# Patient Record
Sex: FEMALE | Race: WHITE | NOT HISPANIC OR LATINO | Employment: UNEMPLOYED | URBAN - METROPOLITAN AREA
[De-identification: names, ages, dates, MRNs, and addresses within clinical notes are randomized per-mention and may not be internally consistent; named-entity substitution may affect disease eponyms.]

---

## 2017-05-18 ENCOUNTER — TELEPHONE (OUTPATIENT)
Dept: SURGERY | Facility: CLINIC | Age: 52
End: 2017-05-18

## 2017-05-18 NOTE — TELEPHONE ENCOUNTER
Patient states that she has a mole on her back that she would like to be removed.  It is itchy and bothersome.  appointment booked in [place] with Dr Villagomez on Monday, June 12 at 145 PM

## 2017-05-18 NOTE — TELEPHONE ENCOUNTER
----- Message from Nancy Brown sent at 5/18/2017  2:07 PM CDT -----  Patient states that she need to ask you a question before scheduling a new patient appointment.  Call [phone].

## 2017-06-28 ENCOUNTER — HOSPITAL ENCOUNTER (OUTPATIENT)
Dept: RADIOLOGY | Facility: HOSPITAL | Age: 52
Discharge: HOME | End: 2017-06-28
Attending: INTERNAL MEDICINE
Payer: OTHER GOVERNMENT

## 2017-06-28 ENCOUNTER — INITIAL CONSULT (OUTPATIENT)
Dept: RHEUMATOLOGY | Facility: CLINIC | Age: 52
End: 2017-06-28
Payer: OTHER GOVERNMENT

## 2017-06-28 VITALS
BODY MASS INDEX: 25.73 KG/M2 | WEIGHT: 169.75 LBS | SYSTOLIC BLOOD PRESSURE: 124 MMHG | HEIGHT: 68 IN | HEART RATE: 60 BPM | DIASTOLIC BLOOD PRESSURE: 80 MMHG

## 2017-06-28 DIAGNOSIS — M25.50 POLYARTHRALGIA: ICD-10-CM

## 2017-06-28 DIAGNOSIS — M19.041 PRIMARY OSTEOARTHRITIS OF BOTH HANDS: ICD-10-CM

## 2017-06-28 DIAGNOSIS — M79.7 FIBROMYALGIA: ICD-10-CM

## 2017-06-28 DIAGNOSIS — M19.042 PRIMARY OSTEOARTHRITIS OF BOTH HANDS: ICD-10-CM

## 2017-06-28 DIAGNOSIS — M25.50 POLYARTHRALGIA: Primary | ICD-10-CM

## 2017-06-28 DIAGNOSIS — R53.83 MALAISE AND FATIGUE: ICD-10-CM

## 2017-06-28 DIAGNOSIS — R53.81 MALAISE AND FATIGUE: ICD-10-CM

## 2017-06-28 PROCEDURE — 73130 X-RAY EXAM OF HAND: CPT | Mod: 50,TC,PO

## 2017-06-28 PROCEDURE — 99213 OFFICE O/P EST LOW 20 MIN: CPT | Mod: PBBFAC,25,PO | Performed by: INTERNAL MEDICINE

## 2017-06-28 PROCEDURE — 73130 X-RAY EXAM OF HAND: CPT | Mod: 26,50,, | Performed by: RADIOLOGY

## 2017-06-28 PROCEDURE — 99205 OFFICE O/P NEW HI 60 MIN: CPT | Mod: S$PBB,,, | Performed by: INTERNAL MEDICINE

## 2017-06-28 PROCEDURE — 99999 PR PBB SHADOW E&M-EST. PATIENT-LVL III: CPT | Mod: PBBFAC,,, | Performed by: INTERNAL MEDICINE

## 2017-06-28 RX ORDER — GABAPENTIN 300 MG/1
300 CAPSULE ORAL NIGHTLY
Qty: 30 CAPSULE | Refills: 3 | Status: SHIPPED | OUTPATIENT
Start: 2017-06-28 | End: 2019-05-21

## 2017-06-28 RX ORDER — NAPROXEN SODIUM 220 MG
220 TABLET ORAL
COMMUNITY

## 2017-06-28 ASSESSMENT — ROUTINE ASSESSMENT OF PATIENT INDEX DATA (RAPID3)
PATIENT GLOBAL ASSESSMENT SCORE: 4
PAIN SCORE: 7.5
PSYCHOLOGICAL DISTRESS SCORE: 1.1
MDHAQ FUNCTION SCORE: 1
TOTAL RAPID3 SCORE: 4.94

## 2017-06-28 NOTE — PROGRESS NOTES
Subjective:          Chief Complaint: Sonido Loera is a 51 y.o. female who had concerns including Disease Management.    HPI:    Patient is a 52 y/o female with aches and pains for approximately 3 years in joints and long bones. INtermittent. 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 Present for 22 years. Did have cholecystectomy years ago but did not resolve the issue. Twice/year she has severe pain.   Exacerbated with loss of sleep over a period of time.     REVIEW OF SYSTEMS:    Review of Systems   Constitutional: Positive for malaise/fatigue. Negative for fever and weight loss.   HENT: Negative for sore throat.    Eyes: Negative for double vision, photophobia and redness.   Respiratory: Negative for cough, shortness of breath and wheezing.    Cardiovascular: Negative for chest pain, palpitations and orthopnea.   Gastrointestinal: Negative for abdominal pain, constipation and diarrhea.   Genitourinary: Negative for dysuria, hematuria and urgency.   Musculoskeletal: Positive for joint pain and myalgias. Negative for back pain.   Skin: Negative for rash.   Neurological: Negative for dizziness, tingling, focal weakness and headaches.   Endo/Heme/Allergies: Does not bruise/bleed easily.   Psychiatric/Behavioral: Negative for depression, hallucinations and suicidal ideas. The patient has insomnia.                Objective:            Past Medical History:   Diagnosis Date    Anemia     Cancer     Encounter for blood transfusion     Joint pain      Family History   Problem Relation Age of Onset    Rhinitis Daughter     Urticaria Neg Hx     Immunodeficiency Neg Hx     Eczema Neg Hx     Asthma Neg Hx     Angioedema Neg Hx      Social History   Substance Use Topics    Smoking status: Former Smoker     Quit date: 1/16/1983    Smokeless tobacco: Never Used    Alcohol use Not on file         Current Outpatient Prescriptions on File Prior to Visit   Medication  Sig Dispense Refill    ascorbic acid (VITAMIN C) 500 MG tablet Take 500 mg by mouth once daily.      cetirizine (ZYRTEC) 10 MG tablet Take 10 mg by mouth once daily.      loratadine (CLARITIN) 10 mg tablet Take 10 mg by mouth once daily.      multivitamin (ONE DAILY MULTIVITAMIN) per tablet Take 1 tablet by mouth once daily.      [DISCONTINUED] hydrocodone-acetaminophen 5-325mg (NORCO) 5-325 mg per tablet   0     No current facility-administered medications on file prior to visit.        Vitals:    06/28/17 0859   BP: 124/80   Pulse: 60       Physical Exam:    Physical Exam   Constitutional: She is oriented to person, place, and time. She appears well-developed and well-nourished.   HENT:   Head: Normocephalic and atraumatic.   Mouth/Throat: Oropharynx is clear and moist.   Eyes: EOM are normal. Pupils are equal, round, and reactive to light.   Neck: Normal range of motion.   Cardiovascular: Normal rate, regular rhythm and normal heart sounds.    Pulmonary/Chest: Effort normal and breath sounds normal.   Musculoskeletal:        Right shoulder: She exhibits normal range of motion, no tenderness and no swelling.        Left shoulder: She exhibits normal range of motion, no tenderness and no swelling.        Right elbow: She exhibits normal range of motion and no swelling. No tenderness found.        Left elbow: She exhibits normal range of motion and no swelling. No tenderness found.        Right wrist: She exhibits normal range of motion, no tenderness and no swelling.        Left wrist: She exhibits normal range of motion, no tenderness and no swelling.        Right knee: She exhibits normal range of motion and no swelling. No tenderness found.        Left knee: She exhibits normal range of motion and no swelling. No tenderness found.        Right hand: She exhibits normal range of motion, no tenderness and no swelling.        Left hand: She exhibits normal range of motion, no tenderness and no swelling.         Right foot: There is normal range of motion, no tenderness and no swelling.        Left foot: There is normal range of motion, no tenderness and no swelling.   18 tender points examined in nine pairs: Posterior occiput, bilateral trapezius, bilateral supraspinatus, bilateral gluteal muscles, bilateral low cervical neck, bilateral second rib, bilateral lateral epicondyles, bilateral greater trochanters, bilateral medial knees. 6 Of 18 points examined were positive for tenderness.     Neurological: She is alert and oriented to person, place, and time.   Skin: Skin is warm and dry.   Psychiatric: She has a normal mood and affect. Her behavior is normal.             Assessment:       Encounter Diagnoses   Name Primary?    Polyarthralgia Yes    Fibromyalgia     Malaise and fatigue     Primary osteoarthritis of both hands           Plan:        Polyarthralgia  -     Sedimentation rate, manual; Future; Expected date: 06/28/2017  -     C-reactive protein; Standing; Expected date: 06/28/2017  -     SULEIMAN; Future; Expected date: 06/28/2017  -     Rheumatoid factor; Future; Expected date: 06/28/2017  -     TSH; Future; Expected date: 06/28/2017  -     T4, FREE; Future; Expected date: 06/28/2017  -     X-Ray Hand 3 View Bilateral; Future; Expected date: 06/28/2017    Fibromyalgia    Malaise and fatigue  -     Sedimentation rate, manual; Future; Expected date: 06/28/2017  -     C-reactive protein; Standing; Expected date: 06/28/2017  -     SULEIMAN; Future; Expected date: 06/28/2017  -     Rheumatoid factor; Future; Expected date: 06/28/2017  -     TSH; Future; Expected date: 06/28/2017  -     T4, FREE; Future; Expected date: 06/28/2017    Primary osteoarthritis of both hands    Other orders  -     gabapentin (NEURONTIN) 300 MG capsule; Take 1 capsule (300 mg total) by mouth every evening.  Dispense: 30 capsule; Refill: 3      Very pleasant 50 y/o female with myalgia and polyarthralgia triggered with sleep deprivation. Does not  have the typic tender points of FMS but highly suspect myofascial pain   -r/o thyroid   -Trial with gabapentin 300mg QHS    OA hands-NSAIDs PRN>   Spent time discussing FMS with patient and multidisciplinary approach to therapy with pharmacologic, psychologic, lifestyle modification and in particular low impact short interval exercise such as walking, latonia chi, yoga and swimming. Discussed the importance of sleep. Discussed exercise modalities.     Return in about 3 months (around 9/28/2017).        60min consultation with greater than 50% spent in counseling, chart review and coordination of care. All questions answered.  Thank you for allowing me to participate in the care of this very pleasant patient.

## 2017-06-29 ENCOUNTER — TELEPHONE (OUTPATIENT)
Dept: RHEUMATOLOGY | Facility: CLINIC | Age: 52
End: 2017-06-29

## 2017-06-29 NOTE — TELEPHONE ENCOUNTER
----- Message from Herrera Perez sent at 6/29/2017  2:42 PM CDT -----  Contact: Sonido  Patient states her Rx gabapentin (NEURONTIN) 300 MG capsule 30 capsule was sent to her old pharmacy and her insurance does not cover.     [facility] on [address], [place]   call her on [phone] (home)   Thanks!

## 2017-07-03 ENCOUNTER — TELEPHONE (OUTPATIENT)
Dept: RHEUMATOLOGY | Facility: CLINIC | Age: 52
End: 2017-07-03

## 2017-07-03 NOTE — TELEPHONE ENCOUNTER
Returned patient's call. Patient states that she sees her thyroid labs are within normal range but notices that its on the lower side. Patient wants to ask Dr. Kuamr is she can prescribe medication to increase thyroid levels. Informed patient Dr. Kumar will prob need to refer to endocrinology. Patient wants advisement from Dr. Kumar.

## 2017-08-28 ENCOUNTER — OFFICE VISIT (OUTPATIENT)
Dept: URGENT CARE | Facility: CLINIC | Age: 52
End: 2017-08-28
Payer: OTHER GOVERNMENT

## 2017-08-28 VITALS
SYSTOLIC BLOOD PRESSURE: 129 MMHG | HEART RATE: 89 BPM | DIASTOLIC BLOOD PRESSURE: 76 MMHG | RESPIRATION RATE: 16 BRPM | TEMPERATURE: 100 F | OXYGEN SATURATION: 96 %

## 2017-08-28 DIAGNOSIS — J18.9 PNEUMONIA DUE TO INFECTIOUS ORGANISM, UNSPECIFIED LATERALITY, UNSPECIFIED PART OF LUNG: ICD-10-CM

## 2017-08-28 DIAGNOSIS — R05.9 COUGH: Primary | ICD-10-CM

## 2017-08-28 PROCEDURE — 96372 THER/PROPH/DIAG INJ SC/IM: CPT | Mod: S$GLB,,, | Performed by: EMERGENCY MEDICINE

## 2017-08-28 PROCEDURE — 3008F BODY MASS INDEX DOCD: CPT | Mod: S$GLB,,, | Performed by: EMERGENCY MEDICINE

## 2017-08-28 PROCEDURE — 99203 OFFICE O/P NEW LOW 30 MIN: CPT | Mod: 25,S$GLB,, | Performed by: EMERGENCY MEDICINE

## 2017-08-28 RX ORDER — CEFTRIAXONE 250 MG/1
1000 INJECTION, POWDER, FOR SOLUTION INTRAMUSCULAR; INTRAVENOUS
Status: COMPLETED | OUTPATIENT
Start: 2017-08-28 | End: 2017-08-28

## 2017-08-28 RX ORDER — DOXYCYCLINE 100 MG/1
100 CAPSULE ORAL 2 TIMES DAILY
Qty: 14 CAPSULE | Refills: 0 | Status: SHIPPED | OUTPATIENT
Start: 2017-08-28 | End: 2017-09-04

## 2017-08-28 RX ORDER — BENZONATATE 200 MG/1
200 CAPSULE ORAL 3 TIMES DAILY PRN
Qty: 21 CAPSULE | Refills: 0 | Status: SHIPPED | OUTPATIENT
Start: 2017-08-28 | End: 2017-09-04

## 2017-08-28 RX ADMIN — CEFTRIAXONE 1000 MG: 250 INJECTION, POWDER, FOR SOLUTION INTRAMUSCULAR; INTRAVENOUS at 01:08

## 2017-08-28 NOTE — PROGRESS NOTES
Subjective:       Patient ID: Sonido Loera is a 51 y.o. female.    Vitals:  oral temperature is 100.2 °F (37.9 °C). Her blood pressure is 129/76 and her pulse is 89. Her respiration is 16 and oxygen saturation is 96%.     Chief Complaint: Cough (PT C/O PRODICTIVE COUGH, CHEST CONGESTION, HEADACHES, FATIGUE, FEVER AT HOME 101.9, TAKING IBUPROFEN WITH MILD RELIEF)    Normal body temp 97 by her hx.      Cough   This is a new problem. The current episode started 1 to 4 weeks ago. The problem has been unchanged. The problem occurs constantly. The cough is productive of sputum. Associated symptoms include chills, a fever and headaches. Pertinent negatives include no chest pain, ear pain, eye redness, myalgias, sore throat, shortness of breath or wheezing. Treatments tried: IBUPROFEN. The treatment provided mild relief.     Review of Systems   Constitution: Positive for chills, fever and malaise/fatigue.   HENT: Positive for congestion and headaches. Negative for ear pain, hoarse voice and sore throat.    Eyes: Negative for discharge and redness.   Cardiovascular: Negative for chest pain, dyspnea on exertion and leg swelling.   Respiratory: Positive for cough and sputum production. Negative for shortness of breath and wheezing.    Musculoskeletal: Negative for myalgias.   Gastrointestinal: Negative for abdominal pain and nausea.       Objective:      Physical Exam   Constitutional: She is oriented to person, place, and time. She appears well-developed and well-nourished. She is cooperative.  Non-toxic appearance. She does not appear ill. No distress.   HENT:   Head: Normocephalic and atraumatic.   Right Ear: Hearing, tympanic membrane, external ear and ear canal normal.   Left Ear: Hearing, tympanic membrane, external ear and ear canal normal.   Nose: Nose normal. No mucosal edema, rhinorrhea or nasal deformity. No epistaxis. Right sinus exhibits no maxillary sinus tenderness and no frontal sinus tenderness. Left sinus  exhibits no maxillary sinus tenderness and no frontal sinus tenderness.   Mouth/Throat: Uvula is midline, oropharynx is clear and moist and mucous membranes are normal. No trismus in the jaw. Normal dentition. No uvula swelling. No posterior oropharyngeal erythema.   Eyes: Conjunctivae and lids are normal. No scleral icterus.   Sclera clear bilat   Neck: Trachea normal, full passive range of motion without pain and phonation normal. Neck supple.   Cardiovascular: Regular rhythm, normal heart sounds, intact distal pulses and normal pulses.  Tachycardia present.    Pulmonary/Chest: Effort normal. No respiratory distress. She has rales in the right lower field.   Fine rales RLL   Abdominal: Normal appearance. She exhibits no distension. There is no tenderness.   Musculoskeletal: Normal range of motion. She exhibits no edema or deformity.   Neurological: She is alert and oriented to person, place, and time. She exhibits normal muscle tone. Coordination normal.   Skin: Skin is warm, dry and intact. She is not diaphoretic. No pallor.   Psychiatric: She has a normal mood and affect. Her speech is normal and behavior is normal. Judgment and thought content normal. Cognition and memory are normal.   Nursing note and vitals reviewed.      Assessment:       1. Cough    2. Pneumonia due to infectious organism, unspecified laterality, unspecified part of lung        Plan:         Cough  -     X-Ray Chest PA And Lateral; Future; Expected date: 08/28/2017    Pneumonia due to infectious organism, unspecified laterality, unspecified part of lung  -     doxycycline (VIBRAMYCIN) 100 MG Cap; Take 1 capsule (100 mg total) by mouth 2 (two) times daily.  Dispense: 14 capsule; Refill: 0  -     benzonatate (TESSALON) 200 MG capsule; Take 1 capsule (200 mg total) by mouth 3 (three) times daily as needed for Cough.  Dispense: 21 capsule; Refill: 0    Other orders  -     cefTRIAXone injection 1,000 mg; Inject 1,000 mg into the muscle one  time.

## 2017-08-31 ENCOUNTER — TELEPHONE (OUTPATIENT)
Dept: URGENT CARE | Facility: CLINIC | Age: 52
End: 2017-08-31

## 2018-11-28 ENCOUNTER — OFFICE VISIT (OUTPATIENT)
Dept: RHEUMATOLOGY | Facility: CLINIC | Age: 53
End: 2018-11-28
Payer: OTHER GOVERNMENT

## 2018-11-28 VITALS
BODY MASS INDEX: 25.76 KG/M2 | WEIGHT: 170 LBS | HEART RATE: 68 BPM | SYSTOLIC BLOOD PRESSURE: 132 MMHG | HEIGHT: 68 IN | DIASTOLIC BLOOD PRESSURE: 78 MMHG

## 2018-11-28 DIAGNOSIS — M79.7 FIBROMYALGIA: Primary | ICD-10-CM

## 2018-11-28 PROCEDURE — 99214 OFFICE O/P EST MOD 30 MIN: CPT | Mod: S$GLB,,, | Performed by: INTERNAL MEDICINE

## 2018-11-28 PROCEDURE — 99999 PR PBB SHADOW E&M-EST. PATIENT-LVL III: CPT | Mod: PBBFAC,,, | Performed by: INTERNAL MEDICINE

## 2018-11-28 NOTE — PROGRESS NOTES
Subjective:          Chief Complaint: Sonido Loera is a 53 y.o. female who had concerns including Polyarthralgia (annual).    HPI:    Patient is a 52 y/o female with aches and pains for approximately 3 years in joints and long bones. INtermittent. 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 Present for 22 years. Did have cholecystectomy years ago but did not resolve the issue. Twice/year she has severe pain.   Exacerbated with loss of sleep over a period of time.      We trialed gabapentin low dose, but she developed dizziness/vertigo. She has hx of vertigo type migraine, with sleep deprivation and gabapentin seemed to trigger this.   Right 2nd DIP with swelling and pain.     REVIEW OF SYSTEMS:    Review of Systems   Constitutional: Positive for malaise/fatigue. Negative for fever and weight loss.   HENT: Negative for sore throat.    Eyes: Negative for double vision, photophobia and redness.   Respiratory: Negative for cough, shortness of breath and wheezing.    Cardiovascular: Negative for chest pain, palpitations and orthopnea.   Gastrointestinal: Negative for abdominal pain, constipation and diarrhea.   Genitourinary: Negative for dysuria, hematuria and urgency.   Musculoskeletal: Positive for joint pain and myalgias. Negative for back pain.   Skin: Negative for rash.   Neurological: Negative for dizziness, tingling, focal weakness and headaches.   Endo/Heme/Allergies: Does not bruise/bleed easily.   Psychiatric/Behavioral: Negative for depression, hallucinations and suicidal ideas. The patient has insomnia.                Objective:            Past Medical History:   Diagnosis Date    Anemia     Breast cancer 2009    left lumpectomy    Cancer     Encounter for blood transfusion     Joint pain      Family History   Problem Relation Age of Onset    Rhinitis Daughter     Breast cancer Mother 82    Breast cancer Maternal Aunt 78        mother's twin     Urticaria Neg Hx     Immunodeficiency Neg Hx     Eczema Neg Hx     Asthma Neg Hx     Angioedema Neg Hx      Social History     Tobacco Use    Smoking status: Former Smoker     Last attempt to quit: 1983     Years since quittin.8    Smokeless tobacco: Never Used   Substance Use Topics    Alcohol use: Not on file    Drug use: Not on file         Current Outpatient Medications on File Prior to Visit   Medication Sig Dispense Refill    ascorbic acid (VITAMIN C) 500 MG tablet Take 500 mg by mouth once daily.      cetirizine (ZYRTEC) 10 MG tablet Take 10 mg by mouth once daily.      loratadine (CLARITIN) 10 mg tablet Take 10 mg by mouth once daily.      multivitamin (ONE DAILY MULTIVITAMIN) per tablet Take 1 tablet by mouth once daily.      gabapentin (NEURONTIN) 300 MG capsule Take 1 capsule (300 mg total) by mouth every evening. 30 capsule 3    naproxen sodium (ANAPROX) 220 MG tablet Take 220 mg by mouth every 12 (twelve) hours.       No current facility-administered medications on file prior to visit.        Vitals:    18 1051   BP: 132/78   Pulse: 68       Physical Exam:    Physical Exam   Constitutional: She is oriented to person, place, and time. She appears well-developed and well-nourished.   HENT:   Head: Normocephalic and atraumatic.   Mouth/Throat: Oropharynx is clear and moist.   Eyes: EOM are normal. Pupils are equal, round, and reactive to light.   Neck: Normal range of motion.   Cardiovascular: Normal rate, regular rhythm and normal heart sounds.   Pulmonary/Chest: Effort normal and breath sounds normal.   Musculoskeletal:        Right shoulder: She exhibits normal range of motion, no tenderness and no swelling.        Left shoulder: She exhibits normal range of motion, no tenderness and no swelling.        Right elbow: She exhibits normal range of motion and no swelling. No tenderness found.        Left elbow: She exhibits normal range of motion and no swelling. No tenderness  found.        Right wrist: She exhibits normal range of motion, no tenderness and no swelling.        Left wrist: She exhibits normal range of motion, no tenderness and no swelling.        Right knee: She exhibits normal range of motion and no swelling. No tenderness found.        Left knee: She exhibits normal range of motion and no swelling. No tenderness found.        Right hand: She exhibits normal range of motion, no tenderness and no swelling.        Left hand: She exhibits normal range of motion, no tenderness and no swelling.        Right foot: There is normal range of motion, no tenderness and no swelling.        Left foot: There is normal range of motion, no tenderness and no swelling.   18 tender points examined in nine pairs: Posterior occiput, bilateral trapezius, bilateral supraspinatus, bilateral gluteal muscles, bilateral low cervical neck, bilateral second rib, bilateral lateral epicondyles, bilateral greater trochanters, bilateral medial knees. 6 Of 18 points examined were positive for tenderness.     Neurological: She is alert and oriented to person, place, and time.   Skin: Skin is warm and dry.   Psychiatric: She has a normal mood and affect. Her behavior is normal.             Assessment:       No diagnosis found.       Plan:        There are no diagnoses linked to this encounter.  Very pleasant 52 y/o female with myalgia and polyarthralgia triggered with sleep deprivation. Does not have the typic tender points of FMS but highly suspect myofascial pain       OA hands-NSAIDs PRN> reviewed natural progression of OA hands and changes of the DIP joints.    Spent time discussing FMS with patient and multidisciplinary approach to therapy with pharmacologic, psychologic, lifestyle modification and in particular low impact short interval exercise such as walking, latonia chi, yoga and swimming. Discussed the importance of sleep. Discussed exercise modalities.    -trial of low dose naltrexone 4.5mg daily  compounded.    She had benefit with MSK complaints on Topiramate but noted significant rapid weight gain     No Follow-up on file.        30min consultation with greater than 50% spent in counseling, chart review and coordination of care. All questions answered.  Thank you for allowing me to participate in the care of this very pleasant patient.

## 2019-04-18 LAB
HUMAN PAPILLOMAVIRUS (HPV): NORMAL
PAP RECOMMENDATION EXT: NORMAL
PAP SMEAR: NORMAL

## 2019-05-16 ENCOUNTER — TELEPHONE (OUTPATIENT)
Dept: RHEUMATOLOGY | Facility: CLINIC | Age: 54
End: 2019-05-16

## 2019-05-16 NOTE — TELEPHONE ENCOUNTER
----- Message from Brandie Sethi sent at 5/16/2019  2:16 PM CDT -----  Type: Needs Medical Advice    Who Called:  self  Symptoms (please be specific):  Pain in stomach / comes and goes  How long has patient had these symptoms:  Afraid it could be a liver   Pharmacy name and phone #:     Best Call Back Number: [phone]  Additional Information: asking for advice

## 2019-05-21 ENCOUNTER — OFFICE VISIT (OUTPATIENT)
Dept: RHEUMATOLOGY | Facility: CLINIC | Age: 54
End: 2019-05-21
Payer: OTHER GOVERNMENT

## 2019-05-21 VITALS
HEIGHT: 68 IN | SYSTOLIC BLOOD PRESSURE: 139 MMHG | WEIGHT: 171.06 LBS | BODY MASS INDEX: 25.92 KG/M2 | DIASTOLIC BLOOD PRESSURE: 84 MMHG | HEART RATE: 68 BPM

## 2019-05-21 DIAGNOSIS — M15.9 PRIMARY OSTEOARTHRITIS INVOLVING MULTIPLE JOINTS: Primary | ICD-10-CM

## 2019-05-21 DIAGNOSIS — M79.7 FIBROMYALGIA: ICD-10-CM

## 2019-05-21 PROCEDURE — 99214 OFFICE O/P EST MOD 30 MIN: CPT | Mod: S$PBB,,, | Performed by: INTERNAL MEDICINE

## 2019-05-21 PROCEDURE — 99999 PR PBB SHADOW E&M-EST. PATIENT-LVL III: CPT | Mod: PBBFAC,,, | Performed by: INTERNAL MEDICINE

## 2019-05-21 PROCEDURE — 99213 OFFICE O/P EST LOW 20 MIN: CPT | Mod: PBBFAC,PO | Performed by: INTERNAL MEDICINE

## 2019-05-21 PROCEDURE — 99999 PR PBB SHADOW E&M-EST. PATIENT-LVL III: ICD-10-PCS | Mod: PBBFAC,,, | Performed by: INTERNAL MEDICINE

## 2019-05-21 PROCEDURE — 99214 PR OFFICE/OUTPT VISIT, EST, LEVL IV, 30-39 MIN: ICD-10-PCS | Mod: S$PBB,,, | Performed by: INTERNAL MEDICINE

## 2019-05-21 NOTE — PROGRESS NOTES
Subjective:          Chief Complaint: Sonido Loera is a 53 y.o. female who had concerns including Fibromyalgia (6 months).    HPI:    Patient is a 52 y/o female with aches and pains for approximately 3 years in joints and long bones. INtermittent. 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 Present for 22 years. Did have cholecystectomy years ago but did not resolve the issue. Twice/year she has severe pain.   Exacerbated with loss of sleep over a period of time.      We trialed gabapentin low dose, but she developed dizziness/vertigo. She has hx of vertigo type migraine, with sleep deprivation and gabapentin seemed to trigger this.   Right 2nd DIP with swelling and pain.     REVIEW OF SYSTEMS:    Review of Systems   Constitutional: Positive for malaise/fatigue. Negative for fever and weight loss.   HENT: Negative for sore throat.    Eyes: Negative for double vision, photophobia and redness.   Respiratory: Negative for cough, shortness of breath and wheezing.    Cardiovascular: Negative for chest pain, palpitations and orthopnea.   Gastrointestinal: Negative for abdominal pain, constipation and diarrhea.   Genitourinary: Negative for dysuria, hematuria and urgency.   Musculoskeletal: Positive for joint pain and myalgias. Negative for back pain.   Skin: Negative for rash.   Neurological: Negative for dizziness, tingling, focal weakness and headaches.   Endo/Heme/Allergies: Does not bruise/bleed easily.   Psychiatric/Behavioral: Negative for depression, hallucinations and suicidal ideas. The patient has insomnia.                Objective:            Past Medical History:   Diagnosis Date    Anemia     Breast cancer 2009    left lumpectomy    Cancer     Encounter for blood transfusion     Joint pain      Family History   Problem Relation Age of Onset    Rhinitis Daughter     Breast cancer Mother 82    Breast cancer Maternal Aunt 78        mother's twin     Urticaria Neg Hx     Immunodeficiency Neg Hx     Eczema Neg Hx     Asthma Neg Hx     Angioedema Neg Hx      Social History     Tobacco Use    Smoking status: Former Smoker     Last attempt to quit: 1983     Years since quittin.3    Smokeless tobacco: Never Used   Substance Use Topics    Alcohol use: Not on file    Drug use: Not on file         Current Outpatient Medications on File Prior to Visit   Medication Sig Dispense Refill    ascorbic acid (VITAMIN C) 500 MG tablet Take 500 mg by mouth once daily.      cetirizine (ZYRTEC) 10 MG tablet Take 10 mg by mouth once daily.      loratadine (CLARITIN) 10 mg tablet Take 10 mg by mouth once daily.      multivitamin (ONE DAILY MULTIVITAMIN) per tablet Take 1 tablet by mouth once daily.      naltrexone HCl (NALTREXONE ORAL) Take by mouth. Compound      naproxen sodium (ANAPROX) 220 MG tablet Take 220 mg by mouth every 12 (twelve) hours.      loratadine (CLARITIN ORAL) Claritin   OTC      [DISCONTINUED] gabapentin (NEURONTIN) 300 MG capsule Take 1 capsule (300 mg total) by mouth every evening. 30 capsule 3     No current facility-administered medications on file prior to visit.        Vitals:    19 0953   BP: 139/84   Pulse: 68       Physical Exam:    Physical Exam   Constitutional: She is oriented to person, place, and time. She appears well-developed and well-nourished.   HENT:   Head: Normocephalic and atraumatic.   Mouth/Throat: Oropharynx is clear and moist.   Eyes: Pupils are equal, round, and reactive to light. EOM are normal.   Neck: Normal range of motion.   Cardiovascular: Normal rate, regular rhythm and normal heart sounds.   Pulmonary/Chest: Effort normal and breath sounds normal.   Musculoskeletal:        Right shoulder: She exhibits normal range of motion, no tenderness and no swelling.        Left shoulder: She exhibits normal range of motion, no tenderness and no swelling.        Right elbow: She exhibits normal range of  motion and no swelling. No tenderness found.        Left elbow: She exhibits normal range of motion and no swelling. No tenderness found.        Right wrist: She exhibits normal range of motion, no tenderness and no swelling.        Left wrist: She exhibits normal range of motion, no tenderness and no swelling.        Right knee: She exhibits normal range of motion and no swelling. No tenderness found.        Left knee: She exhibits normal range of motion and no swelling. No tenderness found.        Right hand: She exhibits normal range of motion, no tenderness and no swelling.        Left hand: She exhibits normal range of motion, no tenderness and no swelling.        Right foot: There is normal range of motion, no tenderness and no swelling.        Left foot: There is normal range of motion, no tenderness and no swelling.   18 tender points examined in nine pairs: Posterior occiput, bilateral trapezius, bilateral supraspinatus, bilateral gluteal muscles, bilateral low cervical neck, bilateral second rib, bilateral lateral epicondyles, bilateral greater trochanters, bilateral medial knees. 6 Of 18 points examined were positive for tenderness.     Neurological: She is alert and oriented to person, place, and time.   Skin: Skin is warm and dry.   Psychiatric: She has a normal mood and affect. Her behavior is normal.             Assessment:       Encounter Diagnoses   Name Primary?    Primary osteoarthritis involving multiple joints Yes    Fibromyalgia           Plan:        Primary osteoarthritis involving multiple joints    Fibromyalgia    Other orders  -     naltrexone capsule; Take 1 capsule (4.5 mg total) by mouth every evening.  Dispense: 30 capsule; Refill: 6      Very pleasant 54 y/o female with myalgia and polyarthralgia triggered with sleep deprivation. Does not have the typic tender points of FMS but highly suspect myofascial pain       OA hands-NSAIDs PRN> reviewed natural progression of OA hands and  changes of the DIP joints.    Spent time discussing FMS with patient and multidisciplinary approach to therapy with pharmacologic, psychologic, lifestyle modification and in particular low impact short interval exercise such as walking, lat[place] chi, yoga and swimming. Discussed the importance of sleep. Discussed exercise modalities.    -trial of low dose naltrexone 4.5mg daily compounded.    She had benefit with MSK complaints on Topiramate but noted significant rapid weight gain     No follow-ups on file.        30min consultation with greater than 50% spent in counseling, chart review and coordination of care. All questions answered.  Thank you for allowing me to participate in the care of this very pleasant patient.

## 2019-05-29 ENCOUNTER — LAB VISIT (OUTPATIENT)
Dept: LAB | Facility: HOSPITAL | Age: 54
End: 2019-05-29
Attending: NURSE PRACTITIONER
Payer: OTHER GOVERNMENT

## 2019-05-29 ENCOUNTER — OFFICE VISIT (OUTPATIENT)
Dept: GASTROENTEROLOGY | Facility: CLINIC | Age: 54
End: 2019-05-29
Payer: OTHER GOVERNMENT

## 2019-05-29 VITALS
DIASTOLIC BLOOD PRESSURE: 83 MMHG | HEART RATE: 82 BPM | RESPIRATION RATE: 18 BRPM | WEIGHT: 171.75 LBS | HEIGHT: 68 IN | BODY MASS INDEX: 26.03 KG/M2 | SYSTOLIC BLOOD PRESSURE: 127 MMHG

## 2019-05-29 DIAGNOSIS — R10.11 RIGHT UPPER QUADRANT ABDOMINAL PAIN: Primary | ICD-10-CM

## 2019-05-29 DIAGNOSIS — Z83.79 FAMILY HISTORY OF LIVER DISEASE: ICD-10-CM

## 2019-05-29 DIAGNOSIS — Z20.5 EXPOSURE TO HEPATITIS C: ICD-10-CM

## 2019-05-29 DIAGNOSIS — R10.11 RIGHT UPPER QUADRANT ABDOMINAL PAIN: ICD-10-CM

## 2019-05-29 DIAGNOSIS — Z90.49 S/P CHOLECYSTECTOMY: ICD-10-CM

## 2019-05-29 LAB
ALBUMIN SERPL BCP-MCNC: 3.9 G/DL (ref 3.5–5.2)
ALP SERPL-CCNC: 70 U/L (ref 55–135)
ALT SERPL W/O P-5'-P-CCNC: 15 U/L (ref 10–44)
AST SERPL-CCNC: 17 U/L (ref 10–40)
BILIRUB DIRECT SERPL-MCNC: 0.2 MG/DL (ref 0.1–0.3)
BILIRUB SERPL-MCNC: 0.3 MG/DL (ref 0.1–1)
ERYTHROCYTE [DISTWIDTH] IN BLOOD BY AUTOMATED COUNT: 13.6 % (ref 11.5–14.5)
HCT VFR BLD AUTO: 40.2 % (ref 37–48.5)
HGB BLD-MCNC: 12.9 G/DL (ref 12–16)
LIPASE SERPL-CCNC: 56 U/L (ref 4–60)
MCH RBC QN AUTO: 28.7 PG (ref 27–31)
MCHC RBC AUTO-ENTMCNC: 32.1 G/DL (ref 32–36)
MCV RBC AUTO: 90 FL (ref 82–98)
PLATELET # BLD AUTO: 278 K/UL (ref 150–350)
PMV BLD AUTO: 10.5 FL (ref 9.2–12.9)
PROT SERPL-MCNC: 7.5 G/DL (ref 6–8.4)
RBC # BLD AUTO: 4.49 M/UL (ref 4–5.4)
WBC # BLD AUTO: 5.64 K/UL (ref 3.9–12.7)

## 2019-05-29 PROCEDURE — 99214 OFFICE O/P EST MOD 30 MIN: CPT | Mod: PBBFAC,PO | Performed by: NURSE PRACTITIONER

## 2019-05-29 PROCEDURE — 99999 PR PBB SHADOW E&M-EST. PATIENT-LVL IV: ICD-10-PCS | Mod: PBBFAC,,, | Performed by: NURSE PRACTITIONER

## 2019-05-29 PROCEDURE — 85027 COMPLETE CBC AUTOMATED: CPT

## 2019-05-29 PROCEDURE — 80076 HEPATIC FUNCTION PANEL: CPT

## 2019-05-29 PROCEDURE — 99999 PR PBB SHADOW E&M-EST. PATIENT-LVL IV: CPT | Mod: PBBFAC,,, | Performed by: NURSE PRACTITIONER

## 2019-05-29 PROCEDURE — 80074 ACUTE HEPATITIS PANEL: CPT

## 2019-05-29 PROCEDURE — 36415 COLL VENOUS BLD VENIPUNCTURE: CPT | Mod: PO

## 2019-05-29 PROCEDURE — 83690 ASSAY OF LIPASE: CPT

## 2019-05-29 PROCEDURE — 99203 OFFICE O/P NEW LOW 30 MIN: CPT | Mod: S$PBB,,, | Performed by: NURSE PRACTITIONER

## 2019-05-29 PROCEDURE — 99203 PR OFFICE/OUTPT VISIT, NEW, LEVL III, 30-44 MIN: ICD-10-PCS | Mod: S$PBB,,, | Performed by: NURSE PRACTITIONER

## 2019-05-29 NOTE — PATIENT INSTRUCTIONS
Abdominal Pain    Abdominal pain is pain in the stomach or belly area. Everyone has this pain from time to time. In many cases it goes away on its own. But abdominal pain can sometimes be due to a serious problem, such as appendicitis. So its important to know when to seek help.  Causes of abdominal pain  There are many possible causes of abdominal pain. Common causes in adults include:  · Constipation, diarrhea, or gas  · Stomach acid flowing back up into the esophagus (acid reflux or heartburn)  · Severe acid reflux, called GERD (gastroesophageal reflux disease)  · A sore in the lining of the stomach or small intestine (peptic ulcer)  · Inflammation of the gallbladder, liver, or pancreas  · Gallstones or kidney stones  · Appendicitis   · Intestinal blockage   · An internal organ pushing through a muscle or other tissue (hernia)  · Urinary tract infections  · In women, menstrual cramps, fibroids, or endometriosis  · Inflammation or infection of the intestines  Diagnosing the cause of abdominal pain  Your healthcare provider will do a physical exam help find the cause of your pain. If needed, tests will be ordered. Belly pain has many possible causes. So it can be hard to find the reason for your pain. Giving details about your pain can help. Tell your provider where and when you feel the pain, and what makes it better or worse. Also let your provider know if you have other symptoms such as:  · Fever  · Tiredness  · Upset stomach (nausea)  · Vomiting  · Changes in bathroom habits  Treating abdominal pain  Some causes of pain need emergency medical treatment right away. These include appendicitis or a bowel blockage. Other problems can be treated with rest, fluids, or medicines. Your healthcare provider can give you specific instructions for treatment or self-care based on what is causing your pain.  If you have vomiting or diarrhea, sip water or other clear fluids. When you are ready to eat solid foods again,  start with small amounts of easy-to-digest, low-fat foods. These include apple sauce, toast, or crackers.   When to seek medical care  Call 911 or go to the hospital right away if you:  · Cant pass stool and are vomiting  · Are vomiting blood or have bloody diarrhea or black, tarry diarrhea  · Have chest, neck, or shoulder pain  · Feel like you might pass out  · Have pain in your shoulder blades with nausea  · Have sudden, severe belly pain  · Have new, severe pain unlike any you have felt before  · Have a belly that is rigid, hard, and tender to touch  Call your healthcare provider if you have:  · Pain for more than 5 days  · Bloating for more than 2 days  · Diarrhea for more than 5 days  · A fever of 100.4°F (38.0°C) or higher, or as directed by your provider  · Pain that gets worse  · Weight loss for no reason  · Continued lack of appetite  · Blood in your stool  How to prevent abdominal pain  Here are some tips to help prevent abdominal pain:  · Eat smaller amounts of food at one time.  · Avoid greasy, fried, or other high-fat foods.  · Avoid foods that give you gas.  · Exercise regularly.  · Drink plenty of fluids.  To help prevent GERD symptoms:  · Quit smoking.  · Reduce alcohol and certain foods that increase stomach acid.  · Avoid aspirin and over-the-counter pain and fever medicines (NSAIDS or nonsteroidal anti-inflammatory drugs), if possible  · Lose extra weight.  · Finish eating at least 2 hours before you go to bed or lie down.  · Raise the head of your bed.  Date Last Reviewed: 7/1/2016  © 1961-9369 [employer]. [address] [place] [address], [place] [address]. All rights reserved. This information is not intended as a substitute for professional medical care. Always follow your healthcare professional's instructions.
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
## 2019-05-30 ENCOUNTER — HOSPITAL ENCOUNTER (OUTPATIENT)
Dept: RADIOLOGY | Facility: HOSPITAL | Age: 54
Discharge: HOME | End: 2019-05-30
Attending: NURSE PRACTITIONER
Payer: OTHER GOVERNMENT

## 2019-05-30 DIAGNOSIS — R10.11 RIGHT UPPER QUADRANT ABDOMINAL PAIN: ICD-10-CM

## 2019-05-30 LAB
HAV IGM SERPL QL IA: NEGATIVE
HBV CORE IGM SERPL QL IA: NEGATIVE
HBV SURFACE AG SERPL QL IA: NEGATIVE
HCV AB SERPL QL IA: NEGATIVE

## 2019-05-30 PROCEDURE — 76700 US EXAM ABDOM COMPLETE: CPT | Mod: TC,PO

## 2019-05-30 PROCEDURE — 76700 US EXAM ABDOM COMPLETE: CPT | Mod: 26,,, | Performed by: RADIOLOGY

## 2019-05-30 PROCEDURE — 76700 US ABDOMEN COMPLETE: ICD-10-PCS | Mod: 26,,, | Performed by: RADIOLOGY

## 2019-05-31 ENCOUNTER — TELEPHONE (OUTPATIENT)
Dept: GASTROENTEROLOGY | Facility: CLINIC | Age: 54
End: 2019-05-31

## 2019-05-31 ENCOUNTER — PATIENT MESSAGE (OUTPATIENT)
Dept: GASTROENTEROLOGY | Facility: CLINIC | Age: 54
End: 2019-05-31

## 2019-05-31 DIAGNOSIS — Z20.5 EXPOSURE TO HEPATITIS C: ICD-10-CM

## 2019-05-31 DIAGNOSIS — Z83.79 FAMILY HISTORY OF LIVER DISEASE: ICD-10-CM

## 2019-05-31 DIAGNOSIS — K76.89 LIVER CYST: ICD-10-CM

## 2019-05-31 DIAGNOSIS — R10.11 RIGHT UPPER QUADRANT ABDOMINAL PAIN: ICD-10-CM

## 2019-05-31 DIAGNOSIS — R10.84 GENERALIZED ABDOMINAL PAIN: Primary | ICD-10-CM

## 2019-05-31 NOTE — TELEPHONE ENCOUNTER
"Please call to inform & review the results with the patient- radiology report of the abdominal ultrasound showed "20 mm right hepatic lobe cyst.  No acute intra-abdominal process appreciated sonographically." Cyst are typically benign and can be monitored by PCP.  Blood work was normal: normal liver function levels, negative hepatitis panel, normal pancreatic enzyme, & blood counts.    Continue with previous recommendations. If no improvement in symptoms or symptoms worsen, call/follow-up at clinic or go to ER.  Please release results to patient's mychart once you have discussed results and recommendations with patient.  Thanks,        "

## 2019-06-03 ENCOUNTER — DOCUMENTATION ONLY (OUTPATIENT)
Dept: TRANSPLANT | Facility: CLINIC | Age: 54
End: 2019-06-03

## 2019-06-03 NOTE — LETTER
Elsa 3, 2019    Sonido Loera  [address] [address]      Dear Sonido Loera:    Your doctor has referred you to the [facility]. We are sending this letter to remind you to make an appointment with us to complete the referral process.     Please call us at [phone] to schedule an appointment. We look forward to seeing you soon.     If you received a call and have been scheduled, please disregard this letter.       Sincerely,        [facility]   [address] [place] [id number]  [phone]

## 2019-06-03 NOTE — NURSING
Pt records reviewed.   Pt will be referred to Hepatology.  Right upper quadrant abdominal pain  Family history of liver disease  Liver cyst  Initial referral received  from the [facility].   Referring Provider/diagnosis  AFTAB Metzger      Referral letter sent to patient.

## 2019-06-04 ENCOUNTER — TELEPHONE (OUTPATIENT)
Dept: HEPATOLOGY | Facility: CLINIC | Age: 54
End: 2019-06-04

## 2019-06-04 NOTE — TELEPHONE ENCOUNTER
----- Message from Adriana Perales MA sent at 6/3/2019  4:41 PM CDT -----  Contact: pt      ----- Message -----  From: Luis Langston  Sent: 6/3/2019   3:42 PM  To: [facility] Staff    Type:  Sooner Apoointment Request    Caller is requesting a sooner appointment.  Caller declined first available appointment listed below.  Caller will not accept being placed on the waitlist and is requesting a message be sent to doctor.    Name of Caller:  pt  When is the first available appointment?  8.19.19  Symptoms:  Liver cyst  Best Call Back Number:  [phone]  Additional Information:  Pt is willing to be seen at any location

## 2019-06-06 ENCOUNTER — TELEPHONE (OUTPATIENT)
Dept: GASTROENTEROLOGY | Facility: CLINIC | Age: 54
End: 2019-06-06

## 2019-06-06 NOTE — TELEPHONE ENCOUNTER
Called pt to reschedule appt scheduled on 6/28. Left message for pt to contact clinic.   
ambulatory

## 2019-06-07 ENCOUNTER — LAB VISIT (OUTPATIENT)
Dept: LAB | Facility: HOSPITAL | Age: 54
End: 2019-06-07
Attending: FAMILY MEDICINE
Payer: OTHER GOVERNMENT

## 2019-06-07 DIAGNOSIS — R10.84 GENERALIZED ABDOMINAL PAIN: ICD-10-CM

## 2019-06-07 PROCEDURE — 87209 SMEAR COMPLEX STAIN: CPT

## 2019-06-10 LAB — O+P STL TRI STN: NORMAL

## 2019-06-11 ENCOUNTER — TELEPHONE (OUTPATIENT)
Dept: GASTROENTEROLOGY | Facility: CLINIC | Age: 54
End: 2019-06-11

## 2019-06-11 NOTE — TELEPHONE ENCOUNTER
----- Message from AFTAB Metzger sent at 6/11/2019 11:16 AM CDT -----  Please call to inform & review the results with the patient- Lab results are normal: negative for parasites or ova.  Continue with previous recommendations.  Thanks,  Teressa UGALDE-C

## 2019-06-20 NOTE — TELEPHONE ENCOUNTER
Spoke with pt and informed of results and recommendations per Hanna Subramanian NP. Pt verbalized understanding.

## 2019-07-01 ENCOUNTER — OFFICE VISIT (OUTPATIENT)
Dept: GASTROENTEROLOGY | Facility: CLINIC | Age: 54
End: 2019-07-01
Payer: OTHER GOVERNMENT

## 2019-07-01 VITALS — WEIGHT: 171.31 LBS | HEIGHT: 68 IN | BODY MASS INDEX: 25.96 KG/M2 | RESPIRATION RATE: 18 BRPM

## 2019-07-01 DIAGNOSIS — M54.9 MID BACK PAIN ON RIGHT SIDE: ICD-10-CM

## 2019-07-01 DIAGNOSIS — Z83.79 FAMILY HISTORY OF LIVER DISEASE: ICD-10-CM

## 2019-07-01 DIAGNOSIS — K76.89 LIVER CYST: ICD-10-CM

## 2019-07-01 DIAGNOSIS — Z90.49 S/P CHOLECYSTECTOMY: ICD-10-CM

## 2019-07-01 DIAGNOSIS — R10.11 RIGHT UPPER QUADRANT ABDOMINAL PAIN: Primary | ICD-10-CM

## 2019-07-01 PROCEDURE — 99214 OFFICE O/P EST MOD 30 MIN: CPT | Mod: PBBFAC,PO | Performed by: NURSE PRACTITIONER

## 2019-07-01 PROCEDURE — 99214 OFFICE O/P EST MOD 30 MIN: CPT | Mod: S$PBB,,, | Performed by: NURSE PRACTITIONER

## 2019-07-01 PROCEDURE — 99999 PR PBB SHADOW E&M-EST. PATIENT-LVL IV: CPT | Mod: PBBFAC,,, | Performed by: NURSE PRACTITIONER

## 2019-07-01 PROCEDURE — 99999 PR PBB SHADOW E&M-EST. PATIENT-LVL IV: ICD-10-PCS | Mod: PBBFAC,,, | Performed by: NURSE PRACTITIONER

## 2019-07-01 PROCEDURE — 99214 PR OFFICE/OUTPT VISIT, EST, LEVL IV, 30-39 MIN: ICD-10-PCS | Mod: S$PBB,,, | Performed by: NURSE PRACTITIONER

## 2019-07-01 NOTE — PATIENT INSTRUCTIONS
Abdominal Pain    Abdominal pain is pain in the stomach or belly area. Everyone has this pain from time to time. In many cases it goes away on its own. But abdominal pain can sometimes be due to a serious problem, such as appendicitis. So its important to know when to seek help.  Causes of abdominal pain  There are many possible causes of abdominal pain. Common causes in adults include:  · Constipation, diarrhea, or gas  · Stomach acid flowing back up into the esophagus (acid reflux or heartburn)  · Severe acid reflux, called GERD (gastroesophageal reflux disease)  · A sore in the lining of the stomach or small intestine (peptic ulcer)  · Inflammation of the gallbladder, liver, or pancreas  · Gallstones or kidney stones  · Appendicitis   · Intestinal blockage   · An internal organ pushing through a muscle or other tissue (hernia)  · Urinary tract infections  · In women, menstrual cramps, fibroids, or endometriosis  · Inflammation or infection of the intestines  Diagnosing the cause of abdominal pain  Your healthcare provider will do a physical exam help find the cause of your pain. If needed, tests will be ordered. Belly pain has many possible causes. So it can be hard to find the reason for your pain. Giving details about your pain can help. Tell your provider where and when you feel the pain, and what makes it better or worse. Also let your provider know if you have other symptoms such as:  · Fever  · Tiredness  · Upset stomach (nausea)  · Vomiting  · Changes in bathroom habits  Treating abdominal pain  Some causes of pain need emergency medical treatment right away. These include appendicitis or a bowel blockage. Other problems can be treated with rest, fluids, or medicines. Your healthcare provider can give you specific instructions for treatment or self-care based on what is causing your pain.  If you have vomiting or diarrhea, sip water or other clear fluids. When you are ready to eat solid foods again,  start with small amounts of easy-to-digest, low-fat foods. These include apple sauce, toast, or crackers.   When to seek medical care  Call 911 or go to the hospital right away if you:  · Cant pass stool and are vomiting  · Are vomiting blood or have bloody diarrhea or black, tarry diarrhea  · Have chest, neck, or shoulder pain  · Feel like you might pass out  · Have pain in your shoulder blades with nausea  · Have sudden, severe belly pain  · Have new, severe pain unlike any you have felt before  · Have a belly that is rigid, hard, and tender to touch  Call your healthcare provider if you have:  · Pain for more than 5 days  · Bloating for more than 2 days  · Diarrhea for more than 5 days  · A fever of 100.4°F (38.0°C) or higher, or as directed by your provider  · Pain that gets worse  · Weight loss for no reason  · Continued lack of appetite  · Blood in your stool  How to prevent abdominal pain  Here are some tips to help prevent abdominal pain:  · Eat smaller amounts of food at one time.  · Avoid greasy, fried, or other high-fat foods.  · Avoid foods that give you gas.  · Exercise regularly.  · Drink plenty of fluids.  To help prevent GERD symptoms:  · Quit smoking.  · Reduce alcohol and certain foods that increase stomach acid.  · Avoid aspirin and over-the-counter pain and fever medicines (NSAIDS or nonsteroidal anti-inflammatory drugs), if possible  · Lose extra weight.  · Finish eating at least 2 hours before you go to bed or lie down.  · Raise the head of your bed.  Date Last Reviewed: 7/1/2016  © 7244-9849 [employer]. [address] [place] [address], [place] [address]. All rights reserved. This information is not intended as a substitute for professional medical care. Always follow your healthcare professional's instructions.
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
## 2019-07-11 ENCOUNTER — TELEPHONE (OUTPATIENT)
Dept: HEPATOLOGY | Facility: CLINIC | Age: 54
End: 2019-07-11

## 2019-07-11 ENCOUNTER — HOSPITAL ENCOUNTER (OUTPATIENT)
Dept: RADIOLOGY | Facility: HOSPITAL | Age: 54
Discharge: HOME | End: 2019-07-11
Attending: NURSE PRACTITIONER
Payer: OTHER GOVERNMENT

## 2019-07-11 DIAGNOSIS — M54.9 MID BACK PAIN ON RIGHT SIDE: ICD-10-CM

## 2019-07-11 DIAGNOSIS — Z90.49 S/P CHOLECYSTECTOMY: ICD-10-CM

## 2019-07-11 DIAGNOSIS — Z83.79 FAMILY HISTORY OF LIVER DISEASE: ICD-10-CM

## 2019-07-11 DIAGNOSIS — K76.89 LIVER CYST: ICD-10-CM

## 2019-07-11 DIAGNOSIS — R10.11 RIGHT UPPER QUADRANT ABDOMINAL PAIN: ICD-10-CM

## 2019-07-11 PROCEDURE — 74177 CT ABD & PELVIS W/CONTRAST: CPT | Mod: TC,PO

## 2019-07-11 PROCEDURE — 74177 CT ABD & PELVIS W/CONTRAST: CPT | Mod: 26,,, | Performed by: RADIOLOGY

## 2019-07-11 PROCEDURE — 74177 CT ABDOMEN PELVIS WITH CONTRAST: ICD-10-PCS | Mod: 26,,, | Performed by: RADIOLOGY

## 2019-07-11 PROCEDURE — 25500020 PHARM REV CODE 255: Mod: PO | Performed by: NURSE PRACTITIONER

## 2019-07-11 RX ADMIN — IOHEXOL 75 ML: 350 INJECTION, SOLUTION INTRAVENOUS at 09:07

## 2019-07-11 RX ADMIN — IOHEXOL 30 ML: 350 INJECTION, SOLUTION INTRAVENOUS at 09:07

## 2019-07-12 ENCOUNTER — TELEPHONE (OUTPATIENT)
Dept: GASTROENTEROLOGY | Facility: CLINIC | Age: 54
End: 2019-07-12

## 2019-07-12 NOTE — TELEPHONE ENCOUNTER
"Please call to inform & review the results with the patient- radiology report of the ct abdomen pelvis showed "No acute intra-abdominal process" overall.  Other findings showed "19 mm right hepatic lobe cyst and 2 smaller hypodensities within the left and right hepatic lobe, most likely cysts but too small to characterize." The cyst was seen on prior ultrasound and patient was reffered to see hepatology (continue with this recommendation).    Two splenic lesions seen which may be cyst (cyst are typically benign & some degenerative changes of the bones: Recommend follow-up with Primary Care Provider for continued evaluation and management of these findings.    Moderate amount of stool in colon: Recommend high fiber diet (20-30 grams of fiber daily)/OTC fiber supplements daily as directed.    "Fat containing midline ventral abdominal wall hernia": if it becomes painful or if it does not reduce patient needs to see a general surgeon or go to the ER, otherwise, it can be monitored    Continue with previous recommendations. If no improvement in symptoms or symptoms worsen, call/follow-up at clinic or go to ER.  Please release results to patient's mychart once you have discussed results and recommendations with patient.  Thanks,        "

## 2019-07-15 ENCOUNTER — TELEPHONE (OUTPATIENT)
Dept: HEPATOLOGY | Facility: CLINIC | Age: 54
End: 2019-07-15

## 2019-07-15 NOTE — TELEPHONE ENCOUNTER
MA called patient reschedule her [place] appt on 9/19 she accepted the appt. Mailed appt reminder to patient.

## 2019-07-18 ENCOUNTER — OFFICE VISIT (OUTPATIENT)
Dept: FAMILY MEDICINE | Facility: CLINIC | Age: 54
End: 2019-07-18
Payer: OTHER GOVERNMENT

## 2019-07-18 VITALS
HEIGHT: 68 IN | SYSTOLIC BLOOD PRESSURE: 138 MMHG | TEMPERATURE: 98 F | HEART RATE: 76 BPM | DIASTOLIC BLOOD PRESSURE: 96 MMHG | BODY MASS INDEX: 26.6 KG/M2 | WEIGHT: 175.5 LBS | OXYGEN SATURATION: 99 %

## 2019-07-18 DIAGNOSIS — M79.7 FIBROMYALGIA: ICD-10-CM

## 2019-07-18 DIAGNOSIS — K76.89 LIVER CYST: ICD-10-CM

## 2019-07-18 DIAGNOSIS — R59.1 LYMPHADENOPATHY: ICD-10-CM

## 2019-07-18 DIAGNOSIS — R22.1 NECK MASS: ICD-10-CM

## 2019-07-18 DIAGNOSIS — J30.1 SEASONAL ALLERGIC RHINITIS DUE TO POLLEN: ICD-10-CM

## 2019-07-18 DIAGNOSIS — K43.9 HERNIA OF ABDOMINAL WALL: ICD-10-CM

## 2019-07-18 PROCEDURE — 99204 OFFICE O/P NEW MOD 45 MIN: CPT | Mod: S$PBB,,, | Performed by: INTERNAL MEDICINE

## 2019-07-18 PROCEDURE — 99213 OFFICE O/P EST LOW 20 MIN: CPT | Mod: PBBFAC,PO | Performed by: INTERNAL MEDICINE

## 2019-07-18 PROCEDURE — 99999 PR PBB SHADOW E&M-EST. PATIENT-LVL III: CPT | Mod: PBBFAC,,, | Performed by: INTERNAL MEDICINE

## 2019-07-18 PROCEDURE — 99999 PR PBB SHADOW E&M-EST. PATIENT-LVL III: ICD-10-PCS | Mod: PBBFAC,,, | Performed by: INTERNAL MEDICINE

## 2019-07-18 PROCEDURE — 99204 PR OFFICE/OUTPT VISIT, NEW, LEVL IV, 45-59 MIN: ICD-10-PCS | Mod: S$PBB,,, | Performed by: INTERNAL MEDICINE

## 2019-07-18 NOTE — PROGRESS NOTES
"Patient ID: Sonido Loera     Chief Complaint:   Chief Complaint   Patient presents with    Adenopathy        HPI: New Patient to me, but known to [facility].   Complains of LAD in neck x few years that she thinks are getting larger and more numerous. Recent CT of abdomen and pelvis for unspecified abdominal pain showed a 1 cm Right inguinal LAD, 2 cm Right hepatic lobe cyst, Right sided stool and midline ventral fat-containing hernia. She does have pain there- will refer to Surgery and get CT of neck. No "bilateral" symptoms of malignancy.      Review of Systems   Constitutional: Negative.    HENT: Positive for sinus pressure and sneezing.    Eyes: Negative.    Respiratory: Negative.    Cardiovascular: Negative.    Gastrointestinal: Positive for abdominal pain.   Endocrine: Negative.    Genitourinary: Negative.    Musculoskeletal: Negative.    Skin: Negative.    Allergic/Immunologic: Negative.    Neurological: Negative.    Hematological: Negative.    Psychiatric/Behavioral: Negative.           Objective:      Physical Exam   Physical Exam   Constitutional: She is oriented to person, place, and time. She appears well-developed and well-nourished.   HENT:   Head: Normocephalic and atraumatic.   Nose: Nose normal.   Mouth/Throat: Oropharynx is clear and moist.   Eyes: Pupils are equal, round, and reactive to light. Conjunctivae and EOM are normal.   Neck: Trachea normal and normal range of motion. Neck supple.       Pea sized, non tender, moveable mass just midline to carotid bulb    Cardiovascular: Normal rate, regular rhythm, normal heart sounds and intact distal pulses.   Pulmonary/Chest: Effort normal and breath sounds normal.   Abdominal: Soft. Bowel sounds are normal.   Musculoskeletal: Normal range of motion.   Neurological: She is alert and oriented to person, place, and time.   Skin: Skin is warm and dry. Capillary refill takes less than 2 seconds.   Psychiatric: She has a normal mood and affect. Her behavior " "is normal. Judgment and thought content normal.   Nursing note and vitals reviewed.         Vitals:   Vitals:    07/18/19 0943   BP: (!) 138/96   BP Location: Right arm   Patient Position: Sitting   BP Method: Small (Manual)   Pulse: 76   Temp: 98.2 °F (36.8 °C)   TempSrc: Oral   SpO2: 99%   Weight: 79.6 kg (175 lb 7.8 oz)   Height: 5' 8" (1.727 m)      Assessment:       Patient Active Problem List    Diagnosis Date Noted    Allergic rhinitis due to pollen     Fibromyalgia     Lymphadenopathy     Hernia of abdominal wall     Liver cyst           Plan:       Sonido was seen today for adenopathy.    Diagnoses and all orders for this visit:    Seasonal allergic rhinitis due to pollen  Monitor     Fibromyalgia  Controlled     Lymphadenopathy  -     Lactate dehydrogenase; Future  -     CBC auto differential; Future  Neck CT     Hernia of abdominal wall  Refer to Dr. Lopez    Liver cyst  -     Comprehensive metabolic panel; Future  Repeat Ultrasound in 6-12 months    Neck mass  -     CT Soft Tissue Neck With Contrast; Future              "

## 2019-07-19 ENCOUNTER — TELEPHONE (OUTPATIENT)
Dept: FAMILY MEDICINE | Facility: CLINIC | Age: 54
End: 2019-07-19

## 2019-07-19 ENCOUNTER — HOSPITAL ENCOUNTER (OUTPATIENT)
Dept: RADIOLOGY | Facility: HOSPITAL | Age: 54
Discharge: HOME | End: 2019-07-19
Attending: INTERNAL MEDICINE
Payer: OTHER GOVERNMENT

## 2019-07-19 DIAGNOSIS — R91.1 LUNG NODULE: ICD-10-CM

## 2019-07-19 DIAGNOSIS — R22.1 NECK MASS: ICD-10-CM

## 2019-07-19 PROCEDURE — 70491 CT SOFT TISSUE NECK WITH CONTRAST: ICD-10-PCS | Mod: 26,,, | Performed by: RADIOLOGY

## 2019-07-19 PROCEDURE — 70491 CT SOFT TISSUE NECK W/DYE: CPT | Mod: 26,,, | Performed by: RADIOLOGY

## 2019-07-19 PROCEDURE — 70491 CT SOFT TISSUE NECK W/DYE: CPT | Mod: TC,PO

## 2019-07-19 PROCEDURE — 25500020 PHARM REV CODE 255: Mod: PO | Performed by: INTERNAL MEDICINE

## 2019-07-19 RX ADMIN — IOHEXOL 75 ML: 350 INJECTION, SOLUTION INTRAVENOUS at 09:07

## 2019-07-22 ENCOUNTER — TELEPHONE (OUTPATIENT)
Dept: FAMILY MEDICINE | Facility: CLINIC | Age: 54
End: 2019-07-22

## 2019-07-22 ENCOUNTER — HOSPITAL ENCOUNTER (OUTPATIENT)
Dept: RADIOLOGY | Facility: HOSPITAL | Age: 54
Discharge: HOME | End: 2019-07-22
Attending: INTERNAL MEDICINE
Payer: OTHER GOVERNMENT

## 2019-07-22 DIAGNOSIS — R91.1 LUNG NODULE: ICD-10-CM

## 2019-07-22 PROCEDURE — 71260 CT THORAX DX C+: CPT | Mod: 26,,, | Performed by: RADIOLOGY

## 2019-07-22 PROCEDURE — 71260 CT CHEST WITH CONTRAST: ICD-10-PCS | Mod: 26,,, | Performed by: RADIOLOGY

## 2019-07-22 PROCEDURE — 25500020 PHARM REV CODE 255: Mod: PO | Performed by: INTERNAL MEDICINE

## 2019-07-22 PROCEDURE — 71260 CT THORAX DX C+: CPT | Mod: TC,PO

## 2019-07-22 RX ADMIN — IOHEXOL 75 ML: 350 INJECTION, SOLUTION INTRAVENOUS at 03:07

## 2019-07-22 NOTE — TELEPHONE ENCOUNTER
----- Message from Loly Vlaerio sent at 7/20/2019 10:29 AM CDT -----  Contact: Patient  Type:  Patient Returning Call    Who Called:  Patient  Who Left Message for Patient:  Nurse  Does the patient know what this is regarding?:  Test results  Best Call Back Number:    Additional Information:  Call to pod. No answer

## 2019-07-23 ENCOUNTER — PATIENT MESSAGE (OUTPATIENT)
Dept: GASTROENTEROLOGY | Facility: CLINIC | Age: 54
End: 2019-07-23

## 2019-07-23 ENCOUNTER — PATIENT MESSAGE (OUTPATIENT)
Dept: FAMILY MEDICINE | Facility: CLINIC | Age: 54
End: 2019-07-23

## 2019-07-25 ENCOUNTER — TELEPHONE (OUTPATIENT)
Dept: GASTROENTEROLOGY | Facility: CLINIC | Age: 54
End: 2019-07-25

## 2019-07-25 NOTE — TELEPHONE ENCOUNTER
----- Message from Nalini Limon sent at 7/25/2019  1:27 PM CDT -----  Contact: Sonido singleton  Type:  Patient Returning Call    Who Called:  Sonido  Who Left Message for Patient:  Monica  Does the patient know what this is regarding?:  Medical release  Best Call Back Number:  [phone]  Additional Information:  Pls call Sonido regarding release. Tried miguelito n/a    
Spoke with pt and informed of message per Hanna Surbamanian NP. Pt stated she would come to clinic tomorrow to sign release. Pt verbalized understanding.   
Immediate family member

## 2019-07-25 NOTE — TELEPHONE ENCOUNTER
I'd like to refer her to Dr. Quezada (Endo) to evaluate the thyroid nodule and possibly do a Biopsy. I'd like to repeat the Chest CT in 12 months to re-evaluate her Right upper lobe nodule. The cyst in the liver appears benign and simple. I'm not to concerned about it, but we could get get an Ultrasound in 6 months to re-evaluate it. If she's very concerned about these findings, we can refer her to the appropriate specialists.

## 2019-07-30 ENCOUNTER — TELEPHONE (OUTPATIENT)
Dept: GASTROENTEROLOGY | Facility: CLINIC | Age: 54
End: 2019-07-30

## 2019-07-30 ENCOUNTER — TREATMENT PLANNING (OUTPATIENT)
Dept: GASTROENTEROLOGY | Facility: CLINIC | Age: 54
End: 2019-07-30

## 2019-07-30 NOTE — TELEPHONE ENCOUNTER
----- Message from AFTAB Metzger sent at 7/30/2019  1:05 PM CDT -----  Please inform the patient that I received and reviewed prior colonoscopy report from Dr. Davenport. Patient had a colon polyp removed and her next surveillance colonoscopy due 3/2020. The report also indicated tortuous colon which typically means longer colon and has more folds/turns.  Thanks  EMILY

## 2019-07-30 NOTE — PROGRESS NOTES
Reviewed medical records received from Dr. Davenport, summarized below and in medical & surgical history (endoscopies, etc), copies made & given to nurse to be scanned into system:   3/24/15 colonoscopy biopsy report (endoscopy findings noted on biopsy report)  Recommendations:  - next surveillance colonoscopy due 3/2020 due to colon polyp removed on 2015 colonoscopy

## 2019-08-06 ENCOUNTER — PATIENT MESSAGE (OUTPATIENT)
Dept: FAMILY MEDICINE | Facility: CLINIC | Age: 54
End: 2019-08-06

## 2019-08-06 DIAGNOSIS — K43.9 HERNIA OF ABDOMINAL WALL: Primary | ICD-10-CM

## 2019-08-09 RX ORDER — VITAMIN B COMPLEX
1 CAPSULE ORAL DAILY
COMMUNITY

## 2019-08-13 ENCOUNTER — ANESTHESIA EVENT (OUTPATIENT)
Dept: ENDOSCOPY | Facility: HOSPITAL | Age: 54
End: 2019-08-13
Payer: OTHER GOVERNMENT

## 2019-08-13 ENCOUNTER — HOSPITAL ENCOUNTER (OUTPATIENT)
Facility: HOSPITAL | Age: 54
Discharge: HOME | End: 2019-08-13
Attending: INTERNAL MEDICINE | Admitting: INTERNAL MEDICINE
Payer: OTHER GOVERNMENT

## 2019-08-13 ENCOUNTER — ANESTHESIA (OUTPATIENT)
Dept: ENDOSCOPY | Facility: HOSPITAL | Age: 54
End: 2019-08-13
Payer: OTHER GOVERNMENT

## 2019-08-13 DIAGNOSIS — R10.11 RUQ ABDOMINAL PAIN: ICD-10-CM

## 2019-08-13 LAB — H PYLORI INDEX VALUE: NEGATIVE

## 2019-08-13 PROCEDURE — 88305 TISSUE EXAM BY PATHOLOGIST: CPT | Performed by: PATHOLOGY

## 2019-08-13 PROCEDURE — 87449 NOS EACH ORGANISM AG IA: CPT | Mod: PO | Performed by: INTERNAL MEDICINE

## 2019-08-13 PROCEDURE — 00731 ANES UPR GI NDSC PX NOS: CPT | Mod: PO | Performed by: INTERNAL MEDICINE

## 2019-08-13 PROCEDURE — 88305 TISSUE SPECIMEN TO PATHOLOGY - SURGERY: ICD-10-PCS | Mod: 26,,, | Performed by: PATHOLOGY

## 2019-08-13 PROCEDURE — 63600175 PHARM REV CODE 636 W HCPCS: Mod: PO | Performed by: NURSE ANESTHETIST, CERTIFIED REGISTERED

## 2019-08-13 PROCEDURE — 37000009 HC ANESTHESIA EA ADD 15 MINS: Mod: PO | Performed by: INTERNAL MEDICINE

## 2019-08-13 PROCEDURE — 43239 PR EGD, FLEX, W/BIOPSY, SGL/MULTI: ICD-10-PCS | Mod: ,,, | Performed by: INTERNAL MEDICINE

## 2019-08-13 PROCEDURE — 63600175 PHARM REV CODE 636 W HCPCS: Mod: PO | Performed by: INTERNAL MEDICINE

## 2019-08-13 PROCEDURE — D9220A PRA ANESTHESIA: Mod: CRNA,,, | Performed by: NURSE ANESTHETIST, CERTIFIED REGISTERED

## 2019-08-13 PROCEDURE — D9220A PRA ANESTHESIA: Mod: ANES,,, | Performed by: ANESTHESIOLOGY

## 2019-08-13 PROCEDURE — 37000008 HC ANESTHESIA 1ST 15 MINUTES: Mod: PO | Performed by: INTERNAL MEDICINE

## 2019-08-13 PROCEDURE — 43239 EGD BIOPSY SINGLE/MULTIPLE: CPT | Mod: ,,, | Performed by: INTERNAL MEDICINE

## 2019-08-13 PROCEDURE — 27201012 HC FORCEPS, HOT/COLD, DISP: Mod: PO | Performed by: INTERNAL MEDICINE

## 2019-08-13 PROCEDURE — D9220A PRA ANESTHESIA: ICD-10-PCS | Mod: ANES,,, | Performed by: ANESTHESIOLOGY

## 2019-08-13 PROCEDURE — 43239 EGD BIOPSY SINGLE/MULTIPLE: CPT | Mod: PO | Performed by: INTERNAL MEDICINE

## 2019-08-13 PROCEDURE — D9220A PRA ANESTHESIA: ICD-10-PCS | Mod: CRNA,,, | Performed by: NURSE ANESTHETIST, CERTIFIED REGISTERED

## 2019-08-13 RX ORDER — PANTOPRAZOLE SODIUM 40 MG/1
40 TABLET, DELAYED RELEASE ORAL
Qty: 90 TABLET | Refills: 3 | Status: SHIPPED | OUTPATIENT
Start: 2019-08-13 | End: 2019-08-29

## 2019-08-13 RX ORDER — PROPOFOL 10 MG/ML
VIAL (ML) INTRAVENOUS
Status: DISCONTINUED | OUTPATIENT
Start: 2019-08-13 | End: 2019-08-13

## 2019-08-13 RX ORDER — LIDOCAINE HCL/PF 100 MG/5ML
SYRINGE (ML) INTRAVENOUS
Status: DISCONTINUED | OUTPATIENT
Start: 2019-08-13 | End: 2019-08-13

## 2019-08-13 RX ORDER — SODIUM CHLORIDE, SODIUM LACTATE, POTASSIUM CHLORIDE, CALCIUM CHLORIDE 600; 310; 30; 20 MG/100ML; MG/100ML; MG/100ML; MG/100ML
INJECTION, SOLUTION INTRAVENOUS CONTINUOUS
Status: DISCONTINUED | OUTPATIENT
Start: 2019-08-13 | End: 2019-08-13 | Stop reason: HOSPADM

## 2019-08-13 RX ORDER — SUCRALFATE 1 G/1
1 TABLET ORAL
Qty: 120 TABLET | Refills: 11 | Status: SHIPPED | OUTPATIENT
Start: 2019-08-13 | End: 2019-08-29

## 2019-08-13 RX ORDER — SODIUM CHLORIDE 0.9 % (FLUSH) 0.9 %
10 SYRINGE (ML) INJECTION
Status: DISCONTINUED | OUTPATIENT
Start: 2019-08-13 | End: 2019-08-13 | Stop reason: HOSPADM

## 2019-08-13 RX ADMIN — PROPOFOL 30 MG: 10 INJECTION, EMULSION INTRAVENOUS at 08:08

## 2019-08-13 RX ADMIN — LIDOCAINE HYDROCHLORIDE 100 MG: 20 INJECTION, SOLUTION INTRAVENOUS at 08:08

## 2019-08-13 RX ADMIN — SODIUM CHLORIDE, SODIUM LACTATE, POTASSIUM CHLORIDE, AND CALCIUM CHLORIDE: .6; .31; .03; .02 INJECTION, SOLUTION INTRAVENOUS at 08:08

## 2019-08-13 NOTE — DISCHARGE INSTRUCTIONS
Recovery After Procedural Sedation (Adult)  You have been given medicine by vein to make you sleep during your surgery. This may have included both a pain medicine and sleeping medicine. Most of the effects have worn off. But you may still have some drowsiness for the next 6 to 8 hours.  Home care  Follow these guidelines when you get home:  · For the next 8 hours, you should be watched by a responsible adult. This person should make sure your condition is not getting worse.  · Don't drink any alcohol for the next 24 hours.  · Don't drive, operate dangerous machinery, or make important business or personal decisions during the next 24 hours.  Note: Your healthcare provider may tell you not to take any medicine by mouth for pain or sleep in the next 4 hours. These medicines may react with the medicines you were given in the hospital. This could cause a much stronger response than usual.  Follow-up care  Follow up with your healthcare provider if you are not alert and back to your usual level of activity within 12 hours.  When to seek medical advice  Call your healthcare provider right away if any of these occur:  · Drowsiness gets worse  · Weakness or dizziness gets worse  · Repeated vomiting  · You can't be awakened   Date Last Reviewed: 10/18/2016  © 7851-0088 [employer]. [address] [place] [address], [place] [address]. All rights reserved. This information is not intended as a substitute for professional medical care. Always follow your healthcare professional's instructions.      PROBIOTICS:  Now that your colon is so cleaned out, now is a good time for a round of PROBIOTICS.  Take a  Probiotic product such as Align or Culturelle or Sepideh-Q, every day for a month.                  (The products listed are non-prescription, but you may need to ask the pharmacist for their location.)  Repeat this at least 5-6 times a year.      Tips to Control Acid Reflux    To control acid reflux, youll need to make  some basic diet and lifestyle changes. The simple steps outlined below may be all youll need to ease discomfort.  Watch what you eat  · Avoid fatty foods and spicy foods.  · Eat fewer acidic foods, such as citrus and tomato-based foods. These can increase symptoms.  · Limit drinking alcohol, caffeine, and fizzy beverages. All increase acid reflux.  · Try limiting chocolate, peppermint, and spearmint. These can worsen acid reflux in some people.  Watch when you eat  · Avoid lying down for 3 hours after eating.  · Do not snack before going to bed.  Raise your head  Raising your head and upper body by 4 to 6 inches helps limit reflux when youre lying down. Put blocks under the head of your bed frame to raise it.  Other changes  · Lose weight, if you need to  · Dont exercise near bedtime  · Avoid tight-fitting clothes  · Limit aspirin and ibuprofen  · Stop smoking   Date Last Reviewed: 7/1/2016 © 2000-2017 [employer]. [address] [place] [address], [place] [address]. All rights reserved. This information is not intended as a substitute for professional medical care. Always follow your healthcare professional's instructions.        High-Fiber Diet  Fiber is in fruits, vegetables, cereals, and grains. Fiber passes through your body undigested. A high-fiber diet helps food move through your intestinal tract. The added bulk is helpful in preventing constipation. In people with diverticulosis, fiber helps clean out the pouches along the colon wall. It also prevents new pouches from forming. A high-fiber diet reduces the risk of colon cancer. It also lowers blood cholesterol and prevents high blood sugar in people with diabetes.    The fiber-rich foods listed below should be part of your diet. If you are not used to high-fiber foods, start with 1 or 2 foods from this list. Every 3 to 4 days add a new one to your diet. Do this until you are eating 4 high-fiber foods per day. This should give you 20 to 35 grams of  fiber a day. It is also important to drink a lot of water when you are on this diet. You should have 6 to 8 glasses of water a day. Water makes the fiber swell and increases the benefit.  Foods high in dietary fiber  The following foods are high in dietary fiber:  · Breads. Breads made with 100% whole-wheat flour; artie, wheat, or rye crackers; whole-grain tortillas, bran muffins.  · Cereals. Whole-grain and bran cereals with bran (shredded wheat, wheat flakes, raisin bran, corn bran); oatmeal, rolled oats, granola, and brown rice.  · Fruits. Fresh fruits and their edible skins (pears, prunes, raisins, berries, apples, and apricots); bananas, citrus fruit, mangoes, pineapple; and prune juice.  · Nuts. Any nuts and seeds.  · Vegetables. Best served raw or lightly cooked. All types, especially: green peas, celery, eggplant, potatoes, spinach, broccoli, McConnell sprouts, winter squash, carrots, cauliflower, soybeans, lentils, and fresh and dried beans of all kinds.  · Other. Popcorn, any spices.  Date Last Reviewed: 8/1/2016  © 5681-8070 [employer]. [address] [place] [address], [place] [address]. All rights reserved. This information is not intended as a substitute for professional medical care. Always follow your healthcare professional's instructions.
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
## 2019-08-13 NOTE — ANESTHESIA POSTPROCEDURE EVALUATION
Anesthesia Post Evaluation    Patient: Sonido Loera    Procedure(s) Performed: Procedure(s) (LRB):  EGD (ESOPHAGOGASTRODUODENOSCOPY) (N/A)    Final Anesthesia Type: general  Patient location during evaluation: PACU  Patient participation: Yes- Able to Participate  Level of consciousness: awake and alert and oriented  Post-procedure vital signs: reviewed and stable  Pain management: adequate  Airway patency: patent  PONV status at discharge: No PONV  Anesthetic complications: no      Cardiovascular status: blood pressure returned to baseline and stable  Respiratory status: unassisted and spontaneous ventilation  Hydration status: euvolemic  Follow-up not needed.          Vitals Value Taken Time   /61 8/13/2019  9:08 AM   Temp 36.2 °C (97.2 °F) 8/13/2019  8:52 AM   Pulse 88 8/13/2019  9:08 AM   Resp 20 8/13/2019  9:08 AM   SpO2 98 % 8/13/2019  9:08 AM         No case tracking events are documented in the log.      Pain/Waqas Score: No data recorded

## 2019-08-13 NOTE — [employer] PATIENT INSTRUCTIONS
Discharge Summary/Instructions after an Endoscopic Procedure  Patient Name: Sonido Loera  Patient MRN: [id number]  Patient YOB: 1965  Tuesday, August 13, 2019  Michael Hebert MD  RESTRICTIONS:  During your procedure today, you received medications for sedation.  These   medications may affect your judgment, balance and coordination.  Therefore,   for 24 hours, you have the following restrictions:   - DO NOT drive a car, operate machinery, make legal/financial decisions,   sign important papers or drink alcohol.    ACTIVITY:  Today: no heavy lifting, straining or running due to procedural   sedation/anesthesia.  The following day: return to full activity including work.  DIET:  Eat and drink normally unless instructed otherwise.     TREATMENT FOR COMMON SIDE EFFECTS:  - Mild abdominal pain, nausea, belching, bloating or excessive gas:  rest,   eat lightly and use a heating pad.  - Sore Throat: treat with throat lozenges and/or gargle with warm salt   water.  - Because air was used during the procedure, expelling large amounts of air   from your rectum or belching is normal.  - If a bowel prep was taken, you may not have a bowel movement for 1-3 days.    This is normal.  SYMPTOMS TO WATCH FOR AND REPORT TO YOUR PHYSICIAN:  1. Abdominal pain or bloating, other than gas cramps.  2. Chest pain.  3. Back pain.  4. Signs of infection such as: chills or fever occurring within 24 hours   after the procedure.  5. Rectal bleeding, which would show as bright red, maroon, or black stools.   (A tablespoon of blood from the rectum is not serious, especially if   hemorrhoids are present.)  6. Vomiting.  7. Weakness or dizziness.  GO DIRECTLY TO THE NEAREST EMERGENCY ROOM IF YOU HAVE ANY OF THE FOLLOWING:      Difficulty breathing              Chills and/or fever over 101 F   Persistent vomiting and/or vomiting blood   Severe abdominal pain   Severe chest pain   Black, tarry stools   Bleeding- more than one  tablespoon   Any other symptom or condition that you feel may need urgent attention  Your doctor recommends these additional instructions:  If any biopsies were taken, your doctors clinic will contact you in 1 to 2   weeks with any results.  Take Protonix (pantoprazole) 40 mg by mouth once a day.   Take Carafate (sucralfate) tablets 1 gram by mouth four times a day, before   meals and at bedtime.   Return to GI clinic in 4-6 weeks.  For questions, problems or results please call your physician - Michael Hebert MD at Work:  [phone].  EMERGENCY PHONE NUMBER: [phone], LAB RESULTS: [phone]  IF A COMPLICATION OR EMERGENCY SITUATION ARISES AND YOU ARE UNABLE TO REACH   YOUR PHYSICIAN - GO DIRECTLY TO THE EMERGENCY ROOM.  ___________________________________________  Nurse Signature  ___________________________________________  Patient/Designated Responsible Party Signature  Michael Hebert MD  8/13/2019 9:08:15 AM  This report has been verified and signed electronically.  [employer]

## 2019-08-13 NOTE — BRIEF OP NOTE
Discharge Note  Short Stay      SUMMARY     Admit Date: 8/13/2019    Attending Physician: Michael Hebert Jr., MD     Discharge Physician: Michael Hebert Jr., MD    Discharge Date: 8/13/2019 9:11 AM    Final Diagnosis: RUQ pain [R10.11]  S/P cholecystectomy [Z90.49]    Impression:          - Normal oropharynx.                       - Normal esophagus.                       - Z-line regular, 38 cm from the incisors.                       - Patulous lower esophageal sphincter.                       - Bilious gastric fluid.                       - A few mucosal papules (nodules) found in the                        stomach. Biopsied, r/o intestinal metaplasia.                       - Gastritis. Biopsied.                       - Slight antritis. Biopsied.                       - Normal stomach otherwise.                       - Normal pylorus.                       - Normal examined duodenum. Biopsied.  Recommendation:      - Discharge patient to home.                       - Await pathology and CLOtest results.                       - Use Protonix (pantoprazole) 40 mg PO daily.                       - Use sucralfate tablets 1 gram PO QID.                       - Call the G.I. clinic in 2 weeks for reports (if                        you haven't heard from us sooner) [phone].                       - Return to GI clinic in 4-6 weeks.  Michael Hebert MD  8/13/2019   Disposition: HOME OR SELF CARE    Patient Instructions:   Current Discharge Medication List      START taking these medications    Details   pantoprazole (PROTONIX) 40 MG tablet Take 1 tablet (40 mg total) by mouth before breakfast.  Qty: 90 tablet, Refills: 3      sucralfate (CARAFATE) 1 gram tablet Take 1 tablet (1 g total) by mouth 4 (four) times daily before meals and nightly.  Qty: 120 tablet, Refills: 11         CONTINUE these medications which have NOT CHANGED    Details   ascorbic acid (VITAMIN C) 500 MG tablet Take 500 mg by mouth once daily.       b complex vitamins capsule Take 1 capsule by mouth once daily.      cetirizine (ZYRTEC) 10 MG tablet Take 10 mg by mouth once daily.      loratadine (CLARITIN) 10 mg tablet Take 10 mg by mouth once daily.      multivitamin (ONE DAILY MULTIVITAMIN) per tablet Take 1 tablet by mouth once daily.      naltrexone capsule Take 1 capsule (4.5 mg total) by mouth every evening.  Qty: 30 capsule, Refills: 6      naproxen sodium (ANAPROX) 220 MG tablet Take 220 mg by mouth every 12 (twelve) hours.      NON FORMULARY MEDICATION              Discharge Procedure Orders (must include Diet, Follow-up, Activity)    Follow Up:  Follow up with PCP as per your routine.  Please follow an anti reflux diet and a high fiber diet.  Activity as tolerated.    No driving day of procedure.    PROBIOTICS:  Now that your colon is so cleaned out, now is a good time for a round of PROBIOTICS.  Take a  Probiotic product such as Align or Culturelle or Sepideh-Q, every day for a month.                  (The products listed are non-prescription, but you may need to ask the pharmacist for their location.)  Repeat this at least 5-6 times a year.

## 2019-08-13 NOTE — ANESTHESIA PREPROCEDURE EVALUATION
08/13/2019  Sonido Loera is a 53 y.o., female.    Anesthesia Evaluation    I have reviewed the Patient Summary Reports.    I have reviewed the Nursing Notes.   I have reviewed the Medications.     Review of Systems  Anesthesia Hx:  No problems with previous Anesthesia    Social:  Former Smoker    Hematology/Oncology:         -- Anemia: --  Cancer in past history:  Breast   Cardiovascular:  Cardiovascular Normal     Pulmonary:  Pulmonary Normal    Renal/:  Renal/ Normal     Hepatic/GI:   Liver Disease,    Neurological:   Neuromuscular Disease,    Endocrine:  Endocrine Normal        Physical Exam  General:  Well nourished    Airway/Jaw/Neck:  Airway Findings: Mouth Opening: Normal Tongue: Normal  General Airway Assessment: Adult  Oropharynx Findings:  Mallampati: II  Jaw/Neck Findings:  Neck ROM: Normal ROM     Eyes/Ears/Nose:  Eyes/Ears/Nose Findings:    Dental:  Dental Findings:   Chest/Lungs:  Chest/Lungs Findings: Normal Respiratory Rate     Heart/Vascular:  Heart Findings: Rate: Normal  Rhythm: Regular Rhythm        Mental Status:  Mental Status Findings:  Cooperative, Alert and Oriented         Anesthesia Plan  Type of Anesthesia, risks & benefits discussed:  Anesthesia Type:  general  Patient's Preference:   Intra-op Monitoring Plan: standard ASA monitors  Intra-op Monitoring Plan Comments:   Post Op Pain Control Plan: multimodal analgesia  Post Op Pain Control Plan Comments:   Induction:   IV  Beta Blocker:  Patient is not currently on a Beta-Blocker (No further documentation required).       Informed Consent: Patient understands risks and agrees with Anesthesia plan.  Questions answered. Anesthesia consent signed with patient.  ASA Score: 2     Day of Surgery Review of History & Physical:  There are no significant changes.   H&P completed by Anesthesiologist.       Ready For Surgery From  Anesthesia Perspective.

## 2019-08-13 NOTE — TRANSFER OF CARE
"Anesthesia Transfer of Care Note    Patient: Sonido Loera    Procedure(s) Performed: Procedure(s) (LRB):  EGD (ESOPHAGOGASTRODUODENOSCOPY) (N/A)    Patient location: PACU    Anesthesia Type: general    Transport from OR: Transported from OR on room air with adequate spontaneous ventilation    Post pain: adequate analgesia    Post assessment: no apparent anesthetic complications and tolerated procedure well    Post vital signs: stable    Level of consciousness: awake and alert    Nausea/Vomiting: no nausea/vomiting    Complications: none    Transfer of care protocol was followed      Last vitals:   Visit Vitals  /74 (BP Location: Right arm, Patient Position: Lying)   Pulse 75   Temp 36.5 °C (97.7 °F) (Skin)   Resp 15   Ht 5' 8" (1.727 m)   Wt 79.4 kg (175 lb)   LMP 07/16/2011   SpO2 96%   Breastfeeding? No   BMI 26.61 kg/m²     "

## 2019-08-14 ENCOUNTER — PATIENT MESSAGE (OUTPATIENT)
Dept: GASTROENTEROLOGY | Facility: CLINIC | Age: 54
End: 2019-08-14

## 2019-08-14 VITALS
DIASTOLIC BLOOD PRESSURE: 61 MMHG | BODY MASS INDEX: 26.52 KG/M2 | HEART RATE: 88 BPM | RESPIRATION RATE: 20 BRPM | TEMPERATURE: 97 F | WEIGHT: 175 LBS | HEIGHT: 68 IN | SYSTOLIC BLOOD PRESSURE: 118 MMHG | OXYGEN SATURATION: 98 %

## 2019-08-16 ENCOUNTER — PATIENT OUTREACH (OUTPATIENT)
Dept: ADMINISTRATIVE | Facility: HOSPITAL | Age: 54
End: 2019-08-16

## 2019-08-16 NOTE — LETTER
AUTHORIZATION FOR RELEASE OF   CONFIDENTIAL INFORMATION    Dear Dr. Davenport,    We are seeing Sonido Loera, date of birth 1965, in the clinic at [facility]. Norbert Mane MD is the patient's PCP. Sonido Loera has an outstanding lab/procedure at the time we reviewed her chart. In order to help keep her health information updated, she has authorized us to request the following medical record(s):        COLONOSCOPY            Please fax records to [facility], John C Oubre, MD, [phone]     If you have any questions, please contact Sharla Camejo, Care Coordinator   at [phone].            Patient Name: Sonido Loera  : 1965  Patient Phone #: [phone]

## 2019-08-20 ENCOUNTER — TELEPHONE (OUTPATIENT)
Dept: GASTROENTEROLOGY | Facility: CLINIC | Age: 54
End: 2019-08-20

## 2019-08-20 ENCOUNTER — OFFICE VISIT (OUTPATIENT)
Dept: OTOLARYNGOLOGY | Facility: CLINIC | Age: 54
End: 2019-08-20
Payer: OTHER GOVERNMENT

## 2019-08-20 VITALS — WEIGHT: 174.63 LBS | BODY MASS INDEX: 26.47 KG/M2 | HEIGHT: 68 IN

## 2019-08-20 DIAGNOSIS — K11.1 ENLARGED SALIVARY GLAND: Primary | ICD-10-CM

## 2019-08-20 DIAGNOSIS — Q38.4: ICD-10-CM

## 2019-08-20 PROCEDURE — 99212 OFFICE O/P EST SF 10 MIN: CPT | Mod: PBBFAC,PO | Performed by: OTOLARYNGOLOGY

## 2019-08-20 PROCEDURE — 99999 PR PBB SHADOW E&M-EST. PATIENT-LVL II: CPT | Mod: PBBFAC,,, | Performed by: OTOLARYNGOLOGY

## 2019-08-20 PROCEDURE — 99203 PR OFFICE/OUTPT VISIT, NEW, LEVL III, 30-44 MIN: ICD-10-PCS | Mod: S$PBB,,, | Performed by: OTOLARYNGOLOGY

## 2019-08-20 PROCEDURE — 99203 OFFICE O/P NEW LOW 30 MIN: CPT | Mod: S$PBB,,, | Performed by: OTOLARYNGOLOGY

## 2019-08-20 PROCEDURE — 99999 PR PBB SHADOW E&M-EST. PATIENT-LVL II: ICD-10-PCS | Mod: PBBFAC,,, | Performed by: OTOLARYNGOLOGY

## 2019-08-20 NOTE — PROGRESS NOTES
"Subjective:       Patient ID: Sonido Loera is a 53 y.o. female.    Chief Complaint: swollen gland in neck and lesions in mouth    Sonido is here for enlargement of submandibular gland.   Length of symptoms: years has been fluctuating over this time. She denies pain but occasionally feels tightness / "weird" feeling. Unsure how often it occurs. No pain with eating, no swelling with eating. No previous neck surgery.   Onset: Gradual  Symptoms occur Every day  Therapies tried include: None  Associated symptoms: no otalgia. No dysphagia, occ loss of voice but no chronic changes.   She has noticed some changes on the back wall of her throat associ with a "weird" feeling. Unable to describe. Occurs intermittently.      Pertinent meds: None  Pertinent medical issues: heartburn is controlled.     Social History     Tobacco Use   Smoking Status Former Smoker    Packs/day: 1.00    Years: 3.00    Pack years: 3.00    Types: Cigarettes    Last attempt to quit: 1983    Years since quittin.6   Smokeless Tobacco Never Used     Social History     Substance and Sexual Activity   Alcohol Use Yes    Alcohol/week: 0.6 oz    Types: 1 Glasses of wine per week    Frequency: Monthly or less    Drinks per session: 1 or 2    Binge frequency: Never    Comment: 1-2 times a year      Review of Systems   Constitutional: Negative for activity change and appetite change.   Eyes: Negative for discharge.   Respiratory: Negative for difficulty breathing and wheezing   Cardiovascular: Negative for chest pain.   Gastrointestinal: Negative for abdominal distention and abdominal pain.   Endocrine: Negative for cold intolerance and heat intolerance.   Genitourinary: Negative for dysuria.   Musculoskeletal: Negative for gait problem and joint swelling.   Skin: Negative for color change and pallor.   Neurological: Negative for syncope and weakness.   Psychiatric/Behavioral: Negative for agitation and confusion.     Objective:    "     Constitutional:   She is oriented to person, place, and time. She appears well-developed and well-nourished. She appears alert. She is active. Normal speech.      Head:  Normocephalic and atraumatic. Head is without TMJ tenderness. No scars. Salivary glands normal.  Facial strength is normal.    Mildly ptotic submandibular gland on the right. No significant enlargement.  No pain with palpation.     Ears:    Right Ear: No drainage or swelling. No middle ear effusion.   Left Ear: No drainage or swelling.  No middle ear effusion.     Nose:  No mucosal edema, rhinorrhea or sinus tenderness. No turbinate hypertrophy.      Mouth/Throat  Oropharynx clear and moist without lesions or asymmetry, normal uvula midline and mirror exam normal. Normal dentition. No uvula swelling, lacerations or trismus. No oropharyngeal exudate. Tonsillar erythema, tonsillar exudate.    Cords mobile, no lesions.      Neck:  Full range of motion with neck supple and no adenopathy. Thyroid tenderness is present. No tracheal deviation, no edema, no erythema, normal range of motion, no stridor, no crepitus and no neck rigidity present. No thyroid mass present.     Cardiovascular:   Intact distal pulses and normal pulses.      Pulmonary/Chest:   Effort normal and breath sounds normal. No stridor.     Psychiatric:   Her speech is normal and behavior is normal. Her mood appears not anxious. Her affect is not labile.     Neurological:   She is alert and oriented to person, place, and time. No sensory deficit.     Skin:   No abrasions, lacerations, lesions, or rashes. No abrasion and no bruising noted.         Tests / Results:  I personally reviewed the CT neck from 2019 and my findings reveal:    Mildly enlarged right submandibular gland an severely hypoplastic left submandibular gland.  Remainder of neck CT unremarkable other than isolated thyroid nodule.    Assessment:       1. Enlarged salivary gland    2. Congenital absence of salivary gland           Plan:       Reassured submandibular gland is normal in architecture and only mildly enlarged, probably compensatory in nature due to lack of left submandibular gland. Otherwise asymptomatic.     Has appointment with endocrine for thyroid nodule.  Follow up with me as needed regarding this    FU with me prn

## 2019-08-22 ENCOUNTER — OFFICE VISIT (OUTPATIENT)
Dept: SURGERY | Facility: CLINIC | Age: 54
End: 2019-08-22
Payer: OTHER GOVERNMENT

## 2019-08-22 VITALS
WEIGHT: 176.56 LBS | SYSTOLIC BLOOD PRESSURE: 144 MMHG | TEMPERATURE: 97 F | DIASTOLIC BLOOD PRESSURE: 67 MMHG | BODY MASS INDEX: 26.76 KG/M2 | HEIGHT: 68 IN | HEART RATE: 69 BPM

## 2019-08-22 DIAGNOSIS — K43.2 INCISIONAL HERNIA, WITHOUT OBSTRUCTION OR GANGRENE: Primary | ICD-10-CM

## 2019-08-22 PROCEDURE — 99999 PR PBB SHADOW E&M-EST. PATIENT-LVL III: CPT | Mod: PBBFAC,,, | Performed by: SURGERY

## 2019-08-22 PROCEDURE — 99244 PR OFFICE CONSULTATION,LEVEL IV: ICD-10-PCS | Mod: S$PBB,,, | Performed by: SURGERY

## 2019-08-22 PROCEDURE — 99213 OFFICE O/P EST LOW 20 MIN: CPT | Mod: PBBFAC,PO | Performed by: SURGERY

## 2019-08-22 PROCEDURE — 99999 PR PBB SHADOW E&M-EST. PATIENT-LVL III: ICD-10-PCS | Mod: PBBFAC,,, | Performed by: SURGERY

## 2019-08-22 PROCEDURE — 99244 OFF/OP CNSLTJ NEW/EST MOD 40: CPT | Mod: S$PBB,,, | Performed by: SURGERY

## 2019-08-22 NOTE — Clinical Note
I saw your patient, Sonido Loera in the office.  Attached are my findings and plan.  Thank you for referring her to my office and if you have any questions please do not hesitate to call my cell [phone].Wade Gonzalez

## 2019-08-22 NOTE — LETTER
August 23, 2019      Norbert Mane MD  [address] [facility] [address]  [facility] [address]           [facility]  [address] [facility] [address] [place] [address]  Phone: [phone]          Patient: Sonido Loera   MR Number: [id number]   YOB: 1965   Date of Visit: 8/22/2019       Dear Dr. Norbert Mane:    Thank you for referring Sonido Loera to me for evaluation. Attached you will find relevant portions of my assessment and plan of care.    If you have questions, please do not hesitate to call me. I look forward to following Sonido Loera along with you.    Sincerely,    Alexis Gonzalez MD    Enclosure  CC:  No Recipients    If you would like to receive this communication electronically, please contact [email or web address] or [phone] to request more information on [employer] Link access.    For providers and/or their staff who would like to refer a patient to [facility], please contact us through our one-stop-shop provider referral line, [facility], at [phone].    If you feel you have received this communication in error or would no longer like to receive these types of communications, please e-mail [email or web address]

## 2019-08-23 NOTE — PROGRESS NOTES
Subjective:       Patient ID: Sonido Loera is a 53 y.o. female.    Chief Complaint: Consult (Hernia of abdominal wall)    HPI   Pleasant 52 yo F referred to me in consultation from Dr Lawrence for evaluation of abdominal hernia. Pt notes that she has felt hernia for over a year.  She notes that it seems to be getting larger and has been bothering her more lately.  States that she is frequently having to push it back in .  She denies n/v.  No fever/chills.  Pt notes that she is otherwise healthy.  She does question presence of lymphadenopathy seen on recent ct scan.  Pt PShx is significant for lap sergio and csection.  She does not smoke  Review of Systems   Constitutional: Negative for activity change, appetite change, fever and unexpected weight change.   HENT: Negative for congestion and hearing loss.    Respiratory: Negative for chest tightness, shortness of breath and wheezing.    Cardiovascular: Negative for chest pain.   Gastrointestinal: Positive for abdominal pain. Negative for abdominal distention, anal bleeding, blood in stool, constipation, diarrhea, nausea, rectal pain and vomiting.   Genitourinary: Negative for difficulty urinating, dysuria and frequency.   Skin: Negative for color change and wound.   Neurological: Negative for dizziness.   Hematological: Negative for adenopathy. Does not bruise/bleed easily.   Psychiatric/Behavioral: Negative for agitation and decreased concentration.       Objective:      Physical Exam   Constitutional: She is oriented to person, place, and time. She appears well-developed and well-nourished.   HENT:   Head: Normocephalic and atraumatic.   Eyes: Pupils are equal, round, and reactive to light.   Neck: Normal range of motion. Neck supple. No tracheal deviation present. No thyromegaly present.   Cardiovascular: Normal rate, regular rhythm and normal heart sounds.   No murmur heard.  Pulmonary/Chest: Effort normal and breath sounds normal. She exhibits no tenderness.    Abdominal: Soft. Bowel sounds are normal. She exhibits no distension, no abdominal bruit, no pulsatile midline mass and no mass. There is no hepatosplenomegaly. There is no tenderness. There is no rigidity, no rebound, no guarding, no tenderness at McBurney's point and negative Cruz's sign. No hernia. Hernia confirmed negative in the ventral area.       Genitourinary: Rectum normal.   Musculoskeletal: Normal range of motion.   Lymphadenopathy:     She has no axillary adenopathy.        Right: No inguinal and no supraclavicular adenopathy present.        Left: No inguinal and no supraclavicular adenopathy present.   No palpable inguinal lymphadenopathy   Neurological: She is alert and oriented to person, place, and time.   Skin: Skin is warm. No rash noted. No erythema.   Psychiatric: She has a normal mood and affect.   Vitals reviewed.        CT scan 7/19    Ventral hernia fat containing, small.  Small umbilical hernia  Pelvic lymph nodes: There are a few mildly prominent inguinal lymph nodes.  One index lymph node in the right inguinal region measures 10 mm in short axis on series 2, image 141.  No pathologically enlarged iliac chain or pelvic sidewall lymph nodes.  Assessment:     incisional hernia  Umbilical hernia  No diagnosis found.    Plan:       D/w pt.  First regarding the lymphadenopathy.  THis is likely physiologic.  Node minimally enlarged.  No palpable nodes.  Regarding the hernia given increasing discomfort I have recommended operative intervention at a time that is convenient to the pt.  Recommend robotic approach, would fix incisional as well as Umbilical.  Rissk and benefits were d/w pt.  Informed consent obtained.  Pt will call to schedule.

## 2019-08-27 ENCOUNTER — TELEPHONE (OUTPATIENT)
Dept: SURGERY | Facility: CLINIC | Age: 54
End: 2019-08-27

## 2019-08-27 ENCOUNTER — PATIENT MESSAGE (OUTPATIENT)
Dept: SURGERY | Facility: CLINIC | Age: 54
End: 2019-08-27

## 2019-08-27 NOTE — TELEPHONE ENCOUNTER
Surgery is scheduled for 10/07/19 arrival time will be given by the the preop nurse.  The preop nurse will call you from [phone]  Nothing to eat or drink after midnight.  Adult to drive you home.      THE PREOP NURSE WILL CALL, SOMETIMES AS LATE AS 4 or 5 PM IN THE AFTERNOON THE DAY BEFORE SURGERY.    Bathe the night before and the morning of your procedure with a Chlorhexidine wash such as Hibiclens, can be purchased at most Pharmacy's no prescription needed.    Special Instruction:  Your surgery is scheduled at the [facility] in [address] Dr[address] Lopez.

## 2019-08-28 RX ORDER — LIDOCAINE HYDROCHLORIDE 10 MG/ML
1 INJECTION, SOLUTION EPIDURAL; INFILTRATION; INTRACAUDAL; PERINEURAL ONCE
Status: CANCELLED | OUTPATIENT
Start: 2019-08-28 | End: 2019-08-28

## 2019-08-28 RX ORDER — SODIUM CHLORIDE 9 MG/ML
INJECTION, SOLUTION INTRAVENOUS CONTINUOUS
Status: CANCELLED | OUTPATIENT
Start: 2019-08-28

## 2019-08-28 RX ORDER — METRONIDAZOLE 500 MG/100ML
500 INJECTION, SOLUTION INTRAVENOUS
Status: CANCELLED | OUTPATIENT
Start: 2019-08-28

## 2019-08-29 ENCOUNTER — OFFICE VISIT (OUTPATIENT)
Dept: FAMILY MEDICINE | Facility: CLINIC | Age: 54
End: 2019-08-29
Payer: OTHER GOVERNMENT

## 2019-08-29 VITALS
BODY MASS INDEX: 26.66 KG/M2 | HEART RATE: 71 BPM | TEMPERATURE: 98 F | OXYGEN SATURATION: 98 % | WEIGHT: 175.94 LBS | HEIGHT: 68 IN | SYSTOLIC BLOOD PRESSURE: 124 MMHG | DIASTOLIC BLOOD PRESSURE: 82 MMHG

## 2019-08-29 DIAGNOSIS — K21.9 GASTROESOPHAGEAL REFLUX DISEASE WITHOUT ESOPHAGITIS: ICD-10-CM

## 2019-08-29 DIAGNOSIS — E78.2 MIXED HYPERLIPIDEMIA: ICD-10-CM

## 2019-08-29 DIAGNOSIS — K76.89 LIVER CYST: Primary | ICD-10-CM

## 2019-08-29 DIAGNOSIS — E04.1 THYROID NODULE: ICD-10-CM

## 2019-08-29 DIAGNOSIS — K42.9 UMBILICAL HERNIA WITHOUT OBSTRUCTION AND WITHOUT GANGRENE: ICD-10-CM

## 2019-08-29 PROCEDURE — 99999 PR PBB SHADOW E&M-EST. PATIENT-LVL III: CPT | Mod: PBBFAC,,, | Performed by: INTERNAL MEDICINE

## 2019-08-29 PROCEDURE — 99999 PR PBB SHADOW E&M-EST. PATIENT-LVL III: ICD-10-PCS | Mod: PBBFAC,,, | Performed by: INTERNAL MEDICINE

## 2019-08-29 PROCEDURE — 99213 OFFICE O/P EST LOW 20 MIN: CPT | Mod: PBBFAC,PO | Performed by: INTERNAL MEDICINE

## 2019-08-29 PROCEDURE — 99213 PR OFFICE/OUTPT VISIT, EST, LEVL III, 20-29 MIN: ICD-10-PCS | Mod: S$PBB,,, | Performed by: INTERNAL MEDICINE

## 2019-08-29 PROCEDURE — 99213 OFFICE O/P EST LOW 20 MIN: CPT | Mod: S$PBB,,, | Performed by: INTERNAL MEDICINE

## 2019-08-29 NOTE — PROGRESS NOTES
Patient ID: Sonido Loera     Chief Complaint:   Chief Complaint   Patient presents with    4 week office return        HPI: Follow up for labs and after she has seen a few specialists for various findings on scans. LDL and HDL both high and ratio good so we decided no meds for now- just lower fat in diet. Previous anemia resolved. + thyroid nodule - will see Damien soon. Had EGD for abd pains= GERD, but declines PPi for now. I advised low volumes of food for now. Has a liver cyst- will see Harry soon. Neck Ct for lymphadenopathy shows it's actually an enlarged salivary gland. Lung nodule not too concerning. Will get ventral hernia repaired in October and inguinal lymphadenopathy smaller- monitor for now.     Review of Systems   Constitutional: Negative.    HENT: Negative.    Eyes: Negative.    Respiratory: Negative.    Cardiovascular: Negative.    Gastrointestinal: Negative.    Endocrine: Negative.    Genitourinary: Negative.    Musculoskeletal: Negative.    Skin: Negative.    Allergic/Immunologic: Negative.    Neurological: Negative.    Hematological: Negative.    Psychiatric/Behavioral: Negative.    All other systems reviewed and are negative.         Objective:      Physical Exam   Physical Exam   Constitutional: She is oriented to person, place, and time. She appears well-developed and well-nourished.   HENT:   Head: Normocephalic and atraumatic.   Nose: Nose normal.   Mouth/Throat: Oropharynx is clear and moist.   Eyes: Pupils are equal, round, and reactive to light. Conjunctivae and EOM are normal.   Neck: Normal range of motion. Neck supple.   Cardiovascular: Normal rate, regular rhythm, normal heart sounds and intact distal pulses.   Pulmonary/Chest: Effort normal and breath sounds normal.   Abdominal: Soft. Bowel sounds are normal.   Musculoskeletal: Normal range of motion.   Neurological: She is alert and oriented to person, place, and time.   Skin: Skin is warm and dry. Capillary refill takes less than  "2 seconds.   Psychiatric: She has a normal mood and affect. Her behavior is normal. Judgment and thought content normal.   Nursing note and vitals reviewed.         Vitals:   Vitals:    08/29/19 0959   BP: 124/82   BP Location: Right arm   Patient Position: Sitting   BP Method: Small (Manual)   Pulse: 71   Temp: 98.1 °F (36.7 °C)   TempSrc: Oral   SpO2: 98%   Weight: 79.8 kg (175 lb 14.8 oz)   Height: 5' 8" (1.727 m)      Assessment:       Patient Active Problem List    Diagnosis Date Noted    Gastroesophageal reflux disease without esophagitis 08/29/2019    Thyroid nodule 08/29/2019    Mixed hyperlipidemia 08/29/2019    Umbilical hernia     RUQ abdominal pain 08/13/2019    Allergic rhinitis due to pollen     Fibromyalgia     Hernia of abdominal wall     Liver cyst           Plan:       Sonido was seen today for 4 week office return.    Diagnoses and all orders for this visit:    Liver cyst  Per Harry    Umbilical hernia without obstruction and without gangrene  To get Repaired soon    Gastroesophageal reflux disease without esophagitis  Monitor w/o meds for now     Thyroid nodule  To see Damien soon     Mixed hyperlipidemia  No  Statin for now as both LDL and HDL high and ratio good               "

## 2019-09-11 ENCOUNTER — OFFICE VISIT (OUTPATIENT)
Dept: ENDOCRINOLOGY | Facility: CLINIC | Age: 54
End: 2019-09-11
Payer: OTHER GOVERNMENT

## 2019-09-11 VITALS
DIASTOLIC BLOOD PRESSURE: 80 MMHG | WEIGHT: 176.13 LBS | BODY MASS INDEX: 26.69 KG/M2 | HEART RATE: 66 BPM | SYSTOLIC BLOOD PRESSURE: 138 MMHG | HEIGHT: 68 IN

## 2019-09-11 DIAGNOSIS — E04.1 THYROID NODULE: Primary | ICD-10-CM

## 2019-09-11 PROCEDURE — 99203 OFFICE O/P NEW LOW 30 MIN: CPT | Mod: S$PBB,,, | Performed by: INTERNAL MEDICINE

## 2019-09-11 PROCEDURE — 99999 PR PBB SHADOW E&M-EST. PATIENT-LVL III: CPT | Mod: PBBFAC,,, | Performed by: INTERNAL MEDICINE

## 2019-09-11 PROCEDURE — 99213 OFFICE O/P EST LOW 20 MIN: CPT | Mod: PBBFAC,PO | Performed by: INTERNAL MEDICINE

## 2019-09-11 PROCEDURE — 99999 PR PBB SHADOW E&M-EST. PATIENT-LVL III: ICD-10-PCS | Mod: PBBFAC,,, | Performed by: INTERNAL MEDICINE

## 2019-09-11 PROCEDURE — 99203 PR OFFICE/OUTPT VISIT, NEW, LEVL III, 30-44 MIN: ICD-10-PCS | Mod: S$PBB,,, | Performed by: INTERNAL MEDICINE

## 2019-09-11 NOTE — PROGRESS NOTES
CHIEF COMPLAINT: THyroid Nodule  53 year old being seen as a new patient. Recently had a CT scan showing a thyroid nodule. Had breast XRT but no head/neck XRT. No difficulty swallowing. Overall feeling well. No palpitations. No tremors.       PAST MEDICAL HISTORY/PAST SURGICAL HISTORY:  Reviewed in [facility]    SOCIAL HISTORY: Quit smoking 1980s. No alcohol    FAMILY HISTORY:  No known Thyroid disease. No DM 2    MEDICATIONS/ALLERGIES: The patient's MedCard has been updated and reviewed.      ROS:   Constitutional: weight increased  Eyes: No recent visual changes  ENT: No dysphagia  Cardiovascular: Denies current anginal symptoms  Respiratory: Denies current respiratory difficulty  Gastrointestinal: Denies recent bowel disturbances  GenitoUrinary - No dysuria  Skin: No new skin rash  Neurologic: No focal neurologic complaints  Remainder ROS negative        PE:    GENERAL: Well developed, well nourished.  PSYCH:  appropriate mood and affect  EYES:  PERRL, EOM intact.  ENT: Nares patent, oropharynx clear, mucosa pink,   NECK: Supple, trachea midline, left nodule palpable.   CHEST: Resp even and unlabored, CTA bilateral.  CARDIAC: RRR, S1, S2 heard, no murmurs, rubs, S3, or S4  ABDOMEN: Soft, non-tender, non-distended;  No organomegaly  VASCULAR:  DP pulses +2/4 bilaterally, no edema  NEURO: Gait steady, CN II-VII grossly intact  SKIN: No areas of breakdown, no acanthosis nigracans.    LABS   CT SOFT TISSUE NECK WITH CONTRAST    CLINICAL HISTORY:  Neck mass, nonpulsatile, solitary;Localized swelling, mass and lump, neck    TECHNIQUE:  Low dose axial images as well as sagittal and coronal reconstructions were performed from the skull base to the clavicles following the intravenous administration of 75 mL of Omnipaque 350.    COMPARISON:  None    FINDINGS:  A marker was placed on the skin of the right upper neck at the site of clinical concern.  This corresponds to the right submandibular fossa and an enlarged submandibular  gland which is homogeneous in attenuation without focal parenchymal abnormality.  The left submandibular gland is markedly decreased in size and is hypoplastic.  This may represent compensatory hypertrophy of the right gland.  There is no pathologic lymphadenopathy.  There are bilateral submandibular lymph nodes.  There also lymph nodes seen along the internal jugular chain bilaterally which are nonspecific, likely reactive.    The parotid glands are normal.  There are small periparotid lymph nodes as well.    There is a 1.7 x 1.1 cm nodule in the inferior pole of the left lobe of the thyroid gland.  This could be further evaluated with dedicated thyroid ultrasound.    The posterior nasopharyngeal soft tissues are normal.  The parapharyngeal fat spaces are preserved.  The epiglottis and laryngeal structures are normal.    The vessels in the neck enhance normally.  The airway is patent.    There is a 4 mm nodule in the anterior right upper lobe of the lung (series 2, image 170).  This is incompletely included and evaluated.      Impression       1. The palpable abnormality in the right upper neck corresponds to a prominent right-sided submandibular gland.  There is no focal abnormality within this enlarged gland.  The left submandibular gland is markedly decreased in volume likely hypoplastic with compensatory enlargement of the right submandibular gland.  Scattered lymph nodes in the neck are nonspecific, likely reactive.  2. There is a 1.7 x 1.1 cm heterogeneous but mostly hypodense nodule in the lower pole of the left lobe of the thyroid gland.  This could be further evaluated with dedicated thyroid ultrasound.  3. There is a 4 mm nodule in the right upper lobe of the lung anteriorly which is incompletely included and evaluated.            ASSESSMENT/PLAN:  1. Thyroid Nodule- no obstructive symptoms. Will get US to further assess thyroid nodule. Discussed criteria for FNA. Will also get TSH to verify not  suppressed.       FOLLOWUP  TSH and Thyroid US

## 2019-09-12 ENCOUNTER — TELEPHONE (OUTPATIENT)
Dept: ENDOCRINOLOGY | Facility: CLINIC | Age: 54
End: 2019-09-12

## 2019-09-12 ENCOUNTER — HOSPITAL ENCOUNTER (OUTPATIENT)
Dept: RADIOLOGY | Facility: HOSPITAL | Age: 54
Discharge: HOME | End: 2019-09-12
Attending: INTERNAL MEDICINE
Payer: OTHER GOVERNMENT

## 2019-09-12 DIAGNOSIS — E04.1 THYROID NODULE: ICD-10-CM

## 2019-09-12 PROCEDURE — 76536 US SOFT TISSUE HEAD NECK THYROID: ICD-10-PCS | Mod: 26,,, | Performed by: RADIOLOGY

## 2019-09-12 PROCEDURE — 76536 US EXAM OF HEAD AND NECK: CPT | Mod: 26,,, | Performed by: RADIOLOGY

## 2019-09-12 PROCEDURE — 76536 US EXAM OF HEAD AND NECK: CPT | Mod: TC,PO

## 2019-09-12 NOTE — TELEPHONE ENCOUNTER
Let her know that her left thyroid nodules meets criteria for FNA. Will need left FNA with molecular markers

## 2019-09-12 NOTE — TELEPHONE ENCOUNTER
Spoke to pt and adv of Dr Quezada's previous message. Scheduled FNA, adv date, time and location, voiced understanding.

## 2019-09-19 ENCOUNTER — INITIAL CONSULT (OUTPATIENT)
Dept: HEPATOLOGY | Facility: CLINIC | Age: 54
End: 2019-09-19
Payer: OTHER GOVERNMENT

## 2019-09-19 VITALS
DIASTOLIC BLOOD PRESSURE: 74 MMHG | RESPIRATION RATE: 12 BRPM | TEMPERATURE: 98 F | WEIGHT: 176.38 LBS | HEART RATE: 70 BPM | BODY MASS INDEX: 26.73 KG/M2 | HEIGHT: 68 IN | SYSTOLIC BLOOD PRESSURE: 155 MMHG

## 2019-09-19 DIAGNOSIS — R10.11 RUQ ABDOMINAL PAIN: ICD-10-CM

## 2019-09-19 DIAGNOSIS — K76.89 LIVER CYST: Primary | ICD-10-CM

## 2019-09-19 PROCEDURE — 99244 OFF/OP CNSLTJ NEW/EST MOD 40: CPT | Mod: S$PBB,,, | Performed by: INTERNAL MEDICINE

## 2019-09-19 PROCEDURE — 99214 OFFICE O/P EST MOD 30 MIN: CPT | Mod: PBBFAC,PO | Performed by: INTERNAL MEDICINE

## 2019-09-19 PROCEDURE — 99244 PR OFFICE CONSULTATION,LEVEL IV: ICD-10-PCS | Mod: S$PBB,,, | Performed by: INTERNAL MEDICINE

## 2019-09-19 PROCEDURE — 99999 PR PBB SHADOW E&M-EST. PATIENT-LVL IV: CPT | Mod: PBBFAC,,, | Performed by: INTERNAL MEDICINE

## 2019-09-19 PROCEDURE — 99999 PR PBB SHADOW E&M-EST. PATIENT-LVL IV: ICD-10-PCS | Mod: PBBFAC,,, | Performed by: INTERNAL MEDICINE

## 2019-09-19 NOTE — LETTER
September 19, 2019      Teressa Subramanian, FNP  [address] [facility] [address] [place] [address]           [facility]at[facility]  [address]  [facility] [address]  Phone: [phone]          Patient: Sonido Loera   MR Number: [id number]   YOB: 1965   Date of Visit: 9/19/2019       Dear Teressa Subramanian:    Thank you for referring Sonido Loera to me for evaluation. Attached you will find relevant portions of my assessment and plan of care.    If you have questions, please do not hesitate to call me. I look forward to following Sonido Loera along with you.    Sincerely,    Renetta Mcdonald MD    Enclosure  CC:  No Recipients    If you would like to receive this communication electronically, please contact [email or web address] or [phone] to request more information on [employer] Link access.    For providers and/or their staff who would like to refer a patient to [facility], please contact us through our one-stop-shop provider referral line, [facility], at [phone].    If you feel you have received this communication in error or would no longer like to receive these types of communications, please e-mail [email or web address]

## 2019-09-19 NOTE — PROGRESS NOTES
"   [facility] Consultation Note    Reason for Consult:  The primary encounter diagnosis was Liver cyst. A diagnosis of RUQ abdominal pain was also pertinent to this visit.    PCP: Norbert Mane   [address] [facility] [place] [address]    HPI:  This is a 54 y.o. female here for evaluation of: liver cyst    53 y/o female was evaluated for RUQ abd pain, when a couple of cysts were seen in the liver.  Abd pain is sharp burning in nature, radiated through to the back, "feels like a hot felipe is poked into the abdomen and directed towards the back", has occurred several times throughout the yrs. Pain is not related to meals or bowel movements.  It was thought to be related to the GB, hence she had a lap cholecystectomy, and the pain remained.  She had an EGD which apparently showed some inflammation in the stomach.  Colonoscopy showed twisted colon.      Imaging revealing the cysts are as follows:     US 5/30/19:  liver is normal in size measuring 14 cm in sagittal dimension.  The liver demonstrates a normal homogeneous parenchymal echotexture without fatty infiltration.  There is a 20 x 19 x 19 mm simple cyst present within the right hepatic lobe anteriorly..  The portal vein is patent and shows normal directional flow.The IVC is patent where visualized. Biliary system: The gallbladder is surgically absent.  The common duct is not dilated, measuring 4.5 mm. No intrahepatic biliary dilatation appreciated.    CT scan 7/11/19:  Hepatobiliary system: The liver is normal in size and attenuation without fatty infiltration.  There is a 19 mm hypodensity in the right hepatic lobe which measures 10 HU compatible with a cyst (series 2, image 26).  There is a 4 mm hypodensity within the lateral segment of the left hepatic lobe (series 2 image 36), too small to definitively characterize by CT but possibly representative of a cyst.  There is a 3 mm hypodensity present within the right hepatic lobe on series 2, image 37, " too small to characterize.  The portal vein is patent.  No biliary dilatation. Two splenic hypodensities which measure 5 mm and 7 mm respectively, possibly splenic pseudocysts but not well characterized by CT.        Elevated liver enzymes: No  Abnormal imaging: Yes  Cirrhosis: No  Hepatitis C: No  Hepatitis B: No  Fatty liver: No  Encephalopathy: No  Post-hospital discharge: No  Alcohol: none  Symptoms: none related to liver      ROS:  Constitutional: neg  GI/Liver: see HPI  No swelling in the feet or abdomen    Medical History:  has a past medical history of Allergic rhinitis due to pollen, Breast cancer (), Colon polyp, Encounter for blood transfusion, Fibromyalgia, Hernia of abdominal wall, Joint pain, Liver cyst, Thyroid nodule, and Umbilical hernia.    Surgical History:  has a past surgical history that includes  section; Cholecystectomy; Vein Surgery; Breast lumpectomy (Left, ); Colonoscopy (2015); and Esophagogastroduodenoscopy (N/A, 2019).    Family History: family history includes AVM in her sister; Breast cancer (age of onset: 78) in her maternal aunt; Breast cancer (age of onset: 82) in her mother; Chronic infections in her sister; Cirrhosis in her maternal grandmother; Heart disease in her father; Liver cancer in her paternal aunt; No Known Problems in her brother; Rhinitis in her daughter..     Social History:  reports that she quit smoking about 36 years ago. Her smoking use included cigarettes. She has a 3.00 pack-year smoking history. She has never used smokeless tobacco. She reports that she drinks about 0.6 oz of alcohol per week. She reports that she does not use drugs.    Review of patient's allergies indicates:   Allergen Reactions    Gabapentin Other (See Comments)     dizzy    Tamoxifen analogues Other (See Comments)     Unclear thinking, shoulder pain    Adhesive Rash     Rash on contact       Current Outpatient Medications   Medication Sig    ascorbic acid  "(VITAMIN C) 500 MG tablet Take 500 mg by mouth once daily.    b complex vitamins capsule Take 1 capsule by mouth once daily.    cetirizine (ZYRTEC) 10 MG tablet Take 10 mg by mouth once daily.    loratadine (CLARITIN) 10 mg tablet Take 10 mg by mouth once daily.    multivitamin (ONE DAILY MULTIVITAMIN) per tablet Take 1 tablet by mouth once daily.    naltrexone capsule Take 1 capsule (4.5 mg total) by mouth every evening.    naproxen sodium (ANAPROX) 220 MG tablet Take 220 mg by mouth every 12 (twelve) hours.    NON FORMULARY MEDICATION Take 1 tablet by mouth once daily.      No current facility-administered medications for this visit.        Objective Findings:    Vital Signs:  BP (!) 155/74   Pulse 70   Temp 98.1 °F (36.7 °C)   Resp 12   Ht 5' 8" (1.727 m)   Wt 80 kg (176 lb 5.9 oz)   LMP 07/16/2011   BMI 26.82 kg/m²   Body mass index is 26.82 kg/m².    Physical Exam:  General Appearance: Well appearing in no acute distress  Head:   Normocephalic, without obvious abnormality  Eyes:    No scleral icterus, EOMI  Abdomen: non tender, non distended.   Extremities: no edema  Skin: No rash  Neurologic: alert, oriented x 3    Labs:  Lab Results   Component Value Date    WBC 5.32 07/19/2019    HGB 12.6 07/19/2019    HCT 39.3 07/19/2019     07/19/2019    CHOL 226 (H) 07/19/2019    TRIG 41 07/19/2019    HDL 76 (H) 07/19/2019    CREATININE 0.8 07/19/2019    BUN 13 07/19/2019    BILITOT 0.4 07/19/2019    ALT 25 07/19/2019    AST 23 07/19/2019    ALKPHOS 63 07/19/2019     07/19/2019    K 4.5 07/19/2019     07/19/2019    CO2 31 (H) 07/19/2019    TSH 0.916 09/12/2019       Imaging:   As above    Endoscopy:    EGD: gastritis/duodenitis??  Colonoscopy: twisted colon    Assessment:  1. Liver cyst    2. RUQ abdominal pain    -  Two small hepatic cysts, larger 20 mm in size, no septations.  Reassured patient and  that the liver cysts are benign, too small to explain the RUQ abd pain.  -  Two " tiny splenic cysts possible. They are tiny.   -   chronic RUQ abd pain - s/p sergio, doubt related to bile ducts, as CBD normal and no intrahep biliary dilation.  Pain could be related to pancreas as it radiates to the back, or it could be duodenitis.  Or it could be colonic pain from twisted colon.  She needs to see GI MD. Check with PCP for referral.           Recommendations:  -  No need to repeat imaging or further evaluation.   -  Return PRN      Follow up if symptoms worsen or fail to improve.      Order summary:  No orders of the defined types were placed in this encounter.      Thank you so much for allowing me to participate in the care of Sonido Mcdonald MD

## 2019-10-03 ENCOUNTER — PATIENT MESSAGE (OUTPATIENT)
Dept: PREADMISSION TESTING | Facility: HOSPITAL | Age: 54
End: 2019-10-03

## 2019-10-05 ENCOUNTER — ANESTHESIA EVENT (OUTPATIENT)
Dept: SURGERY | Facility: HOSPITAL | Age: 54
End: 2019-10-05
Payer: OTHER GOVERNMENT

## 2019-10-07 ENCOUNTER — HOSPITAL ENCOUNTER (OUTPATIENT)
Facility: HOSPITAL | Age: 54
Discharge: HOME | End: 2019-10-07
Attending: SURGERY | Admitting: SURGERY
Payer: OTHER GOVERNMENT

## 2019-10-07 ENCOUNTER — ANESTHESIA (OUTPATIENT)
Dept: SURGERY | Facility: HOSPITAL | Age: 54
End: 2019-10-07
Payer: OTHER GOVERNMENT

## 2019-10-07 VITALS
TEMPERATURE: 97 F | SYSTOLIC BLOOD PRESSURE: 114 MMHG | WEIGHT: 176 LBS | HEART RATE: 60 BPM | DIASTOLIC BLOOD PRESSURE: 59 MMHG | RESPIRATION RATE: 18 BRPM | OXYGEN SATURATION: 100 % | HEIGHT: 68 IN | BODY MASS INDEX: 26.67 KG/M2

## 2019-10-07 DIAGNOSIS — K43.2 INCISIONAL HERNIA, WITHOUT OBSTRUCTION OR GANGRENE: ICD-10-CM

## 2019-10-07 DIAGNOSIS — K43.9 HERNIA OF ABDOMINAL WALL: Primary | ICD-10-CM

## 2019-10-07 DIAGNOSIS — Z01.818 PRE-OP EXAMINATION: ICD-10-CM

## 2019-10-07 LAB
ALBUMIN SERPL BCP-MCNC: 4.4 G/DL (ref 3.5–5.2)
ALP SERPL-CCNC: 67 U/L (ref 55–135)
ALT SERPL W/O P-5'-P-CCNC: 23 U/L (ref 10–44)
ANION GAP SERPL CALC-SCNC: 8 MMOL/L (ref 8–16)
AST SERPL-CCNC: 19 U/L (ref 10–40)
BASOPHILS # BLD AUTO: 0.03 K/UL (ref 0–0.2)
BASOPHILS NFR BLD: 0.5 % (ref 0–1.9)
BILIRUB SERPL-MCNC: 0.4 MG/DL (ref 0.1–1)
BUN SERPL-MCNC: 13 MG/DL (ref 6–20)
CALCIUM SERPL-MCNC: 9.9 MG/DL (ref 8.7–10.5)
CHLORIDE SERPL-SCNC: 104 MMOL/L (ref 95–110)
CO2 SERPL-SCNC: 30 MMOL/L (ref 23–29)
CREAT SERPL-MCNC: 0.7 MG/DL (ref 0.5–1.4)
DIFFERENTIAL METHOD: NORMAL
EOSINOPHIL # BLD AUTO: 0.4 K/UL (ref 0–0.5)
EOSINOPHIL NFR BLD: 6 % (ref 0–8)
ERYTHROCYTE [DISTWIDTH] IN BLOOD BY AUTOMATED COUNT: 13.3 % (ref 11.5–14.5)
EST. GFR  (AFRICAN AMERICAN): >60 ML/MIN/1.73 M^2
EST. GFR  (NON AFRICAN AMERICAN): >60 ML/MIN/1.73 M^2
GLUCOSE SERPL-MCNC: 87 MG/DL (ref 70–110)
HCT VFR BLD AUTO: 39.5 % (ref 37–48.5)
HGB BLD-MCNC: 12.7 G/DL (ref 12–16)
IMM GRANULOCYTES # BLD AUTO: 0.02 K/UL (ref 0–0.04)
LYMPHOCYTES # BLD AUTO: 2.2 K/UL (ref 1–4.8)
LYMPHOCYTES NFR BLD: 34.4 % (ref 18–48)
MCH RBC QN AUTO: 29 PG (ref 27–31)
MCHC RBC AUTO-ENTMCNC: 32.2 G/DL (ref 32–36)
MCV RBC AUTO: 90 FL (ref 82–98)
MONOCYTES # BLD AUTO: 0.6 K/UL (ref 0.3–1)
MONOCYTES NFR BLD: 9.3 % (ref 4–15)
NEUTROPHILS # BLD AUTO: 3.2 K/UL (ref 1.8–7.7)
NEUTROPHILS NFR BLD: 49.5 % (ref 38–73)
NRBC BLD-RTO: 0 /100 WBC
PLATELET # BLD AUTO: 234 K/UL (ref 150–350)
PMV BLD AUTO: 9.5 FL (ref 9.2–12.9)
POTASSIUM SERPL-SCNC: 4 MMOL/L (ref 3.5–5.1)
PROT SERPL-MCNC: 7.5 G/DL (ref 6–8.4)
RBC # BLD AUTO: 4.38 M/UL (ref 4–5.4)
SODIUM SERPL-SCNC: 142 MMOL/L (ref 136–145)
WBC # BLD AUTO: 6.46 K/UL (ref 3.9–12.7)

## 2019-10-07 PROCEDURE — 71000015 HC POSTOP RECOV 1ST HR: Performed by: SURGERY

## 2019-10-07 PROCEDURE — 25000003 PHARM REV CODE 250: Performed by: NURSE ANESTHETIST, CERTIFIED REGISTERED

## 2019-10-07 PROCEDURE — 99900104 DSU ONLY-NO CHARGE-EA ADD'L HR (STAT): Performed by: SURGERY

## 2019-10-07 PROCEDURE — C9290 INJ, BUPIVACAINE LIPOSOME: HCPCS | Performed by: SURGERY

## 2019-10-07 PROCEDURE — D9220A PRA ANESTHESIA: ICD-10-PCS | Mod: CRNA,,, | Performed by: NURSE ANESTHETIST, CERTIFIED REGISTERED

## 2019-10-07 PROCEDURE — S0030 INJECTION, METRONIDAZOLE: HCPCS | Performed by: SURGERY

## 2019-10-07 PROCEDURE — 37000009 HC ANESTHESIA EA ADD 15 MINS: Performed by: SURGERY

## 2019-10-07 PROCEDURE — 25000003 PHARM REV CODE 250: Performed by: ANESTHESIOLOGY

## 2019-10-07 PROCEDURE — D9220A PRA ANESTHESIA: Mod: CRNA,,, | Performed by: NURSE ANESTHETIST, CERTIFIED REGISTERED

## 2019-10-07 PROCEDURE — 37000008 HC ANESTHESIA 1ST 15 MINUTES: Performed by: SURGERY

## 2019-10-07 PROCEDURE — 49652 PR LAP VENTRAL/UMBILICAL HERNIA; REDUCIBLE: CPT | Mod: ,,, | Performed by: SURGERY

## 2019-10-07 PROCEDURE — 25000003 PHARM REV CODE 250: Performed by: SURGERY

## 2019-10-07 PROCEDURE — 63600175 PHARM REV CODE 636 W HCPCS: Performed by: SURGERY

## 2019-10-07 PROCEDURE — D9220A PRA ANESTHESIA: Mod: ANES,,, | Performed by: ANESTHESIOLOGY

## 2019-10-07 PROCEDURE — 63600175 PHARM REV CODE 636 W HCPCS: Performed by: ANESTHESIOLOGY

## 2019-10-07 PROCEDURE — 80053 COMPREHEN METABOLIC PANEL: CPT

## 2019-10-07 PROCEDURE — 71000016 HC POSTOP RECOV ADDL HR: Performed by: SURGERY

## 2019-10-07 PROCEDURE — 00832 ANES HERNIA REPAIR VNT&INCAL: CPT | Performed by: SURGERY

## 2019-10-07 PROCEDURE — 71000033 HC RECOVERY, INTIAL HOUR: Performed by: SURGERY

## 2019-10-07 PROCEDURE — 36000711: Performed by: SURGERY

## 2019-10-07 PROCEDURE — 99900103 DSU ONLY-NO CHARGE-INITIAL HR (STAT): Performed by: SURGERY

## 2019-10-07 PROCEDURE — 93010 EKG 12-LEAD: ICD-10-PCS | Mod: ,,, | Performed by: INTERNAL MEDICINE

## 2019-10-07 PROCEDURE — 63600175 PHARM REV CODE 636 W HCPCS: Performed by: NURSE ANESTHETIST, CERTIFIED REGISTERED

## 2019-10-07 PROCEDURE — C1781 MESH (IMPLANTABLE): HCPCS | Performed by: SURGERY

## 2019-10-07 PROCEDURE — 49652 PR LAP VENTRAL/UMBILICAL HERNIA; REDUCIBLE: ICD-10-PCS | Mod: ,,, | Performed by: SURGERY

## 2019-10-07 PROCEDURE — D9220A PRA ANESTHESIA: ICD-10-PCS | Mod: ANES,,, | Performed by: ANESTHESIOLOGY

## 2019-10-07 PROCEDURE — 93010 ELECTROCARDIOGRAM REPORT: CPT | Mod: ,,, | Performed by: INTERNAL MEDICINE

## 2019-10-07 PROCEDURE — 85025 COMPLETE CBC W/AUTO DIFF WBC: CPT

## 2019-10-07 PROCEDURE — 36415 COLL VENOUS BLD VENIPUNCTURE: CPT

## 2019-10-07 PROCEDURE — 36000710: Performed by: SURGERY

## 2019-10-07 PROCEDURE — 93005 ELECTROCARDIOGRAM TRACING: CPT | Mod: 59

## 2019-10-07 PROCEDURE — 71000039 HC RECOVERY, EACH ADD'L HOUR: Performed by: SURGERY

## 2019-10-07 DEVICE — MESH VENTRALIGHT ECHO2 CIR 11: Type: IMPLANTABLE DEVICE | Site: ABDOMEN | Status: FUNCTIONAL

## 2019-10-07 RX ORDER — OXYCODONE HYDROCHLORIDE 5 MG/1
5 TABLET ORAL
Status: DISCONTINUED | OUTPATIENT
Start: 2019-10-07 | End: 2019-10-07 | Stop reason: SDUPTHER

## 2019-10-07 RX ORDER — SODIUM CHLORIDE 0.9 % (FLUSH) 0.9 %
3 SYRINGE (ML) INJECTION
Status: DISCONTINUED | OUTPATIENT
Start: 2019-10-07 | End: 2019-10-07 | Stop reason: HOSPADM

## 2019-10-07 RX ORDER — ONDANSETRON 2 MG/ML
4 INJECTION INTRAMUSCULAR; INTRAVENOUS DAILY PRN
Status: DISCONTINUED | OUTPATIENT
Start: 2019-10-07 | End: 2019-10-07 | Stop reason: SDUPTHER

## 2019-10-07 RX ORDER — METRONIDAZOLE 500 MG/100ML
500 INJECTION, SOLUTION INTRAVENOUS
Status: COMPLETED | OUTPATIENT
Start: 2019-10-07 | End: 2019-10-07

## 2019-10-07 RX ORDER — MIDAZOLAM HYDROCHLORIDE 1 MG/ML
INJECTION INTRAMUSCULAR; INTRAVENOUS
Status: DISCONTINUED | OUTPATIENT
Start: 2019-10-07 | End: 2019-10-07

## 2019-10-07 RX ORDER — GLYCOPYRROLATE 0.2 MG/ML
INJECTION INTRAMUSCULAR; INTRAVENOUS
Status: DISCONTINUED | OUTPATIENT
Start: 2019-10-07 | End: 2019-10-07

## 2019-10-07 RX ORDER — LIDOCAINE HYDROCHLORIDE 10 MG/ML
1 INJECTION, SOLUTION EPIDURAL; INFILTRATION; INTRACAUDAL; PERINEURAL ONCE
Status: DISCONTINUED | OUTPATIENT
Start: 2019-10-07 | End: 2019-10-07 | Stop reason: HOSPADM

## 2019-10-07 RX ORDER — PHENYLEPHRINE HYDROCHLORIDE 10 MG/ML
INJECTION INTRAVENOUS
Status: DISCONTINUED | OUTPATIENT
Start: 2019-10-07 | End: 2019-10-07

## 2019-10-07 RX ORDER — FENTANYL CITRATE 50 UG/ML
25 INJECTION, SOLUTION INTRAMUSCULAR; INTRAVENOUS EVERY 5 MIN PRN
Status: DISCONTINUED | OUTPATIENT
Start: 2019-10-07 | End: 2019-10-07 | Stop reason: SDUPTHER

## 2019-10-07 RX ORDER — SODIUM CHLORIDE, SODIUM LACTATE, POTASSIUM CHLORIDE, CALCIUM CHLORIDE 600; 310; 30; 20 MG/100ML; MG/100ML; MG/100ML; MG/100ML
INJECTION, SOLUTION INTRAVENOUS CONTINUOUS
Status: DISCONTINUED | OUTPATIENT
Start: 2019-10-07 | End: 2019-10-07 | Stop reason: HOSPADM

## 2019-10-07 RX ORDER — ONDANSETRON HYDROCHLORIDE 2 MG/ML
INJECTION, SOLUTION INTRAMUSCULAR; INTRAVENOUS
Status: DISCONTINUED | OUTPATIENT
Start: 2019-10-07 | End: 2019-10-07

## 2019-10-07 RX ORDER — OXYCODONE HYDROCHLORIDE 5 MG/1
5 TABLET ORAL
Status: DISCONTINUED | OUTPATIENT
Start: 2019-10-07 | End: 2019-10-07 | Stop reason: HOSPADM

## 2019-10-07 RX ORDER — ONDANSETRON 2 MG/ML
4 INJECTION INTRAMUSCULAR; INTRAVENOUS EVERY 12 HOURS PRN
Status: DISCONTINUED | OUTPATIENT
Start: 2019-10-07 | End: 2019-10-07 | Stop reason: HOSPADM

## 2019-10-07 RX ORDER — VECURONIUM BROMIDE FOR INJECTION 1 MG/ML
INJECTION, POWDER, LYOPHILIZED, FOR SOLUTION INTRAVENOUS
Status: DISCONTINUED | OUTPATIENT
Start: 2019-10-07 | End: 2019-10-07

## 2019-10-07 RX ORDER — FENTANYL CITRATE 50 UG/ML
INJECTION, SOLUTION INTRAMUSCULAR; INTRAVENOUS
Status: DISCONTINUED | OUTPATIENT
Start: 2019-10-07 | End: 2019-10-07

## 2019-10-07 RX ORDER — FENTANYL CITRATE 50 UG/ML
25 INJECTION, SOLUTION INTRAMUSCULAR; INTRAVENOUS EVERY 5 MIN PRN
Status: DISCONTINUED | OUTPATIENT
Start: 2019-10-07 | End: 2019-10-07 | Stop reason: HOSPADM

## 2019-10-07 RX ORDER — HYDROCODONE BITARTRATE AND ACETAMINOPHEN 5; 325 MG/1; MG/1
1 TABLET ORAL EVERY 6 HOURS PRN
Qty: 20 TABLET | Refills: 0 | Status: SHIPPED | OUTPATIENT
Start: 2019-10-07 | End: 2019-11-22

## 2019-10-07 RX ORDER — BUPIVACAINE HYDROCHLORIDE AND EPINEPHRINE 2.5; 5 MG/ML; UG/ML
INJECTION, SOLUTION EPIDURAL; INFILTRATION; INTRACAUDAL; PERINEURAL
Status: DISCONTINUED | OUTPATIENT
Start: 2019-10-07 | End: 2019-10-07 | Stop reason: HOSPADM

## 2019-10-07 RX ORDER — LIDOCAINE HYDROCHLORIDE 10 MG/ML
1 INJECTION, SOLUTION EPIDURAL; INFILTRATION; INTRACAUDAL; PERINEURAL ONCE
Status: COMPLETED | OUTPATIENT
Start: 2019-10-07 | End: 2019-10-07

## 2019-10-07 RX ORDER — SODIUM CHLORIDE, SODIUM LACTATE, POTASSIUM CHLORIDE, CALCIUM CHLORIDE 600; 310; 30; 20 MG/100ML; MG/100ML; MG/100ML; MG/100ML
INJECTION, SOLUTION INTRAVENOUS CONTINUOUS
Status: DISCONTINUED | OUTPATIENT
Start: 2019-10-07 | End: 2019-10-07

## 2019-10-07 RX ORDER — EPHEDRINE SULFATE 50 MG/ML
INJECTION, SOLUTION INTRAVENOUS
Status: DISCONTINUED | OUTPATIENT
Start: 2019-10-07 | End: 2019-10-07

## 2019-10-07 RX ORDER — LIDOCAINE HCL/PF 100 MG/5ML
SYRINGE (ML) INTRAVENOUS
Status: DISCONTINUED | OUTPATIENT
Start: 2019-10-07 | End: 2019-10-07

## 2019-10-07 RX ORDER — NEOSTIGMINE METHYLSULFATE 1 MG/ML
INJECTION, SOLUTION INTRAVENOUS
Status: DISCONTINUED | OUTPATIENT
Start: 2019-10-07 | End: 2019-10-07

## 2019-10-07 RX ORDER — DEXAMETHASONE SODIUM PHOSPHATE 4 MG/ML
INJECTION, SOLUTION INTRA-ARTICULAR; INTRALESIONAL; INTRAMUSCULAR; INTRAVENOUS; SOFT TISSUE
Status: DISCONTINUED | OUTPATIENT
Start: 2019-10-07 | End: 2019-10-07

## 2019-10-07 RX ORDER — ACETAMINOPHEN 10 MG/ML
INJECTION, SOLUTION INTRAVENOUS
Status: DISCONTINUED | OUTPATIENT
Start: 2019-10-07 | End: 2019-10-07

## 2019-10-07 RX ORDER — HYDROCODONE BITARTRATE AND ACETAMINOPHEN 5; 325 MG/1; MG/1
1 TABLET ORAL EVERY 4 HOURS PRN
Status: DISCONTINUED | OUTPATIENT
Start: 2019-10-07 | End: 2019-10-07 | Stop reason: HOSPADM

## 2019-10-07 RX ORDER — ROCURONIUM BROMIDE 10 MG/ML
INJECTION, SOLUTION INTRAVENOUS
Status: DISCONTINUED | OUTPATIENT
Start: 2019-10-07 | End: 2019-10-07

## 2019-10-07 RX ORDER — BACITRACIN 50000 [IU]/1
INJECTION, POWDER, FOR SOLUTION INTRAMUSCULAR
Status: DISCONTINUED | OUTPATIENT
Start: 2019-10-07 | End: 2019-10-07 | Stop reason: HOSPADM

## 2019-10-07 RX ORDER — SODIUM CHLORIDE 9 MG/ML
INJECTION, SOLUTION INTRAVENOUS CONTINUOUS
Status: DISCONTINUED | OUTPATIENT
Start: 2019-10-07 | End: 2019-10-07 | Stop reason: HOSPADM

## 2019-10-07 RX ORDER — PROPOFOL 10 MG/ML
VIAL (ML) INTRAVENOUS
Status: DISCONTINUED | OUTPATIENT
Start: 2019-10-07 | End: 2019-10-07

## 2019-10-07 RX ORDER — ONDANSETRON 2 MG/ML
4 INJECTION INTRAMUSCULAR; INTRAVENOUS DAILY PRN
Status: DISCONTINUED | OUTPATIENT
Start: 2019-10-07 | End: 2019-10-07 | Stop reason: HOSPADM

## 2019-10-07 RX ADMIN — ACETAMINOPHEN 1000 MG: 10 INJECTION, SOLUTION INTRAVENOUS at 08:10

## 2019-10-07 RX ADMIN — DEXAMETHASONE SODIUM PHOSPHATE 8 MG: 4 INJECTION, SOLUTION INTRAMUSCULAR; INTRAVENOUS at 07:10

## 2019-10-07 RX ADMIN — FENTANYL CITRATE 25 MCG: 0.05 INJECTION, SOLUTION INTRAMUSCULAR; INTRAVENOUS at 10:10

## 2019-10-07 RX ADMIN — PHENYLEPHRINE HYDROCHLORIDE 100 MCG: 10 INJECTION INTRAVENOUS at 07:10

## 2019-10-07 RX ADMIN — SODIUM CHLORIDE, SODIUM LACTATE, POTASSIUM CHLORIDE, AND CALCIUM CHLORIDE: .6; .31; .03; .02 INJECTION, SOLUTION INTRAVENOUS at 07:10

## 2019-10-07 RX ADMIN — LIDOCAINE HYDROCHLORIDE: 10 INJECTION, SOLUTION EPIDURAL; INFILTRATION; INTRACAUDAL; PERINEURAL at 07:10

## 2019-10-07 RX ADMIN — VECURONIUM BROMIDE FOR INJECTION 2 MG: 1 INJECTION, POWDER, LYOPHILIZED, FOR SOLUTION INTRAVENOUS at 08:10

## 2019-10-07 RX ADMIN — CEFTRIAXONE 2 G: 2 INJECTION, SOLUTION INTRAVENOUS at 08:10

## 2019-10-07 RX ADMIN — ROCURONIUM BROMIDE 20 MG: 10 INJECTION, SOLUTION INTRAVENOUS at 07:10

## 2019-10-07 RX ADMIN — ONDANSETRON 4 MG: 2 INJECTION, SOLUTION INTRAMUSCULAR; INTRAVENOUS at 09:10

## 2019-10-07 RX ADMIN — PROPOFOL 200 MG: 10 INJECTION, EMULSION INTRAVENOUS at 07:10

## 2019-10-07 RX ADMIN — FENTANYL CITRATE 50 MCG: 50 INJECTION, SOLUTION INTRAMUSCULAR; INTRAVENOUS at 08:10

## 2019-10-07 RX ADMIN — OXYCODONE HYDROCHLORIDE 5 MG: 5 TABLET ORAL at 09:10

## 2019-10-07 RX ADMIN — VECURONIUM BROMIDE FOR INJECTION 1 MG: 1 INJECTION, POWDER, LYOPHILIZED, FOR SOLUTION INTRAVENOUS at 08:10

## 2019-10-07 RX ADMIN — FENTANYL CITRATE 100 MCG: 50 INJECTION, SOLUTION INTRAMUSCULAR; INTRAVENOUS at 07:10

## 2019-10-07 RX ADMIN — LIDOCAINE HYDROCHLORIDE 80 MG: 20 INJECTION, SOLUTION INTRAVENOUS at 07:10

## 2019-10-07 RX ADMIN — GLYCOPYRROLATE 0.2 MG: 0.2 INJECTION, SOLUTION INTRAMUSCULAR; INTRAVENOUS at 08:10

## 2019-10-07 RX ADMIN — EPHEDRINE SULFATE 5 MG: 50 INJECTION, SOLUTION INTRAMUSCULAR; INTRAVENOUS; SUBCUTANEOUS at 08:10

## 2019-10-07 RX ADMIN — NEOSTIGMINE METHYLSULFATE 3.5 MG: 1 INJECTION INTRAVENOUS at 09:10

## 2019-10-07 RX ADMIN — SODIUM CHLORIDE, SODIUM LACTATE, POTASSIUM CHLORIDE, AND CALCIUM CHLORIDE: .6; .31; .03; .02 INJECTION, SOLUTION INTRAVENOUS at 09:10

## 2019-10-07 RX ADMIN — METRONIDAZOLE 500 MG: 500 INJECTION, SOLUTION INTRAVENOUS at 07:10

## 2019-10-07 RX ADMIN — GLYCOPYRROLATE 0.6 MG: 0.2 INJECTION, SOLUTION INTRAMUSCULAR; INTRAVENOUS at 09:10

## 2019-10-07 RX ADMIN — FENTANYL CITRATE 50 MCG: 50 INJECTION, SOLUTION INTRAMUSCULAR; INTRAVENOUS at 09:10

## 2019-10-07 RX ADMIN — PHENYLEPHRINE HYDROCHLORIDE 100 MCG: 10 INJECTION INTRAVENOUS at 08:10

## 2019-10-07 RX ADMIN — ROCURONIUM BROMIDE 30 MG: 10 INJECTION, SOLUTION INTRAVENOUS at 07:10

## 2019-10-07 RX ADMIN — MIDAZOLAM HYDROCHLORIDE 2 MG: 1 INJECTION, SOLUTION INTRAMUSCULAR; INTRAVENOUS at 07:10

## 2019-10-07 NOTE — ANESTHESIA POSTPROCEDURE EVALUATION
Anesthesia Post Evaluation    Patient: Sonido Loera    Procedure(s) Performed: Procedure(s) (LRB):  ROBOTIC REPAIR, HERNIA, INCISIONAL (N/A)    Final Anesthesia Type: general  Patient location during evaluation: PACU  Patient participation: Yes- Able to Participate  Level of consciousness: awake and alert  Post-procedure vital signs: reviewed and stable  Pain management: adequate  Airway patency: patent  PONV status at discharge: No PONV  Anesthetic complications: no      Cardiovascular status: blood pressure returned to baseline and hemodynamically stable  Respiratory status: unassisted  Hydration status: euvolemic  Follow-up not needed.          Vitals Value Taken Time   /59 10/7/2019 12:20 PM   Temp 36.3 °C (97.4 °F) 10/7/2019 10:50 AM   Pulse 60 10/7/2019 12:20 PM   Resp 18 10/7/2019 12:20 PM   SpO2 100 % 10/7/2019 12:20 PM         Event Time     Out of Recovery 10:48:00          Pain/Waqas Score: Pain Rating Prior to Med Admin: 5 (10/7/2019 10:45 AM)  Waqas Score: 10 (10/7/2019 10:45 AM)         3-4 drinks

## 2019-10-07 NOTE — TRANSFER OF CARE
"Anesthesia Transfer of Care Note    Patient: Sonido Loera    Procedure(s) Performed: Procedure(s) (LRB):  ROBOTIC REPAIR, HERNIA, INCISIONAL (N/A)    Patient location: PACU    Anesthesia Type: general    Transport from OR: Transported from OR on 2-3 L/min O2 by NC with adequate spontaneous ventilation    Post pain: adequate analgesia    Post assessment: no apparent anesthetic complications and tolerated procedure well    Post vital signs: stable    Level of consciousness: sedated    Nausea/Vomiting: no nausea/vomiting    Complications: none    Transfer of care protocol was followed      Last vitals:   Visit Vitals  /69 (BP Location: Right arm, Patient Position: Lying)   Pulse 69   Temp 36.5 °C (97.7 °F) (Skin)   Resp 18   Ht 5' 8" (1.727 m)   Wt 79.8 kg (176 lb)   LMP 07/16/2011   SpO2 99%   Breastfeeding? No   BMI 26.76 kg/m²     "

## 2019-10-07 NOTE — DISCHARGE INSTRUCTIONS
"Discharge Instructions: After Your Surgery/Procedure  Youve just had surgery. During surgery you were given medicine called anesthesia to keep you relaxed and free of pain. After surgery you may have some pain or nausea. This is common. Here are some tips for feeling better and getting well after surgery.     Stay on schedule with your medication.   Going home  Your doctor or nurse will show you how to take care of yourself when you go home. He or she will also answer your questions. Have an adult family member or friend drive you home.      For your safety we recommend these precaution for the first 24 hours after your procedure:  · Do not drive or use heavy equipment.  · Do not make important decisions or sign legal papers.  · Do not drink alcohol.  · Have someone stay with you, if needed. He or she can watch for problems and help keep you safe.  · Your concentration, balance, coordination, and judgement may be impaired for many hours after anesthesia.  Use caution when ambulating or standing up.     · You may feel weak and "washed out" after anesthesia and surgery.      Subtle residual effects of general anesthesia or sedation with regional / local anesthesia can last more than 24 hours.  Rest for the remainder of the day or longer if your Doctor/Surgeon has advised you to do so.  Although you may feel normal within the first 24 hours, your reflexes and mental ability may be impaired without you realizing it.  You may feel dizzy, lightheaded or sleepy for 24 hours or longer.      Be sure to go to all follow-up visits with your doctor. And rest after your surgery for as long as your doctor tells you to.  Coping with pain  If you have pain after surgery, pain medicine will help you feel better. Take it as told, before pain becomes severe. Also, ask your doctor or pharmacist about other ways to control pain. This might be with heat, ice, or relaxation. And follow any other instructions your surgeon or nurse gives " you.    Tips for taking pain medicine  To get the best relief possible, remember these points:  · Pain medicines can upset your stomach. Taking them with a little food may help.  · Most pain relievers taken by mouth need at least 20 to 30 minutes to start to work.  · Taking medicine on a schedule can help you remember to take it. Try to time your medicine so that you can take it before starting an activity. This might be before you get dressed, go for a walk, or sit down for dinner.  · Constipation is a common side effect of pain medicines. Call your doctor before taking any medicines such as laxatives or stool softeners to help ease constipation. Also ask if you should skip any foods. Drinking lots of fluids and eating foods such as fruits and vegetables that are high in fiber can also help. Remember, do not take laxatives unless your surgeon has prescribed them.  · Drinking alcohol and taking pain medicine can cause dizziness and slow your breathing. It can even be deadly. Do not drink alcohol while taking pain medicine.  · Pain medicine can make you react more slowly to things. Do not drive or run machinery while taking pain medicine.  Your health care provider may tell you to take acetaminophen to help ease your pain. Ask him or her how much you are supposed to take each day. Acetaminophen or other pain relievers may interact with your prescription medicines or other over-the-counter (OTC) drugs. Some prescription medicines have acetaminophen and other ingredients. Using both prescription and OTC acetaminophen for pain can cause you to overdose. Read the labels on your OTC medicines with care. This will help you to clearly know the list of ingredients, how much to take, and any warnings. It may also help you not take too much acetaminophen. If you have questions or do not understand the information, ask your pharmacist or health care provider to explain it to you before you take the OTC medicine.    Managing  nausea  Some people have an upset stomach after surgery. This is often because of anesthesia, pain, or pain medicine, or the stress of surgery. These tips will help you handle nausea and eat healthy foods as you get better. If you were on a special food plan before surgery, ask your doctor if you should follow it while you get better. These tips may help:  · Do not push yourself to eat. Your body will tell you when to eat and how much.  · Start off with clear liquids and soup. They are easier to digest.  · Next try semi-solid foods, such as mashed potatoes, applesauce, and gelatin, as you feel ready.  · Slowly move to solid foods. Dont eat fatty, rich, or spicy foods at first.  · Do not force yourself to have 3 large meals a day. Instead eat smaller amounts more often.  · Take pain medicines with a small amount of solid food, such as crackers or toast, to avoid nausea.     Call your surgeon if  · You still have pain an hour after taking medicine. The medicine may not be strong enough.  · You feel too sleepy, dizzy, or groggy. The medicine may be too strong.  · You have side effects like nausea, vomiting, or skin changes, such as rash, itching, or hives.       If you have obstructive sleep apnea  You were given anesthesia medicine during surgery to keep you comfortable and free of pain. After surgery, you may have more apnea spells because of this medicine and other medicines you were given. The spells may last longer than usual.   At home:  · Keep using the continuous positive airway pressure (CPAP) device when you sleep. Unless your health care provider tells you not to, use it when you sleep, day or night. CPAP is a common device used to treat obstructive sleep apnea.  · Talk with your provider before taking any pain medicine, muscle relaxants, or sedatives. Your provider will tell you about the possible dangers of taking these medicines.  © 6793-1681 [employer]. [address] [place] [address]  [place] [address]. All rights reserved. This information is not intended as a substitute for professional medical care. Always follow your healthcare professional's instructions.      Post op instructions for prevention of DVT  What is deep vein thrombosis?  Deep vein thrombosis (DVT) is the medical term for blood clots in the deep veins of the leg.  These blood clots can be dangerous.  A DVT can block a blood vessel and keep blood from getting where it needs to go.  Another problem is that the clot can travel to other parts of the body such as the lungs.  A clot that travels to the lungs is called a pulmonary embolus (PE) and can cause serious problems with breathing which can lead to death.    Am I at risk for DVT/PE?  If you are not very active, you are at risk of DVT.  Anyone confined to bed, sitting for long periods of time, recovering from surgery, etc. increases the risk of DVT.  Other risk factors are cancer diagnosis, certain medications, estrogen replacement in any form,older age, obesity, pregnancy, smoking, history of clotting disorders, and dehydration.    How will I know if I have a DVT?   Swelling in the lower leg   Pain   Warmth, redness, hardness or bulging of the vein  If you have any of these symptoms, call your doctors office right away.  Some people will not have any symptoms until the clot moves to the lungs.    What are the symptoms of a PE?   Panting, shortness of breath, or trouble breathing   Sharp, knife-like chest pain when you breathe   Coughing or coughing up blood   Rapid heartbeat  If you have any of these symptoms or get worse quickly, call 911 for emergency treatment.    How can I prevent a DVT?   Avoid long periods of inactivity and dont cross your legs--get up and walk around every hour or so.   Stay active--walking after surgery is highly encouraged.  This means you should get out of the house and walk in the neighborhood.  Going up and down stairs will not impair healing  (unless advised against such activity by your doctor).     Drink plenty of noncaffeinated, nonalcoholic fluids each day to prevent dehydration.   Wear special support stockings, if they have been advised by your doctor.   If you travel, stop at least once an hour and walk around.   Avoid smoking (assistance with stopping is available through your healthcare provider)  Always notify your doctor if you are not able to follow the post operative instructions that are given to you at the time of discharge.  It may be necessary to prescribe one of the medications available to prevent DVT.      After Laparoscopic Hernia Repair  You had a procedure called laparoscopic hernia repair. A hernia is a defect in the tough tissue covering the musculature of the abdominal wall (fascia). During laparoscopic hernia surgery, a surgeon inserts a telescope attached to a camera as well as surgical instruments through tiny incisions in your abdomen. The surgeon repairs the hernia with a mesh, which patches the tear or weakness in the fascia.  Home care  · Note that your shoulder may feel tight or your neck may be stiff for 24 to 48 hours after your surgery. This is common and usually lasts a short time. You may also have numbness around the incision area.  · Keep doing the coughing and deep breathing exercises that you learned in the hospital. These will help to prevent lung infection.  · Prevent constipation so you dont strain when going to the bathroom. Eat fruits, vegetables, and whole grains. Drink 6 to 8 glasses of water a day, unless otherwise directed. Use a laxative or a mild stool softener if your healthcare provider says its OK.  · Wash your incision with mild soap and water. Pat it dry. Dont use oil, powder, or lotion on your incision.  · Shower or take baths as instructed by your healthcare provider. Instructions will vary based on how your incision was closed and how its healing. It may be closed with glue, sutures,  or staples. Your healthcare provider may have different advice for each kind.  Activity  · Ask others to help with chores and errands while you recover.  · Dont lift anything heavier than 10 pounds until your healthcare provider says its OK.  · Dont mow the lawn, use a vacuum , or do other strenuous activities until your healthcare provider says it's OK.  · Climb stairs slowly and pause after every few steps.  · Walk as often as you feel able.  · Ask your healthcare provider when you can drive again. This may be when you stop taking pain medicine and can move comfortably from side to side. Dont drive if you are still taking opioid pain medicine.  When to call your healthcare provider   Call your healthcare provider right away if you have any of the following:  · Pain, bleeding, redness, or fluid at the incision site that gets worse  · Fever of 100.4°F (38°C) or higher, or as directed by your healthcare provider  · Vomiting or nausea that doesnt go away  · Inability to urinate  · No bowel movement after 3 days  · Swelling in abdomen or groin that gets worse  · Pain thats not relieved by medicine   Date Last Reviewed: 10/1/2016  © 7677-7506 [employer]. [address] [place] [address], [place] [address]. All rights reserved. This information is not intended as a substitute for professional medical care. Always follow your healthcare professional's instructions.    Discharge Instructions: After Your Surgery/Procedure  Youve just had surgery. During surgery you were given medicine called anesthesia to keep you relaxed and free of pain. After surgery you may have some pain or nausea. This is common. Here are some tips for feeling better and getting well after surgery.     Stay on schedule with your medication.   Going home  Your doctor or nurse will show you how to take care of yourself when you go home. He or she will also answer your questions. Have an adult family member or friend drive you home.  "     For your safety we recommend these precaution for the first 24 hours after your procedure:  · Do not drive or use heavy equipment.  · Do not make important decisions or sign legal papers.  · Do not drink alcohol.  · Have someone stay with you, if needed. He or she can watch for problems and help keep you safe.  · Your concentration, balance, coordination, and judgement may be impaired for many hours after anesthesia.  Use caution when ambulating or standing up.     · You may feel weak and "washed out" after anesthesia and surgery.      Subtle residual effects of general anesthesia or sedation with regional / local anesthesia can last more than 24 hours.  Rest for the remainder of the day or longer if your Doctor/Surgeon has advised you to do so.  Although you may feel normal within the first 24 hours, your reflexes and mental ability may be impaired without you realizing it.  You may feel dizzy, lightheaded or sleepy for 24 hours or longer.      Be sure to go to all follow-up visits with your doctor. And rest after your surgery for as long as your doctor tells you to.    Coping with pain  If you have pain after surgery, pain medicine will help you feel better. Take it as told, before pain becomes severe. Also, ask your doctor or pharmacist about other ways to control pain. This might be with heat, ice, or relaxation. And follow any other instructions your surgeon or nurse gives you.    Tips for taking pain medicine  To get the best relief possible, remember these points:  · Pain medicines can upset your stomach. Taking them with a little food may help.  · Most pain relievers taken by mouth need at least 20 to 30 minutes to start to work.  · Taking medicine on a schedule can help you remember to take it. Try to time your medicine so that you can take it before starting an activity. This might be before you get dressed, go for a walk, or sit down for dinner.  · Constipation is a common side effect of pain " medicines. Call your doctor before taking any medicines such as laxatives or stool softeners to help ease constipation. Also ask if you should skip any foods. Drinking lots of fluids and eating foods such as fruits and vegetables that are high in fiber can also help. Remember, do not take laxatives unless your surgeon has prescribed them.  · Drinking alcohol and taking pain medicine can cause dizziness and slow your breathing. It can even be deadly. Do not drink alcohol while taking pain medicine.  · Pain medicine can make you react more slowly to things. Do not drive or run machinery while taking pain medicine.  Your health care provider may tell you to take acetaminophen to help ease your pain. Ask him or her how much you are supposed to take each day. Acetaminophen or other pain relievers may interact with your prescription medicines or other over-the-counter (OTC) drugs. Some prescription medicines have acetaminophen and other ingredients. Using both prescription and OTC acetaminophen for pain can cause you to overdose. Read the labels on your OTC medicines with care. This will help you to clearly know the list of ingredients, how much to take, and any warnings. It may also help you not take too much acetaminophen.         We hope your stay was comfortable as you heal now, mend and rest.    For we have enjoyed taking care of you by giving your our best.    And as you get better, by regaining your health and strength;   We count it as a privilege to have served you and hope your time at [facility] was well spent.      Thank  You!!!

## 2019-10-07 NOTE — H&P
HPI   Pleasant 52 yo F referred to me in consultation from Dr Lawrence for evaluation of abdominal hernia. Pt notes that she has felt hernia for over a year.  She notes that it seems to be getting larger and has been bothering her more lately.  States that she is frequently having to push it back in .  She denies n/v.  No fever/chills.  Pt notes that she is otherwise healthy.  Pt PShx is significant for lap sergio and csection.  She does not smoke  Review of Systems   Constitutional: Negative for activity change, appetite change, fever and unexpected weight change.   HENT: Negative for congestion and hearing loss.    Respiratory: Negative for chest tightness, shortness of breath and wheezing.    Cardiovascular: Negative for chest pain.   Gastrointestinal: Positive for abdominal pain. Negative for abdominal distention, anal bleeding, blood in stool, constipation, diarrhea, nausea, rectal pain and vomiting.   Genitourinary: Negative for difficulty urinating, dysuria and frequency.   Skin: Negative for color change and wound.   Neurological: Negative for dizziness.   Hematological: Negative for adenopathy. Does not bruise/bleed easily.   Psychiatric/Behavioral: Negative for agitation and decreased concentration.       Objective:   Physical Exam   Constitutional: She is oriented to person, place, and time. She appears well-developed and well-nourished.   HENT:   Head: Normocephalic and atraumatic.   Eyes: Pupils are equal, round, and reactive to light.   Neck: Normal range of motion. Neck supple. No tracheal deviation present. No thyromegaly present.   Cardiovascular: Normal rate, regular rhythm and normal heart sounds.   No murmur heard.  Pulmonary/Chest: Effort normal and breath sounds normal. She exhibits no tenderness.   Abdominal: Soft. Bowel sounds are normal. She exhibits no distension, no abdominal bruit, no pulsatile midline mass and no mass. There is no hepatosplenomegaly. There is no tenderness. There is no  rigidity, no rebound, no guarding, no tenderness at McBurney's point and negative Cruz's sign. No hernia. Hernia confirmed negative in the ventral area.       Genitourinary: Rectum normal.   Musculoskeletal: Normal range of motion.   Lymphadenopathy:     She has no axillary adenopathy.        Right: No inguinal and no supraclavicular adenopathy present.        Left: No inguinal and no supraclavicular adenopathy present.   No palpable inguinal lymphadenopathy   Neurological: She is alert and oriented to person, place, and time.   Skin: Skin is warm. No rash noted. No erythema.   Psychiatric: She has a normal mood and affect.   Vitals reviewed.         CT scan 7/19     Ventral hernia fat containing, small.  Small umbilical hernia  Pelvic lymph nodes: There are a few mildly prominent inguinal lymph nodes.  One index lymph node in the right inguinal region measures 10 mm in short axis on series 2, image 141.  No pathologically enlarged iliac chain or pelvic sidewall lymph nodes.  Assessment:     incisional hernia  Umbilical hernia  No diagnosis found.    Plan:       D/w pt.  First regarding the lymphadenopathy.  THis is likely physiologic.  Node minimally enlarged.  No palpable nodes.  Regarding the hernia given increasing discomfort I have recommended operative intervention at a time that is convenient to the pt.  Recommend robotic approach, would fix incisional as well as Umbilical.  Rissk and benefits were d/w pt.  Informed consent obtained.  Pt will call to schedule.

## 2019-10-07 NOTE — PROGRESS NOTES
Patient reports pain tolerable.  Tolerated po fluids.  Spouse at bedside assisting her to dress then will be discharged.

## 2019-10-07 NOTE — PLAN OF CARE
Voided.  Vs stable.  aao x 4.  Pain tolerable.  No c/o nausea voiced.  Discharge teaching completed per Zena Romano RN.  Will discharge once patient is dressed.

## 2019-10-07 NOTE — ANESTHESIA PREPROCEDURE EVALUATION
10/07/2019  Sonido Loera is a 54 y.o., female.    Anesthesia Evaluation    I have reviewed the Patient Summary Reports.    I have reviewed the Nursing Notes.      Review of Systems  Anesthesia Hx:  No problems with previous Anesthesia    Hepatic/GI:   GERD, well controlled        Physical Exam  General:  Well nourished    Airway/Jaw/Neck:  Airway Findings: Mallampati: II                Anesthesia Plan  Type of Anesthesia, risks & benefits discussed:  Anesthesia Type:  general  Patient's Preference:   Intra-op Monitoring Plan:   Intra-op Monitoring Plan Comments:   Post Op Pain Control Plan:   Post Op Pain Control Plan Comments:   Induction:   IV  Beta Blocker:  Patient is not currently on a Beta-Blocker (No further documentation required).       Informed Consent: Patient understands risks and agrees with Anesthesia plan.  Questions answered. Anesthesia consent signed with patient.  ASA Score: 2     Day of Surgery Review of History & Physical:    H&P update referred to the surgeon.         Ready For Surgery From Anesthesia Perspective.

## 2019-10-07 NOTE — OP NOTE
DATE OF PROCEDURE:  10/07/2019    STAFF SURGEON:  Alexis Gonzalez M.D.    Assistant: Kathleen GUAMAN     PREOPERATIVE DIAGNOSIS:  Ventral hernia.    POSTOPERATIVE DIAGNOSIS:  Ventral hernia and incisional hernia.    ANESTHESIA:  General endotracheal anesthesia.    PROCEDURE:  Robotic repair of ventral hernia x2.    INDICATION FOR PROCEDURE:  A pleasant 54-year-old female who presented to my   office with a small ventral hernia that was also where the umbilical hernia that   was seen on the CT scan.  She was having some pain from the ventral hernia,   scheduled for surgical repair.    DESCRIPTION OF PROCEDURE:  Following signing of informed consent, she received   preoperative antibiotics, taken to the OR and placed in supine position.  SCDs   were placed.  General endotracheal anesthesia was administered without event.    Handy catheter inserted.  Abdomen prepped and draped in standard fashion and   appropriate timeout procedure performed.  I then made a small vertical incision   in the lower midline after injection of 0.25% Marcaine mixed with Exparel.  I   entered the abdominal cavity with a Veress needle establishing pneumoperitoneum   to 50 mmHg.  I entered the abdominal cavity with an Optiview trocar under direct   visualization.  Having done this, I evaluated for injury from the Veress   needle.  No such injury was appreciated.  I placed an 8 mm robotic trocar in the   left lower quadrant and docked the robot.  Having docked the robot, I took down   omental adhesions to the anterior abdominal wall.  I identified a very small   umbilical hernia, which was reduced uneventfully and a few centimeters above   this in the ventral abdomen, there was another small defect.  I closed both   these defects individually with a V-Loc suture.  Having closed the defect, I   then took an 11 cm round Ventralight mesh with a [employer] Echo deploying system   introduced into the abdominal cavity, secured to the anterior abdominal  wall and   secured the mesh to the wall with interrupted Stratafix sutures.  Having   secured the mesh to the wall, I removed the deployment system, removed all   sutures.  We then removed all trocars under direct visualization, ensuring   adequate fixation.  I injected Marcaine mixed with Exparel.  Having removed all   trocars, I then closed the fascia in the lower midline with 0 Vicryl suture.    Skin incision was closed with 4-0 Monocryl.  The patient was then extubated,   taken to Recovery Room in stable condition.  There were no immediate   complications.  Blood loss was 10 mL.        OSMANY  dd: 10/07/2019 09:26:56 (CDT)  td: 10/07/2019 10:45:15 (CDT)  Doc ID   #[id number]  Job ID #[id number]    CC:

## 2019-10-07 NOTE — BRIEF OP NOTE
[facility]  Brief Operative Note     SUMMARY     Surgery Date: 10/7/2019     Surgeon(s) and Role:     * Alexis Gonzalez MD - Primary    Assisting Surgeon: None    Pre-op Diagnosis:  Incisional hernia, without obstruction or gangrene [K43.2]    Post-op Diagnosis:  Post-Op Diagnosis Codes:     * Incisional hernia, without obstruction or gangrene [K43.2]    Procedure(s) (LRB):  ROBOTIC REPAIR, HERNIA, INCISIONAL (N/A)    Anesthesia: General    Description of the findings of the procedure: Small ventral hernia in upper midline.  Incisional hernia at umbilicus.  Both defects closed an d covered with an 11 cm round ventralight mesh.     Findings/Key Components: see above    Estimated Blood Loss: 10 mL         Specimens:   Specimen (12h ago, onward)    None          Discharge Note    SUMMARY     Admit Date: 10/7/2019    Discharge Date and Time:  10/07/2019 9:23 AM    Hospital Course (synopsis of major diagnoses, care, treatment, and services provided during the course of the hospital stay): Pt presented for hernia repair.  Procedure and post op course were without event and pt was discharged to home.         Final Diagnosis: Post-Op Diagnosis Codes:     * Incisional hernia, without obstruction or gangrene [K43.2]    Disposition: Home or Self Care    Follow Up/Patient Instructions:     Medications:  Reconciled Home Medications:      Medication List      START taking these medications    HYDROcodone-acetaminophen 5-325 mg per tablet  Commonly known as:  NORCO  Take 1 tablet by mouth every 6 (six) hours as needed for Pain.        ASK your doctor about these medications    ascorbic acid (vitamin C) 500 MG tablet  Commonly known as:  VITAMIN C  Take 500 mg by mouth once daily.     b complex vitamins capsule  Take 1 capsule by mouth once daily.     cetirizine 10 MG tablet  Commonly known as:  ZYRTEC  Take 10 mg by mouth once daily.     loratadine 10 mg tablet  Commonly known as:  CLARITIN  Take 10 mg by  mouth once daily.     naltrexone capsule  Take 1 capsule (4.5 mg total) by mouth every evening.     naproxen sodium 220 MG tablet  Commonly known as:  ANAPROX  Take 220 mg by mouth every 12 (twelve) hours.     NON FORMULARY MEDICATION  Take 1 tablet by mouth once daily.     ONE DAILY MULTIVITAMIN per tablet  Generic drug:  multivitamin  Take 1 tablet by mouth once daily.          Discharge Procedure Orders   Diet general     Lifting restrictions     Call MD for:  extreme fatigue     Call MD for:  persistent dizziness or light-headedness     Call MD for:  hives     Call MD for:  redness, tenderness, or signs of infection (pain, swelling, redness, odor or green/yellow discharge around incision site)     Call MD for:  difficulty breathing, headache or visual disturbances     Call MD for:  severe uncontrolled pain     Call MD for:  temperature >100.4     Call MD for:  persistent nausea and vomiting     Remove dressing in 48 hours     Follow-up Information     Alexis Gonzalez MD In 2 weeks.    Specialties:  General Surgery, Colon and Rectal Surgery, Surgery  Contact information:  [address] [place] [address]  [phone]

## 2019-10-21 ENCOUNTER — OFFICE VISIT (OUTPATIENT)
Dept: ENDOCRINOLOGY | Facility: CLINIC | Age: 54
End: 2019-10-21
Payer: OTHER GOVERNMENT

## 2019-10-21 DIAGNOSIS — E04.1 THYROID NODULE: Primary | ICD-10-CM

## 2019-10-21 PROCEDURE — 99212 OFFICE O/P EST SF 10 MIN: CPT | Mod: PBBFAC,PO,25 | Performed by: INTERNAL MEDICINE

## 2019-10-21 PROCEDURE — 99999 PR PBB SHADOW E&M-EST. PATIENT-LVL II: CPT | Mod: PBBFAC,,, | Performed by: INTERNAL MEDICINE

## 2019-10-21 PROCEDURE — 99499 UNLISTED E&M SERVICE: CPT | Mod: S$PBB,,, | Performed by: INTERNAL MEDICINE

## 2019-10-21 PROCEDURE — 10005 FNA BX W/US GDN 1ST LES: CPT | Mod: PBBFAC,PO | Performed by: INTERNAL MEDICINE

## 2019-10-21 PROCEDURE — 88173 CYTOPATH EVAL FNA REPORT: CPT | Performed by: PATHOLOGY

## 2019-10-21 PROCEDURE — 99499 NO LOS: ICD-10-PCS | Mod: S$PBB,,, | Performed by: INTERNAL MEDICINE

## 2019-10-21 PROCEDURE — 10005 PR FINE NEEDLE ASP BIOPSY, W/US GUIDANCE, 1ST LESION: ICD-10-PCS | Mod: S$PBB,,, | Performed by: INTERNAL MEDICINE

## 2019-10-21 PROCEDURE — 88173 CYTOLOGY SPECIMEN-FNA NON-RADIOLOGY CLINICIAN PERFORMED W/O ON SITE: ICD-10-PCS | Mod: 26,,, | Performed by: PATHOLOGY

## 2019-10-21 PROCEDURE — 99999 PR PBB SHADOW E&M-EST. PATIENT-LVL II: ICD-10-PCS | Mod: PBBFAC,,, | Performed by: INTERNAL MEDICINE

## 2019-10-21 PROCEDURE — 10005 FNA BX W/US GDN 1ST LES: CPT | Mod: S$PBB,,, | Performed by: INTERNAL MEDICINE

## 2019-10-21 NOTE — PROGRESS NOTES
Thyroid ultrasound for FNA    Indication:  Ultrasound guidance for fine needle aspiration biopsy left thyroid FNA  Procedure time out noted. Patient's name, , and location of biopsy were verified with patient. Discussed risks of procedure and risks of a non diagnostic biopsy    Real time ultrasound was performed in 2 planes using a Rodger ultrasound machine and 12 MHz probe.   The left nodule was identified and described in report.  Informed consent obtained and questions answered. FNA guidance was performed using direct u/s guidance to confirm accurate needle placement.  5 aspirations were made using 25 gauge needles.  Samples were submitted for cytology.  The patient tolerated the procedure well without complication.  After care instructions were provided.        Impression:  Uncomplicated FNA biopsy of left thyroid nodule under U/S guidance. 1 sample saved for molecular markers

## 2019-10-23 ENCOUNTER — OFFICE VISIT (OUTPATIENT)
Dept: SURGERY | Facility: CLINIC | Age: 54
End: 2019-10-23
Payer: OTHER GOVERNMENT

## 2019-10-23 VITALS — HEIGHT: 68 IN | WEIGHT: 174.38 LBS | BODY MASS INDEX: 26.43 KG/M2 | TEMPERATURE: 99 F

## 2019-10-23 DIAGNOSIS — Z09 POSTOP CHECK: Primary | ICD-10-CM

## 2019-10-23 PROBLEM — K43.2 INCISIONAL HERNIA, WITHOUT OBSTRUCTION OR GANGRENE: Status: RESOLVED | Noted: 2019-10-07 | Resolved: 2019-10-23

## 2019-10-23 PROCEDURE — 99999 PR PBB SHADOW E&M-EST. PATIENT-LVL III: ICD-10-PCS | Mod: PBBFAC,,, | Performed by: SURGERY

## 2019-10-23 PROCEDURE — 99999 PR PBB SHADOW E&M-EST. PATIENT-LVL III: CPT | Mod: PBBFAC,,, | Performed by: SURGERY

## 2019-10-23 PROCEDURE — 99024 POSTOP FOLLOW-UP VISIT: CPT | Mod: ,,, | Performed by: SURGERY

## 2019-10-23 PROCEDURE — 99213 OFFICE O/P EST LOW 20 MIN: CPT | Mod: PBBFAC,PO | Performed by: SURGERY

## 2019-10-23 PROCEDURE — 99024 PR POST-OP FOLLOW-UP VISIT: ICD-10-PCS | Mod: ,,, | Performed by: SURGERY

## 2019-10-23 NOTE — PROGRESS NOTES
Cc: post op    HPI: 54 y.o.  female  2 weeks s/p robotic incisional hernia.   Pt notes that she is feeling well.  No pain or discomfort.      PE: AFVSS    AAOx3  CTA  Soft/NT/nd  Inc: c/d/i       A/P:   Pt doing well post surgery.   F/U with me prn   No heavy lifting for 6 weeks

## 2019-10-24 ENCOUNTER — TELEPHONE (OUTPATIENT)
Dept: ENDOCRINOLOGY | Facility: CLINIC | Age: 54
End: 2019-10-24

## 2019-10-24 DIAGNOSIS — E04.1 THYROID NODULE: Primary | ICD-10-CM

## 2019-11-22 ENCOUNTER — OFFICE VISIT (OUTPATIENT)
Dept: RHEUMATOLOGY | Facility: CLINIC | Age: 54
End: 2019-11-22
Payer: OTHER GOVERNMENT

## 2019-11-22 VITALS
BODY MASS INDEX: 25.73 KG/M2 | DIASTOLIC BLOOD PRESSURE: 73 MMHG | SYSTOLIC BLOOD PRESSURE: 115 MMHG | WEIGHT: 169.75 LBS | HEART RATE: 79 BPM | HEIGHT: 68 IN

## 2019-11-22 DIAGNOSIS — M79.7 FIBROMYALGIA: Primary | ICD-10-CM

## 2019-11-22 DIAGNOSIS — M15.9 PRIMARY OSTEOARTHRITIS INVOLVING MULTIPLE JOINTS: ICD-10-CM

## 2019-11-22 DIAGNOSIS — M54.81 OCCIPITAL NEURALGIA, UNSPECIFIED LATERALITY: ICD-10-CM

## 2019-11-22 DIAGNOSIS — R51.9 NONINTRACTABLE EPISODIC HEADACHE, UNSPECIFIED HEADACHE TYPE: ICD-10-CM

## 2019-11-22 PROCEDURE — 99999 PR PBB SHADOW E&M-EST. PATIENT-LVL III: ICD-10-PCS | Mod: PBBFAC,,, | Performed by: INTERNAL MEDICINE

## 2019-11-22 PROCEDURE — 99214 PR OFFICE/OUTPT VISIT, EST, LEVL IV, 30-39 MIN: ICD-10-PCS | Mod: S$PBB,,, | Performed by: INTERNAL MEDICINE

## 2019-11-22 PROCEDURE — 99213 OFFICE O/P EST LOW 20 MIN: CPT | Mod: PBBFAC,PO | Performed by: INTERNAL MEDICINE

## 2019-11-22 PROCEDURE — 99999 PR PBB SHADOW E&M-EST. PATIENT-LVL III: CPT | Mod: PBBFAC,,, | Performed by: INTERNAL MEDICINE

## 2019-11-22 PROCEDURE — 99214 OFFICE O/P EST MOD 30 MIN: CPT | Mod: S$PBB,,, | Performed by: INTERNAL MEDICINE

## 2019-11-22 NOTE — PROGRESS NOTES
Subjective:          Chief Complaint: Sonido Loera is a 54 y.o. female who had concerns including Disease Management; Fibromyalgia; and Osteoarthritis.    HPI:    Patient is a 52 y/o female with aches and pains for approximately 3 years in joints and long bones. INtermittent. 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 Present for 22 years. Did have cholecystectomy years ago but did not resolve the issue. Twice/year she has severe pain.   Exacerbated with loss of sleep over a period of time.      We trialed gabapentin low dose, but she developed dizziness/vertigo. She has hx of vertigo type migraine, with sleep deprivation and gabapentin seemed to trigger this.   Right 2nd DIP with swelling and pain.     REVIEW OF SYSTEMS:    Review of Systems   Constitutional: Positive for malaise/fatigue. Negative for fever and weight loss.   HENT: Negative for sore throat.    Eyes: Negative for double vision, photophobia and redness.   Respiratory: Negative for cough, shortness of breath and wheezing.    Cardiovascular: Negative for chest pain, palpitations and orthopnea.   Gastrointestinal: Negative for abdominal pain, constipation and diarrhea.   Genitourinary: Negative for dysuria, hematuria and urgency.   Musculoskeletal: Positive for joint pain and myalgias. Negative for back pain.   Skin: Negative for rash.   Neurological: Negative for dizziness, tingling, focal weakness and headaches.   Endo/Heme/Allergies: Does not bruise/bleed easily.   Psychiatric/Behavioral: Negative for depression, hallucinations and suicidal ideas. The patient has insomnia.                Objective:            Past Medical History:   Diagnosis Date    Allergic rhinitis due to pollen     Breast cancer 2009    left lumpectomy    Colon polyp     Encounter for blood transfusion     after miscarriage     Fibromyalgia     Hernia of abdominal wall     Joint pain     Liver cyst     Thyroid nodule      nodule    Umbilical hernia      Family History   Problem Relation Age of Onset    Rhinitis Daughter     Breast cancer Mother 82    Breast cancer Maternal Aunt 78        mother's twin    Liver cancer Paternal Aunt     Cirrhosis Maternal Grandmother     Heart disease Father     Chronic infections Sister     No Known Problems Brother     AVM Sister     Urticaria Neg Hx     Immunodeficiency Neg Hx     Eczema Neg Hx     Asthma Neg Hx     Angioedema Neg Hx     Colon cancer Neg Hx     Crohn's disease Neg Hx     Ulcerative colitis Neg Hx     Esophageal cancer Neg Hx      Social History     Tobacco Use    Smoking status: Former Smoker     Packs/day: 1.00     Years: 3.00     Pack years: 3.00     Types: Cigarettes     Last attempt to quit: 1983     Years since quittin.8    Smokeless tobacco: Never Used   Substance Use Topics    Alcohol use: Yes     Alcohol/week: 1.0 standard drinks     Types: 1 Glasses of wine per week     Frequency: Monthly or less     Drinks per session: 1 or 2     Binge frequency: Never     Comment: 1-2 times a year    Drug use: Never         Current Outpatient Medications on File Prior to Visit   Medication Sig Dispense Refill    ascorbic acid (VITAMIN C) 500 MG tablet Take 500 mg by mouth once daily.      b complex vitamins capsule Take 1 capsule by mouth once daily.      multivitamin (ONE DAILY MULTIVITAMIN) per tablet Take 1 tablet by mouth once daily.      naltrexone capsule Take 1 capsule (4.5 mg total) by mouth every evening. 30 capsule 6    naproxen sodium (ANAPROX) 220 MG tablet Take 220 mg by mouth every 12 (twelve) hours.      cetirizine (ZYRTEC) 10 MG tablet Take 10 mg by mouth once daily.      HYDROcodone-acetaminophen (NORCO) 5-325 mg per tablet Take 1 tablet by mouth every 6 (six) hours as needed for Pain. (Patient not taking: Reported on 10/23/2019) 20 tablet 0    loratadine (CLARITIN) 10 mg tablet Take 10 mg by mouth once daily.      NON  FORMULARY MEDICATION Take 1 tablet by mouth once daily.        No current facility-administered medications on file prior to visit.        Vitals:    11/22/19 1304   BP: 115/73   Pulse: 79       Physical Exam:    Physical Exam   Constitutional: She is oriented to person, place, and time. She appears well-developed and well-nourished.   HENT:   Head: Normocephalic and atraumatic.   Mouth/Throat: Oropharynx is clear and moist.   Eyes: Pupils are equal, round, and reactive to light. EOM are normal.   Neck: Normal range of motion.   Cardiovascular: Normal rate, regular rhythm and normal heart sounds.   Pulmonary/Chest: Effort normal and breath sounds normal.   Musculoskeletal:        Right shoulder: She exhibits normal range of motion, no tenderness and no swelling.        Left shoulder: She exhibits normal range of motion, no tenderness and no swelling.        Right elbow: She exhibits normal range of motion and no swelling. No tenderness found.        Left elbow: She exhibits normal range of motion and no swelling. No tenderness found.        Right wrist: She exhibits normal range of motion, no tenderness and no swelling.        Left wrist: She exhibits normal range of motion, no tenderness and no swelling.        Right knee: She exhibits normal range of motion and no swelling. No tenderness found.        Left knee: She exhibits normal range of motion and no swelling. No tenderness found.        Right hand: She exhibits normal range of motion, no tenderness and no swelling.        Left hand: She exhibits normal range of motion, no tenderness and no swelling.        Right foot: There is normal range of motion, no tenderness and no swelling.        Left foot: There is normal range of motion, no tenderness and no swelling.   18 tender points examined in nine pairs: Posterior occiput, bilateral trapezius, bilateral supraspinatus, bilateral gluteal muscles, bilateral low cervical neck, bilateral second rib, bilateral  lateral epicondyles, bilateral greater trochanters, bilateral medial knees. 6 Of 18 points examined were positive for tenderness.     Neurological: She is alert and oriented to person, place, and time.   Skin: Skin is warm and dry.   Psychiatric: She has a normal mood and affect. Her behavior is normal.             Assessment:       Encounter Diagnoses   Name Primary?    Fibromyalgia Yes    Primary osteoarthritis involving multiple joints     Occipital neuralgia, unspecified laterality     Nonintractable episodic headache, unspecified headache type           Plan:        Fibromyalgia    Primary osteoarthritis involving multiple joints    Occipital neuralgia, unspecified laterality  -     Ambulatory consult to Neurology    Nonintractable episodic headache, unspecified headache type  -     Ambulatory consult to Neurology    Other orders  -     naltrexone capsule; Take 1 capsule (4.5 mg total) by mouth every evening.  Dispense: 30 capsule; Refill: 6      Very pleasant 52 y/o female with myalgia and polyarthralgia triggered with sleep deprivation. Does not have the typic tender points of FMS but highly suspect myofascial pain       OA hands-NSAIDs PRN> reviewed natural progression of OA hands and changes of the DIP joints.    Spent time discussing FMS with patient and multidisciplinary approach to therapy with pharmacologic, psychologic, lifestyle modification and in particular low impact short interval exercise such as walking, latonia chi, yoga and swimming. Discussed the importance of sleep. Discussed exercise modalities.    -continue low dose naltrexone 4.5mg daily compounded.    She had benefit with MSK complaints on Topiramate but noted significant rapid weight gain     Follow up in about 6 months (around 5/22/2020).        30min consultation with greater than 50% spent in counseling, chart review and coordination of care. All questions answered.  Thank you for allowing me to participate in the care of this very  pleasant patient.

## 2019-11-26 ENCOUNTER — TELEPHONE (OUTPATIENT)
Dept: NEUROLOGY | Facility: CLINIC | Age: 54
End: 2019-11-26

## 2019-11-26 NOTE — TELEPHONE ENCOUNTER
Called patient and scheduled appointment in regards to referral. Appointment scheduled and patient expressed understanding.

## 2019-12-09 ENCOUNTER — OFFICE VISIT (OUTPATIENT)
Dept: URGENT CARE | Facility: CLINIC | Age: 54
End: 2019-12-09
Payer: OTHER GOVERNMENT

## 2019-12-09 VITALS
SYSTOLIC BLOOD PRESSURE: 141 MMHG | OXYGEN SATURATION: 98 % | BODY MASS INDEX: 24.71 KG/M2 | RESPIRATION RATE: 16 BRPM | WEIGHT: 163 LBS | HEIGHT: 68 IN | HEART RATE: 88 BPM | DIASTOLIC BLOOD PRESSURE: 82 MMHG | TEMPERATURE: 99 F

## 2019-12-09 DIAGNOSIS — J40 BRONCHITIS: Primary | ICD-10-CM

## 2019-12-09 PROCEDURE — 99214 PR OFFICE/OUTPT VISIT, EST, LEVL IV, 30-39 MIN: ICD-10-PCS | Mod: S$GLB,,, | Performed by: PHYSICIAN ASSISTANT

## 2019-12-09 PROCEDURE — 71046 XR CHEST PA AND LATERAL: ICD-10-PCS | Mod: S$GLB,,, | Performed by: RADIOLOGY

## 2019-12-09 PROCEDURE — 99214 OFFICE O/P EST MOD 30 MIN: CPT | Mod: S$GLB,,, | Performed by: PHYSICIAN ASSISTANT

## 2019-12-09 PROCEDURE — 71046 X-RAY EXAM CHEST 2 VIEWS: CPT | Mod: S$GLB,,, | Performed by: RADIOLOGY

## 2019-12-09 RX ORDER — AZITHROMYCIN 250 MG/1
TABLET, FILM COATED ORAL
Qty: 6 TABLET | Refills: 0 | Status: SHIPPED | OUTPATIENT
Start: 2019-12-09 | End: 2021-04-21

## 2019-12-09 RX ORDER — ALBUTEROL SULFATE 90 UG/1
1-2 AEROSOL, METERED RESPIRATORY (INHALATION) EVERY 6 HOURS PRN
Qty: 1 INHALER | Refills: 0 | Status: SHIPPED | OUTPATIENT
Start: 2019-12-09 | End: 2021-04-21

## 2019-12-09 RX ORDER — BENZONATATE 100 MG/1
100 CAPSULE ORAL 3 TIMES DAILY PRN
Qty: 20 CAPSULE | Refills: 0 | Status: SHIPPED | OUTPATIENT
Start: 2019-12-09 | End: 2021-04-21

## 2019-12-09 NOTE — PROGRESS NOTES
"Subjective:       Patient ID: Sonido Loera is a 54 y.o. female.    Vitals:  height is 5' 8" (1.727 m) and weight is 73.9 kg (163 lb). Her tympanic temperature is 99.2 °F (37.3 °C). Her blood pressure is 141/82 (abnormal) and her pulse is 88. Her respiration is 16 and oxygen saturation is 98%.     Chief Complaint: Cough    Patient symptoms began two sundays ago.  She has not taken anything for this issue.    55yo female presents with c/o decreased energy levels, runny nose, chest congestion, productive cough, chest tightness with cough x nine days. Patient states she feels like symptoms are worsening past two days. Denies any fevers, chills, sore throat, chest pain, dyspnea, wheezing. No hx of asthma or other lung conditions. Former smoker. + sick contacts.    Cough   This is a new problem. The current episode started 1 to 4 weeks ago. The problem has been gradually worsening. The problem occurs constantly. The cough is productive of sputum. Associated symptoms include shortness of breath. Pertinent negatives include no chest pain, chills, ear pain, eye redness, fever, hemoptysis, myalgias, rash, sore throat or wheezing.       Constitution: Positive for activity change and fatigue. Negative for chills, sweating and fever.   HENT: Positive for congestion. Negative for ear pain, sinus pain, sinus pressure and sore throat.    Neck: Negative for painful lymph nodes.   Cardiovascular: Negative for chest pain and palpitations.   Eyes: Negative for eye discharge and eye redness.   Respiratory: Positive for chest tightness, cough, sputum production and shortness of breath. Negative for bloody sputum, COPD, stridor, wheezing and asthma.    Gastrointestinal: Negative for nausea and vomiting.   Musculoskeletal: Negative for muscle ache.   Skin: Negative for rash.   Allergic/Immunologic: Negative for seasonal allergies and asthma.   Hematologic/Lymphatic: Negative for swollen lymph nodes.       Objective:      Physical Exam "   Constitutional: She appears well-developed and well-nourished. No distress.   HENT:   Head: Normocephalic and atraumatic.   Right Ear: Tympanic membrane, external ear and ear canal normal.   Left Ear: Tympanic membrane, external ear and ear canal normal.   Nose: Nose normal.   Mouth/Throat: Uvula is midline, oropharynx is clear and moist and mucous membranes are normal.   Eyes: Pupils are equal, round, and reactive to light. Conjunctivae are normal. Right eye exhibits no discharge. Left eye exhibits no discharge.   Neck: Normal range of motion. Neck supple.   Cardiovascular: Normal rate, regular rhythm and intact distal pulses.   No murmur heard.  Pulmonary/Chest: Effort normal and breath sounds normal.   Coarse sounds in left lower posterior.   Musculoskeletal: She exhibits no edema.   Lymphadenopathy:     She has no cervical adenopathy.   Neurological: She is alert.   Skin: Skin is warm, dry, not diaphoretic and no rash.   Psychiatric: She has a normal mood and affect. Her behavior is normal.   Nursing note and vitals reviewed.    Chest X-ray: No acute findings. EM      Assessment:       1. Bronchitis        Plan:         Bronchitis  -     XR CHEST PA AND LATERAL; Future; Expected date: 12/09/2019  -     benzonatate (TESSALON) 100 MG capsule; Take 1 capsule (100 mg total) by mouth 3 (three) times daily as needed for Cough.  Dispense: 20 capsule; Refill: 0  -     azithromycin (Z-WILLIAM) 250 MG tablet; Take first 2 tablets together, then 1 every day until finished for total of five day course.  Dispense: 6 tablet; Refill: 0  -     albuterol (PROVENTIL/VENTOLIN HFA) 90 mcg/actuation inhaler; Inhale 1-2 puffs into the lungs every 6 (six) hours as needed for Wheezing or Shortness of Breath.  Dispense: 1 Inhaler; Refill: 0    PATIENT INSTRUCTIONS:  Push fluids, rest.  Recommend over the counter Mucinex for chest congestion.  Take prescribed medications as directed.  Recheck with primary care provider or here in 4-5 days  if no improvement, sooner if worsens.

## 2019-12-09 NOTE — PATIENT INSTRUCTIONS
Push fluids, rest.  Recommend over the counter Mucinex for chest congestion.  Take prescribed medications as directed.  Recheck with primary care provider or here in 4-5 days if no improvement, sooner if worsens.      Acute Bronchitis  Your healthcare provider has told you that you have acute bronchitis. Bronchitis is infection or inflammation of the bronchial tubes (airways in the lungs). Normally, air moves easily in and out of the airways. Bronchitis narrows the airways, making it harder for air to flow in and out of the lungs. This causes symptoms such as shortness of breath, coughing up yellow or green mucus, and wheezing. Bronchitis can be acute or chronic. Acute means the condition comes on quickly and goes away in a short time, usually within 3 to 10 days. Chronic means a condition lasts a long time and often comes back.    What causes acute bronchitis?  Acute bronchitis almost always starts as a viral respiratory infection, such as a cold or the flu. Certain factors make it more likely for a cold or flu to turn into bronchitis. These include being very young, being elderly, having a heart or lung problem, or having a weak immune system. Cigarette smoking also makes bronchitis more likely.  When bronchitis develops, the airways become swollen. The airways may also become infected with bacteria. This is known as a secondary infection.  Diagnosing acute bronchitis  Your healthcare provider will examine you and ask about your symptoms and health history. You may also have a sputum culture to test the fluid in your lungs. Chest X-rays may be done to look for infection in the lungs.  Treating acute bronchitis  Bronchitis usually clears up as the cold or flu goes away. You can help feel better faster by doing the following:  · Take medicine as directed. You may be told to take ibuprofen or other over-the-counter medicines. These help relieve inflammation in your bronchial tubes. Your healthcare provider may  prescribe an inhaler to help open up the bronchial tubes. Most of the time, acute bronchitis is caused by a viral infection. Antibiotics are usually not prescribed for viral infections.  · Drink plenty of fluids, such as water, juice, or warm soup. Fluids loosen mucus so that you can cough it up. This helps you breathe more easily. Fluids also prevent dehydration.  · Make sure you get plenty of rest.  · Do not smoke. Do not allow anyone else to smoke in your home.  Recovery and follow-up  Follow up with your doctor as you are told. You will likely feel better in a week or two. But a dry cough can linger beyond that time. Let your doctor know if you still have symptoms (other than a dry cough) after 2 weeks, or if youre prone to getting bronchial infections. Take steps to protect yourself from future infections. These steps include stopping smoking and avoiding tobacco smoke, washing your hands often, and getting a yearly flu shot.  When to call your healthcare provider  Call the healthcare provider if you have any of the following:  · Fever of 100.4°F (38.0°C) or higher, or as advised  · Symptoms that get worse, or new symptoms  · Trouble breathing  · Symptoms that dont start to improve within a week, or within 3 days of taking antibiotics   Date Last Reviewed: 12/1/2016  © 6113-4361 [employer] [address] [place] [address], [place] [address]. All rights reserved. This information is not intended as a substitute for professional medical care. Always follow your healthcare professional's instructions.

## 2020-02-12 ENCOUNTER — TELEPHONE (OUTPATIENT)
Dept: RHEUMATOLOGY | Facility: CLINIC | Age: 55
End: 2020-02-12

## 2020-02-12 NOTE — TELEPHONE ENCOUNTER
----- Message from Kanika Arguelles sent at 2/12/2020  1:22 PM CST -----  Type: Needs Medical Advice    Who Called:  patient  Best Call Back Number: [phone]  Additional Information: patient wants to know if she can get her prescriptiond written for 9 day supply. Please give call back and advise    LVM asking if Naltrexone and to verify pharmacy also. Call back number is given. RANJIT

## 2020-03-08 ENCOUNTER — PATIENT OUTREACH (OUTPATIENT)
Dept: ADMINISTRATIVE | Facility: OTHER | Age: 55
End: 2020-03-08

## 2020-03-09 ENCOUNTER — OFFICE VISIT (OUTPATIENT)
Dept: NEUROLOGY | Facility: CLINIC | Age: 55
End: 2020-03-09
Payer: OTHER GOVERNMENT

## 2020-03-09 ENCOUNTER — TELEPHONE (OUTPATIENT)
Dept: NEUROLOGY | Facility: CLINIC | Age: 55
End: 2020-03-09

## 2020-03-09 VITALS
RESPIRATION RATE: 16 BRPM | HEIGHT: 68 IN | DIASTOLIC BLOOD PRESSURE: 82 MMHG | WEIGHT: 172.94 LBS | HEART RATE: 69 BPM | SYSTOLIC BLOOD PRESSURE: 144 MMHG | BODY MASS INDEX: 26.21 KG/M2

## 2020-03-09 DIAGNOSIS — M54.81 BILATERAL OCCIPITAL NEURALGIA: ICD-10-CM

## 2020-03-09 DIAGNOSIS — M79.18 CERVICAL MYOFASCIAL PAIN SYNDROME: Primary | ICD-10-CM

## 2020-03-09 PROCEDURE — 99204 OFFICE O/P NEW MOD 45 MIN: CPT | Mod: S$PBB,,, | Performed by: PSYCHIATRY & NEUROLOGY

## 2020-03-09 PROCEDURE — 99214 OFFICE O/P EST MOD 30 MIN: CPT | Mod: PBBFAC,PO | Performed by: PSYCHIATRY & NEUROLOGY

## 2020-03-09 PROCEDURE — 99999 PR PBB SHADOW E&M-EST. PATIENT-LVL IV: ICD-10-PCS | Mod: PBBFAC,,, | Performed by: PSYCHIATRY & NEUROLOGY

## 2020-03-09 PROCEDURE — 99204 PR OFFICE/OUTPT VISIT, NEW, LEVL IV, 45-59 MIN: ICD-10-PCS | Mod: S$PBB,,, | Performed by: PSYCHIATRY & NEUROLOGY

## 2020-03-09 PROCEDURE — 99999 PR PBB SHADOW E&M-EST. PATIENT-LVL IV: CPT | Mod: PBBFAC,,, | Performed by: PSYCHIATRY & NEUROLOGY

## 2020-03-09 NOTE — LETTER
March 9, 2020      Evelyn Kumar, DO  [address] [facility] [address] [place] [address]           [facility] [place]  [address] [place] [address]  Phone: [phone]  Fax: [phone]          Patient: Sonido oLera   MR Number: [id number]   YOB: 1965   Date of Visit: 3/9/2020       Dear Dr. Evelyn Kumar:    Thank you for referring Sonido Loera to me for evaluation. Attached you will find relevant portions of my assessment and plan of care.    If you have questions, please do not hesitate to call me. I look forward to following Sonido Loera along with you.    Sincerely,    Na Singh MD    Enclosure  CC:  No Recipients    If you would like to receive this communication electronically, please contact [email or web address] or [phone] to request more information on [employer] Link access.    For providers and/or their staff who would like to refer a patient to [facility], please contact us through our one-stop-shop provider referral line, [facility], at [phone].    If you feel you have received this communication in error or would no longer like to receive these types of communications, please e-mail [email or web address]

## 2020-03-09 NOTE — PATIENT INSTRUCTIONS
Please call our clinic at [phone] or send a message to Dr. Singh on the [employer] portal if there are any changes to the plan described below, for example,if you are not contacted for the requested tests, referral(s) within one week, if you are unable to receive the medications prescribed, or if you feel you need to change the treatment course for any reason.     TESTING:  -- none     REFERRALS:  Refer to physical therapy for cervical myofascial release   [facility]  [facility]  [phone]    PREVENTION (use daily regardless of headache):  -- start magnesium in ONE of the following preparations -    1. Magnesium oxide 800mg daily (the most common over the counter kind, may causes loose stools)   2. Magnesium citrate 400-500mg daily (harder to find, but more neutral on the bowels)   3. Magnesium glycinate 400mg daily (hardest to find, look online, but most bowel-neutral, best absorbed)   -- can look for natural calm magensium powder     AS-NEEDED TREATMENT (use total no more than 10 days per month unless otherwise stated):  -- seek approval for cervical trigger point injections and occipital nerve block (with steroids)

## 2020-03-19 ENCOUNTER — TELEPHONE (OUTPATIENT)
Dept: NEUROLOGY | Facility: CLINIC | Age: 55
End: 2020-03-19

## 2020-03-19 NOTE — TELEPHONE ENCOUNTER
"Hello this is the last note on the referral entered this am     [facility] has not approved the request for this patient, it is still pending. Jose E left this message with me   "SPONSOR MUST CONTACT [facility] TO UPDATE ADDRESS, [phone], OR ON-LINE AT  [email or web address] PT WILL NEED TO HAVE ADDRESS UPDATED  BEFORE WE CAN REVIEW/APPROVE THIS REQUEST. THANK YOU"  I left a voicemail for the patient explaining what  is needing her to do  "

## 2020-03-19 NOTE — TELEPHONE ENCOUNTER
Left voicemail informing pt to call Jose E at [phone] to update address so medication authorization can be reviewed/approved.

## 2020-03-26 ENCOUNTER — TELEPHONE (OUTPATIENT)
Dept: NEUROLOGY | Facility: CLINIC | Age: 55
End: 2020-03-26

## 2020-03-26 NOTE — TELEPHONE ENCOUNTER
Left voicemail cancelling appt due to COVID-19 concerns. Instructed pt added to wait list to reschedule when patients seen in clinic. Portal message sent.

## 2020-04-17 ENCOUNTER — TELEPHONE (OUTPATIENT)
Dept: NEUROLOGY | Facility: CLINIC | Age: 55
End: 2020-04-17

## 2020-04-22 ENCOUNTER — PATIENT MESSAGE (OUTPATIENT)
Dept: NEUROLOGY | Facility: CLINIC | Age: 55
End: 2020-04-22

## 2020-05-06 ENCOUNTER — PATIENT OUTREACH (OUTPATIENT)
Dept: ADMINISTRATIVE | Facility: OTHER | Age: 55
End: 2020-05-06

## 2020-05-07 ENCOUNTER — OFFICE VISIT (OUTPATIENT)
Dept: RHEUMATOLOGY | Facility: CLINIC | Age: 55
End: 2020-05-07
Payer: OTHER GOVERNMENT

## 2020-05-07 DIAGNOSIS — M79.7 FIBROMYALGIA: Primary | ICD-10-CM

## 2020-05-07 DIAGNOSIS — M15.4 EROSIVE OSTEOARTHRITIS: ICD-10-CM

## 2020-05-07 PROCEDURE — 99214 OFFICE O/P EST MOD 30 MIN: CPT | Mod: 95,,, | Performed by: INTERNAL MEDICINE

## 2020-05-07 PROCEDURE — 99214 PR OFFICE/OUTPT VISIT, EST, LEVL IV, 30-39 MIN: ICD-10-PCS | Mod: 95,,, | Performed by: INTERNAL MEDICINE

## 2020-05-07 NOTE — PROGRESS NOTES
Subjective:          Chief Complaint: Sonido Loera is a 54 y.o. female who had concerns including Disease Management.    HPI:    Patient is a 50 y/o female with aches and pains for approximately 4 years in joints and long bones. INtermittent. 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 Present for 22 years. Did have cholecystectomy years ago but did not resolve the issue. Twice/year she has severe pain.   Exacerbated with loss of sleep over a period of time.    Started LDN: 4.5 mg daily since 11/2019   Aleve 220mg BID    We trialed gabapentin low dose, but she developed dizziness/vertigo. She has hx of vertigo type migraine, with sleep deprivation and gabapentin seemed to trigger this.   Doing very well with LDN-has markedly improved her overall symptoms.     DIP nodes with intermittent pain continues  Hands are swollen more last 2 months.       REVIEW OF SYSTEMS:    Review of Systems   Constitutional: Positive for malaise/fatigue. Negative for fever and weight loss.   HENT: Negative for sore throat.    Eyes: Negative for double vision, photophobia and redness.   Respiratory: Negative for cough, shortness of breath and wheezing.    Cardiovascular: Negative for chest pain, palpitations and orthopnea.   Gastrointestinal: Negative for abdominal pain, constipation and diarrhea.   Genitourinary: Negative for dysuria, hematuria and urgency.   Musculoskeletal: Positive for joint pain and myalgias. Negative for back pain.   Skin: Negative for rash.   Neurological: Negative for dizziness, tingling, focal weakness and headaches.   Endo/Heme/Allergies: Does not bruise/bleed easily.   Psychiatric/Behavioral: Negative for depression, hallucinations and suicidal ideas. The patient has insomnia.                Objective:            Past Medical History:   Diagnosis Date    Allergic rhinitis due to pollen     Breast cancer 2009    left lumpectomy    Colon polyp     Encounter  for blood transfusion     after miscarriage     Fibromyalgia     Headache     Hernia of abdominal wall     Joint pain     Liver cyst     Thyroid nodule     nodule    Umbilical hernia      Family History   Problem Relation Age of Onset    Rhinitis Daughter     Breast cancer Mother 82    Breast cancer Maternal Aunt 78        mother's twin    Liver cancer Paternal Aunt     Cirrhosis Maternal Grandmother     Heart disease Father     Chronic infections Sister     No Known Problems Brother     AVM Sister     Urticaria Neg Hx     Immunodeficiency Neg Hx     Eczema Neg Hx     Asthma Neg Hx     Angioedema Neg Hx     Colon cancer Neg Hx     Crohn's disease Neg Hx     Ulcerative colitis Neg Hx     Esophageal cancer Neg Hx      Social History     Tobacco Use    Smoking status: Former Smoker     Packs/day: 1.00     Years: 3.00     Pack years: 3.00     Types: Cigarettes     Last attempt to quit: 1983     Years since quittin.3    Smokeless tobacco: Never Used   Substance Use Topics    Alcohol use: Yes     Alcohol/week: 1.0 standard drinks     Types: 1 Glasses of wine per week     Frequency: Monthly or less     Drinks per session: 1 or 2     Binge frequency: Never     Comment: 1-2 times a year    Drug use: Never         Current Outpatient Medications on File Prior to Visit   Medication Sig Dispense Refill    albuterol (PROVENTIL/VENTOLIN HFA) 90 mcg/actuation inhaler Inhale 1-2 puffs into the lungs every 6 (six) hours as needed for Wheezing or Shortness of Breath. 1 Inhaler 0    ascorbic acid (VITAMIN C) 500 MG tablet Take 500 mg by mouth once daily.      b complex vitamins capsule Take 1 capsule by mouth once daily.      cetirizine (ZYRTEC) 10 MG tablet Take 10 mg by mouth once daily.      loratadine (CLARITIN) 10 mg tablet Take 10 mg by mouth once daily.      MAGNESIUM GLYCINATE ORAL Take 400 mg by mouth once daily.      multivitamin (ONE DAILY MULTIVITAMIN) per tablet Take 1 tablet  by mouth once daily.      naltrexone capsule Take 1 capsule (4.5 mg total) by mouth every evening. 90 capsule 3    naproxen sodium (ANAPROX) 220 MG tablet Take 220 mg by mouth every 12 (twelve) hours.      NON FORMULARY MEDICATION Take 1 tablet by mouth once daily.       azithromycin (Z-WILLIAM) 250 MG tablet Take first 2 tablets together, then 1 every day until finished for total of five day course. 6 tablet 0    benzonatate (TESSALON) 100 MG capsule Take 1 capsule (100 mg total) by mouth 3 (three) times daily as needed for Cough. (Patient not taking: Reported on 3/9/2020) 20 capsule 0     No current facility-administered medications on file prior to visit.        There were no vitals filed for this visit.    Physical Exam:    Physical Exam   Constitutional: She is oriented to person, place, and time. She appears well-developed and well-nourished.   Eyes: Lids are normal.   Neurological: She is oriented to person, place, and time.   Psychiatric: She has a normal mood and affect. Her behavior is normal. Thought content normal.             Assessment:       Encounter Diagnoses   Name Primary?    Fibromyalgia Yes    Erosive osteoarthritis           Plan:        Fibromyalgia    Erosive osteoarthritis      Very pleasant 53 y/o female with myalgia and polyarthralgia triggered with sleep deprivation. Does not have the typic tender points of FMS but highly suspect myofascial pain       OA hands-NSAIDs PRN> reviewed natural progression of OA hands and changes of the DIP joints. Discussed wrapping DIP joints PRN    Spent time discussing FMS with patient and multidisciplinary approach to therapy with pharmacologic, psychologic, lifestyle modification and in particular low impact short interval exercise such as walking, latonia chi, yoga and swimming. Discussed the importance of sleep. Discussed exercise modalities.    -continue low dose naltrexone 4.5mg daily compounded.    She had benefit with MSK complaints on Topiramate but  noted significant rapid weight gain     No follow-ups on file.        25min consultation with greater than 50% spent in counseling, chart review and coordination of care. All questions answered.  Thank you for allowing me to participate in the care of this very pleasant patient.      F/u in 6 months  The patient location is: Home [place]  The chief complaint leading to consultation is: medication management and f/u   Visit type: Virtual visit with synchronous audio and video  Total time spent with patient: 25  Each patient to whom he or she provides medical services by telemedicine is:  (1) informed of the relationship between the physician and patient and the respective role of any other health care provider with respect to management of the patient; and (2) notified that he or she may decline to receive medical services by telemedicine and may withdraw from such care at any time.    Notes:   As above.

## 2020-05-12 ENCOUNTER — TELEPHONE (OUTPATIENT)
Dept: NEUROLOGY | Facility: CLINIC | Age: 55
End: 2020-05-12

## 2020-05-21 ENCOUNTER — PATIENT MESSAGE (OUTPATIENT)
Dept: NEUROLOGY | Facility: CLINIC | Age: 55
End: 2020-05-21

## 2020-06-05 ENCOUNTER — PATIENT MESSAGE (OUTPATIENT)
Dept: NEUROLOGY | Facility: CLINIC | Age: 55
End: 2020-06-05

## 2020-06-08 ENCOUNTER — PROCEDURE VISIT (OUTPATIENT)
Dept: NEUROLOGY | Facility: CLINIC | Age: 55
End: 2020-06-08
Payer: OTHER GOVERNMENT

## 2020-06-08 VITALS
HEIGHT: 68 IN | HEART RATE: 71 BPM | SYSTOLIC BLOOD PRESSURE: 123 MMHG | WEIGHT: 173 LBS | DIASTOLIC BLOOD PRESSURE: 76 MMHG | RESPIRATION RATE: 16 BRPM | BODY MASS INDEX: 26.22 KG/M2

## 2020-06-08 DIAGNOSIS — M54.81 BILATERAL OCCIPITAL NEURALGIA: ICD-10-CM

## 2020-06-08 DIAGNOSIS — M79.18 CERVICAL MYOFASCIAL PAIN SYNDROME: Primary | ICD-10-CM

## 2020-06-08 RX ORDER — TRAMADOL HYDROCHLORIDE 50 MG/1
TABLET ORAL
Qty: 10 EACH | Refills: 1 | Status: SHIPPED | OUTPATIENT
Start: 2020-06-08 | End: 2022-06-28

## 2020-06-08 NOTE — PROCEDURES
Procedures     Greater and Lesser Occipital Nerve Block, left     After risks and benefits were explained including bleeding, infection, worsening of the pain, damage to the area being injected, weakness, allergic reaction to medications, vascular injection, and nerve damage, signed consent was obtained. All questions were answered.     The area of the greater occipital nerve was identified as a point 1/3rds the distance  between the occipital protuberance and the ipsilateral mastoid process. The area of the lesser occipital nerve (if applicable) was identified as a point 2/3rds the distance between the occipital protuberance and the ipsilateral mastoid process.The identified areas were prepped three times with alcohol and the alcohol allowed to dry. Next, a 27 gauge 1 inch needle was placed in the anatomical area of the SEBASTIAN. Once reproduction of the pain was elicited and negative aspiration confirmed, I proceeded with the injection.     This was repeated for the IMTIAZ.     I proceeded to repeat the injection on the right side starting with the SEBASTIAN. The plan was to perform a bilateral occipital block and trigger point injections to the upper and lower cervical spine. The patient began to feel lightheaded as I was performing the right SEBASTIAN block so I immediately terminated the procedure, provided her with an ice pack to the forehead and neck, and with the assistance of the clinic MA, lowered her to the ground. We called a triage response team but on arrival the patient was feeling better so treatments were not pursued. The vitals during the vasovagal event were 114/65 with HR 48 and following this normalized to 123/76 and HR 71. SPO2 98.Triage team dismissed. Patient assisted to the exam table so she could rest in the supine position. I monitored her for approximately 10 minutes at which point I answered all of her 's questions about her condition. I recommended physical therapy as part of her plan going forward,  which was the original plan anyway but PT was delayed due to COVID. I placed a new referral and gave contact information for scheduling PT.  Her  asked for his wife to have some tramadol.  He takes tramadol and he broke the tablet in half and give it to her yesterday and she found it surprisingly effective for her pain.  I prescribed tramadol 50 mg #10 tables for the month, and I advised that this will not have its full potency due to her being on naltrexone for fibromyalgia.    Medication used: Marcaine 0.25% (60%), Lidocaine 1% (40%) and Triamcinolone 40mg - approximately 5 cc out  prepared 20cc was administered as the procedure was aborted prematurely    Total volume infused:  5 cc    Once the patient was feeling sufficiently well to sit and ambulate, she was fully conversant, feeling better, she walked out of the clinic with her .     RTC as scheduled

## 2020-06-08 NOTE — PATIENT INSTRUCTIONS
Refer to physical therapy for cervical myofascial release   [facility] [address] [facility], [place]  [phone]

## 2020-06-09 ENCOUNTER — TELEPHONE (OUTPATIENT)
Dept: NEUROLOGY | Facility: CLINIC | Age: 55
End: 2020-06-09

## 2020-06-09 NOTE — TELEPHONE ENCOUNTER
Pt returned call. Notified that DR Singh was requesting a 2 month follow up appt. Appt set. Date/Time/Location confirmed with pt. Verbalized understanding.

## 2020-06-09 NOTE — TELEPHONE ENCOUNTER
----- Message from Na Singh MD sent at 6/8/2020  5:01 PM CDT -----  Please schedule office follow-up in 2 months

## 2020-06-09 NOTE — TELEPHONE ENCOUNTER
Called pt to schedule 2 month follow up per DR Singh's request. No answer, left voice mail to call clinic to set appt.

## 2020-07-16 ENCOUNTER — PATIENT MESSAGE (OUTPATIENT)
Dept: RHEUMATOLOGY | Facility: CLINIC | Age: 55
End: 2020-07-16

## 2020-08-04 ENCOUNTER — TELEPHONE (OUTPATIENT)
Dept: NEUROLOGY | Facility: CLINIC | Age: 55
End: 2020-08-04

## 2020-08-04 NOTE — TELEPHONE ENCOUNTER
Called patient and left message in regards to reschedling appointment due to Dr. Singh being out of clinic, informed in message I was canceling appointment and for her to call back to reschedule. Patient can see CARLINE

## 2020-10-05 ENCOUNTER — PATIENT MESSAGE (OUTPATIENT)
Dept: ADMINISTRATIVE | Facility: HOSPITAL | Age: 55
End: 2020-10-05

## 2020-11-02 ENCOUNTER — TELEPHONE (OUTPATIENT)
Dept: RHEUMATOLOGY | Facility: CLINIC | Age: 55
End: 2020-11-02

## 2020-11-02 NOTE — TELEPHONE ENCOUNTER
----- Message from Shell Rowe sent at 11/2/2020 10:03 AM CST -----  Patient has a conflict with the appt on 11/5 at 2:30.  She is wondering if you could change it to 12 or 1pm  on 11/18 that would be wonderful for her.  Please call her at [phone].  Thank you!

## 2020-11-03 ENCOUNTER — PATIENT OUTREACH (OUTPATIENT)
Dept: ADMINISTRATIVE | Facility: OTHER | Age: 55
End: 2020-11-03

## 2020-11-03 NOTE — PROGRESS NOTES
Health Maintenance Due   Topic Date Due    TETANUS VACCINE  09/15/1981    Pneumococcal Vaccine (Medium Risk) (1 of 1 - PPSV23) 09/15/1982    Shingles Vaccine (1 of 2) 09/15/2013    Influenza Vaccine (1) 08/01/2020     Updates were requested from care everywhere.  Chart was reviewed for overdue Proactive [facility] Encounters ([facility]) topics (CRS, Breast Cancer Screening, Eye exam)  Health Maintenance has been updated.  LINKS immunization registry triggered.  LINKS not responding.

## 2020-11-05 ENCOUNTER — PATIENT MESSAGE (OUTPATIENT)
Dept: RHEUMATOLOGY | Facility: CLINIC | Age: 55
End: 2020-11-05

## 2020-11-13 ENCOUNTER — OFFICE VISIT (OUTPATIENT)
Dept: RHEUMATOLOGY | Facility: CLINIC | Age: 55
End: 2020-11-13
Payer: OTHER GOVERNMENT

## 2020-11-13 DIAGNOSIS — M79.7 FIBROMYALGIA: ICD-10-CM

## 2020-11-13 DIAGNOSIS — M79.18 CERVICAL MYOFASCIAL PAIN SYNDROME: ICD-10-CM

## 2020-11-13 DIAGNOSIS — M15.4 EROSIVE OSTEOARTHRITIS: Primary | ICD-10-CM

## 2020-11-13 PROCEDURE — 99214 OFFICE O/P EST MOD 30 MIN: CPT | Mod: 95,,, | Performed by: INTERNAL MEDICINE

## 2020-11-13 PROCEDURE — 99214 PR OFFICE/OUTPT VISIT, EST, LEVL IV, 30-39 MIN: ICD-10-PCS | Mod: 95,,, | Performed by: INTERNAL MEDICINE

## 2020-11-13 NOTE — PROGRESS NOTES
Subjective:          Chief Complaint: Sonido Loera is a 55 y.o. female who had no chief complaint listed for this encounter.    HPI:    Patient is a 52 y/o female with aches and pains for approximately 4 years in joints and long bones. INtermittent. 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 Present for 22 years. Did have cholecystectomy years ago but did not resolve the issue. Twice/year she has severe pain.   Exacerbated with loss of sleep over a period of time.      Aleve 220mg BID    We trialed gabapentin low dose, but she developed dizziness/vertigo. She has hx of vertigo type migraine, with sleep deprivation and gabapentin seemed to trigger this.   Doing very well with LDN-has markedly improved her overall symptoms.     DIP nodes with intermittent pain continues  Hands are swollen more last 2 months.   But she noted her HAs were worsening, referred to Dr. Singh for occipital block,  but patient with with vasovagal syncope pre-procedure.     REVIEW OF SYSTEMS:    Review of Systems   Constitutional: Positive for malaise/fatigue. Negative for fever and weight loss.   HENT: Negative for sore throat.    Eyes: Negative for double vision, photophobia and redness.   Respiratory: Negative for cough, shortness of breath and wheezing.    Cardiovascular: Negative for chest pain, palpitations and orthopnea.   Gastrointestinal: Negative for abdominal pain, constipation and diarrhea.   Genitourinary: Negative for dysuria, hematuria and urgency.   Musculoskeletal: Positive for joint pain and myalgias. Negative for back pain.   Skin: Negative for rash.   Neurological: Negative for dizziness, tingling, focal weakness and headaches.   Endo/Heme/Allergies: Does not bruise/bleed easily.   Psychiatric/Behavioral: Negative for depression, hallucinations and suicidal ideas. The patient has insomnia.                Objective:            Past Medical History:   Diagnosis Date     Allergic rhinitis due to pollen     Breast cancer 2009    left lumpectomy    Colon polyp     Encounter for blood transfusion     after miscarriage     Erosive osteoarthritis 2020    Fibromyalgia     Headache     Hernia of abdominal wall     Joint pain     Liver cyst     Thyroid nodule     nodule    Umbilical hernia      Family History   Problem Relation Age of Onset    Rhinitis Daughter     Breast cancer Mother 82    Breast cancer Maternal Aunt 78        mother's twin    Liver cancer Paternal Aunt     Cirrhosis Maternal Grandmother     Heart disease Father     Chronic infections Sister     No Known Problems Brother     AVM Sister     Urticaria Neg Hx     Immunodeficiency Neg Hx     Eczema Neg Hx     Asthma Neg Hx     Angioedema Neg Hx     Colon cancer Neg Hx     Crohn's disease Neg Hx     Ulcerative colitis Neg Hx     Esophageal cancer Neg Hx      Social History     Tobacco Use    Smoking status: Former Smoker     Packs/day: 1.00     Years: 3.00     Pack years: 3.00     Types: Cigarettes     Quit date: 1983     Years since quittin.8    Smokeless tobacco: Never Used   Substance Use Topics    Alcohol use: Yes     Alcohol/week: 1.0 standard drinks     Types: 1 Glasses of wine per week     Frequency: Monthly or less     Drinks per session: 1 or 2     Binge frequency: Never     Comment: 1-2 times a year    Drug use: Never         Current Outpatient Medications on File Prior to Visit   Medication Sig Dispense Refill    albuterol (PROVENTIL/VENTOLIN HFA) 90 mcg/actuation inhaler Inhale 1-2 puffs into the lungs every 6 (six) hours as needed for Wheezing or Shortness of Breath. 1 Inhaler 0    ascorbic acid (VITAMIN C) 500 MG tablet Take 500 mg by mouth once daily.      azithromycin (Z-WILLIAM) 250 MG tablet Take first 2 tablets together, then 1 every day until finished for total of five day course. 6 tablet 0    b complex vitamins capsule Take 1 capsule by mouth once daily.       benzonatate (TESSALON) 100 MG capsule Take 1 capsule (100 mg total) by mouth 3 (three) times daily as needed for Cough. 20 capsule 0    cetirizine (ZYRTEC) 10 MG tablet Take 10 mg by mouth once daily.      loratadine (CLARITIN) 10 mg tablet Take 10 mg by mouth once daily.      MAGNESIUM GLYCINATE ORAL Take 400 mg by mouth once daily.      multivitamin (ONE DAILY MULTIVITAMIN) per tablet Take 1 tablet by mouth once daily.      naltrexone capsule Take 1 capsule (4.5 mg total) by mouth every evening. 90 capsule 3    naproxen sodium (ANAPROX) 220 MG tablet Take 220 mg by mouth every 12 (twelve) hours.      NON FORMULARY MEDICATION Take 1 tablet by mouth once daily.       traMADoL (ULTRAM) 50 mg tablet 1 tab PO as needed for severe migraine. No more than 10 days per month. 10 each 1     No current facility-administered medications on file prior to visit.        There were no vitals filed for this visit.    Physical Exam:    Physical Exam  Constitutional:       Appearance: She is well-developed.   Eyes:      General: Lids are normal.   Neurological:      Mental Status: She is oriented to person, place, and time.   Psychiatric:         Behavior: Behavior normal.         Thought Content: Thought content normal.               Assessment:       Encounter Diagnoses   Name Primary?    Erosive osteoarthritis Yes    Fibromyalgia     Cervical myofascial pain syndrome           Plan:        Erosive osteoarthritis    Fibromyalgia    Cervical myofascial pain syndrome      Very pleasant 53 y/o female with myalgia and polyarthralgia triggered with sleep deprivation. Does not have the typic tender points of FMS but highly suspect myofascial pain       OA hands-NSAIDs PRN> reviewed natural progression of OA hands and changes of the DIP joints. Discussed wrapping DIP joints PRN    Spent time discussing FMS with patient and multidisciplinary approach to therapy with pharmacologic, psychologic, lifestyle modification and in  particular low impact short interval exercise such as walking, lat[place] chi, yoga and swimming. Discussed the importance of sleep. Discussed exercise modalities.    -continue low dose naltrexone 4.5mg daily compounded.    She had benefit with MSK complaints on Topiramate but noted significant rapid weight gain     No follow-ups on file.        25min consultation with greater than 50% spent in counseling, chart review and coordination of care. All questions answered.  Thank you for allowing me to participate in the care of this very pleasant patient.      F/u in 6 months  The patient location is: Home [place]  The chief complaint leading to consultation is: medication management and f/u   Visit type: Virtual visit with synchronous audio and video  Total time spent with patient: 25  Each patient to whom he or she provides medical services by telemedicine is:  (1) informed of the relationship between the physician and patient and the respective role of any other health care provider with respect to management of the patient; and (2) notified that he or she may decline to receive medical services by telemedicine and may withdraw from such care at any time.    Notes:   As above.

## 2020-11-18 ENCOUNTER — OFFICE VISIT (OUTPATIENT)
Dept: OBSTETRICS AND GYNECOLOGY | Facility: CLINIC | Age: 55
End: 2020-11-18
Payer: OTHER GOVERNMENT

## 2020-11-18 VITALS
DIASTOLIC BLOOD PRESSURE: 84 MMHG | WEIGHT: 169.56 LBS | SYSTOLIC BLOOD PRESSURE: 122 MMHG | HEIGHT: 68 IN | BODY MASS INDEX: 25.7 KG/M2

## 2020-11-18 DIAGNOSIS — Z76.89 ENCOUNTER TO ESTABLISH CARE: ICD-10-CM

## 2020-11-18 DIAGNOSIS — Z01.419 ENCOUNTER FOR GYNECOLOGICAL EXAMINATION (GENERAL) (ROUTINE) WITHOUT ABNORMAL FINDINGS: Primary | ICD-10-CM

## 2020-11-18 DIAGNOSIS — Z12.39 BREAST CANCER SCREENING, HIGH RISK PATIENT: ICD-10-CM

## 2020-11-18 PROCEDURE — 99999 PR PBB SHADOW E&M-EST. PATIENT-LVL IV: CPT | Mod: PBBFAC,,, | Performed by: OBSTETRICS & GYNECOLOGY

## 2020-11-18 PROCEDURE — 99999 PR PBB SHADOW E&M-EST. PATIENT-LVL IV: ICD-10-PCS | Mod: PBBFAC,,, | Performed by: OBSTETRICS & GYNECOLOGY

## 2020-11-18 PROCEDURE — 99396 PR PREVENTIVE VISIT,EST,40-64: ICD-10-PCS | Mod: S$PBB,,, | Performed by: OBSTETRICS & GYNECOLOGY

## 2020-11-18 PROCEDURE — 99396 PREV VISIT EST AGE 40-64: CPT | Mod: S$PBB,,, | Performed by: OBSTETRICS & GYNECOLOGY

## 2020-11-18 PROCEDURE — 99214 OFFICE O/P EST MOD 30 MIN: CPT | Mod: PBBFAC,PN | Performed by: OBSTETRICS & GYNECOLOGY

## 2020-11-18 NOTE — PROGRESS NOTES
Chief Complaint   Patient presents with    Well Woman     last pap 2 years ago was normal, patient states needs diagnostic mammo order       History and Physical:  Sonido Loera is a 55 y.o. F who presents today for her routine annual GYN exam. The patient has no Gynecology complaints.     Patient's last menstrual period was 2011.  Last Pap: 2yr ago normal  History of abnormal pap: denies  Last Mammogram: 1.5y normal  Colonosocpy: yes, 6yr.   PCP: Norbert Mane MD. Orders routine labs.   Immunization status: stated as current, but no records available. Has not received Zoster >50 & Pneumococcal >65)  Body mass index is 25.78 kg/m².    Allergies:   Review of patient's allergies indicates:   Allergen Reactions    Gabapentin Other (See Comments)     dizzy    Tamoxifen analogues Other (See Comments)     Unclear thinking, shoulder pain    Adhesive Rash     Rash on contact     Past Medical History:   Diagnosis Date    Allergic rhinitis due to pollen     Breast cancer     left lumpectomy    Colon polyp     Encounter for blood transfusion     after miscarriage     Erosive osteoarthritis 2020    Fibromyalgia     Headache     Hernia of abdominal wall     Joint pain     Liver cyst     Thyroid nodule     nodule    Umbilical hernia      Past Surgical History:   Procedure Laterality Date    BREAST LUMPECTOMY Left     with radiation and chemotherapy     SECTION      CHOLECYSTECTOMY      COLONOSCOPY  2015    Dr. Davenport, sent for scanning: one colon polyps removed, tortuous colon, large internal hemorrhoids; biopsy: tubular adenoma, repeat in 5 years for surveillance    ESOPHAGOGASTRODUODENOSCOPY N/A 2019    Procedure: EGD (ESOPHAGOGASTRODUODENOSCOPY);  Surgeon: Michael Hebetr Jr., MD;  [facility]: [facility];  Service: Endoscopy;  Laterality: N/A;    ROBOT-ASSISTED LAPAROSCOPIC REPAIR OF INCISIONAL HERNIA N/A 10/7/2019    Procedure: ROBOTIC REPAIR, HERNIA, INCISIONAL;   Surgeon: Alexis Gonzalez MD;  Location: [facility];  Service: General;  Laterality: N/A;    VEIN SURGERY       MEDS:   Current Outpatient Medications on File Prior to Visit   Medication Sig Dispense Refill    ascorbic acid (VITAMIN C) 500 MG tablet Take 500 mg by mouth once daily.      b complex vitamins capsule Take 1 capsule by mouth once daily.      cetirizine (ZYRTEC) 10 MG tablet Take 10 mg by mouth once daily.      loratadine (CLARITIN) 10 mg tablet Take 10 mg by mouth once daily.      MAGNESIUM GLYCINATE ORAL Take 400 mg by mouth once daily.      multivitamin (ONE DAILY MULTIVITAMIN) per tablet Take 1 tablet by mouth once daily.      naproxen sodium (ANAPROX) 220 MG tablet Take 220 mg by mouth every 12 (twelve) hours.      traMADoL (ULTRAM) 50 mg tablet 1 tab PO as needed for severe migraine. No more than 10 days per month. 10 each 1    albuterol (PROVENTIL/VENTOLIN HFA) 90 mcg/actuation inhaler Inhale 1-2 puffs into the lungs every 6 (six) hours as needed for Wheezing or Shortness of Breath. (Patient not taking: Reported on 2020) 1 Inhaler 0    azithromycin (Z-WILLIAM) 250 MG tablet Take first 2 tablets together, then 1 every day until finished for total of five day course. 6 tablet 0    benzonatate (TESSALON) 100 MG capsule Take 1 capsule (100 mg total) by mouth 3 (three) times daily as needed for Cough. (Patient not taking: Reported on 2020) 20 capsule 0    naltrexone capsule Take 1 capsule (4.5 mg total) by mouth every evening. (Patient not taking: Reported on 2020) 90 capsule 3    NON FORMULARY MEDICATION Take 1 tablet by mouth once daily.        No current facility-administered medications on file prior to visit.      OB History        5    Para   4    Term   4            AB   1    Living           SAB   1    TAB        Ectopic        Multiple        Live Births                   Social History     Socioeconomic History    Marital status:      Spouse name:  Not on file    Number of children: Not on file    Years of education: Not on file    Highest education level: Not on file   Occupational History    Not on file   Social Needs    Financial resource strain: Not hard at all    Food insecurity     Worry: Never true     Inability: Never true    Transportation needs     Medical: No     Non-medical: No   Tobacco Use    Smoking status: Former Smoker     Packs/day: 1.00     Years: 3.00     Pack years: 3.00     Types: Cigarettes     Quit date: 1983     Years since quittin.8    Smokeless tobacco: Never Used   Substance and Sexual Activity    Alcohol use: Yes     Alcohol/week: 1.0 standard drinks     Types: 1 Glasses of wine per week     Frequency: Monthly or less     Drinks per session: 1 or 2     Binge frequency: Never     Comment: 1-2 times a year    Drug use: Never    Sexual activity: Yes     Partners: Male     Birth control/protection: None   Lifestyle    Physical activity     Days per week: 2 days     Minutes per session: 30 min    Stress: Only a little   Relationships    Social connections     Talks on phone: Not on file     Gets together: Not on file     Attends [employer] service: Not on file     Active member of club or organization: Not on file     Attends meetings of clubs or organizations: Not on file     Relationship status: Not on file   Other Topics Concern    Not on file   Social History Narrative    Not on file     Family History   Problem Relation Age of Onset    Rhinitis Daughter     Breast cancer Mother 82    Breast cancer Maternal Aunt 78        mother's twin    Liver cancer Paternal Aunt     Cirrhosis Maternal Grandmother     Heart disease Father     Chronic infections Sister     No Known Problems Brother     AVM Sister     Urticaria Neg Hx     Immunodeficiency Neg Hx     Eczema Neg Hx     Asthma Neg Hx     Angioedema Neg Hx     Colon cancer Neg Hx     Crohn's disease Neg Hx     Ulcerative colitis Neg Hx      "Esophageal cancer Neg Hx        Past medical and surgical history reviewed.   I have reviewed the patient's medical history in detail and updated the computerized patient record.    Review of System:   General: no chills, fever, night sweats, weight gain or weight loss  Breasts: no new or changing breast lumps, nipple discharge or masses.  Gastrointestinal: no abdominal pain, change in bowel habits, or black or bloody stools  Genito-Urinary: no incontinence, urinary frequency/urgency or vulvar/vaginal symptoms, pelvic pain or abnormal vaginal bleeding.    Physical Exam:   /84   Ht 5' 8" (1.727 m)   Wt 76.9 kg (169 lb 8.5 oz)   LMP 07/16/2011   BMI 25.78 kg/m²   Constitutional: She appears alert and responsive. She appears well-developed, well-groomed, and well-nourished. No distress.  Breasts: are symmetrical.  Right breast exhibits no inverted nipple, no mass, no nipple discharge, no skin change and no tenderness.  Left breast exhibits no inverted nipple, no mass, no nipple discharge, no skin change and no tenderness.  Abdominal: Soft. She exhibits no distension, hernias or masses. There is no tenderness. No enlargement of liver edge or spleen.  There is no rebound and no guarding.   Genitourinary: Deferred, pap not due, declined gyn exam, no gyn complaints.     Assessment/Plan:   Encounter for gynecological examination (general) (routine) without abnormal findings    Encounter to establish care  -     Ambulatory referral/consult to Internal Medicine; Future; Expected date: 11/25/2020    Breast cancer screening, high risk patient  -     Mammo Digital Diagnostic Bilat with Chris; Future; Expected date: 11/18/2020      Follow up in 1 year.      "

## 2020-12-02 NOTE — TELEPHONE ENCOUNTER
Attempted to contact patient. Left message to call clinic back to schedule follow up with Teressa Subramanian NP in 4-6 weeks.   
no

## 2021-01-03 ENCOUNTER — CLINICAL SUPPORT (OUTPATIENT)
Dept: URGENT CARE | Facility: CLINIC | Age: 56
End: 2021-01-03
Payer: OTHER GOVERNMENT

## 2021-01-03 DIAGNOSIS — Z20.822 ENCOUNTER FOR LABORATORY TESTING FOR COVID-19 VIRUS: Primary | ICD-10-CM

## 2021-01-03 LAB
CTP QC/QA: YES
SARS-COV-2 RDRP RESP QL NAA+PROBE: NEGATIVE

## 2021-01-03 PROCEDURE — U0002 COVID-19 LAB TEST NON-CDC: HCPCS | Mod: QW,S$GLB,, | Performed by: PHYSICIAN ASSISTANT

## 2021-01-03 PROCEDURE — U0002: ICD-10-PCS | Mod: QW,S$GLB,, | Performed by: PHYSICIAN ASSISTANT

## 2021-01-04 ENCOUNTER — PATIENT MESSAGE (OUTPATIENT)
Dept: ADMINISTRATIVE | Facility: HOSPITAL | Age: 56
End: 2021-01-04

## 2021-01-27 ENCOUNTER — CLINICAL SUPPORT (OUTPATIENT)
Dept: URGENT CARE | Facility: CLINIC | Age: 56
End: 2021-01-27
Payer: OTHER GOVERNMENT

## 2021-01-27 DIAGNOSIS — Z03.818 ENCOUNTER FOR OBSERVATION FOR SUSPECTED EXPOSURE TO OTHER BIOLOGICAL AGENTS RULED OUT: Primary | ICD-10-CM

## 2021-01-27 DIAGNOSIS — U07.1 COVID-19 VIRUS DETECTED: ICD-10-CM

## 2021-01-27 LAB
CTP QC/QA: YES
SARS-COV-2 RDRP RESP QL NAA+PROBE: POSITIVE

## 2021-01-27 PROCEDURE — 99211 OFF/OP EST MAY X REQ PHY/QHP: CPT | Mod: S$GLB,CS,, | Performed by: FAMILY MEDICINE

## 2021-01-27 PROCEDURE — U0002: ICD-10-PCS | Mod: QW,S$GLB,, | Performed by: FAMILY MEDICINE

## 2021-01-27 PROCEDURE — 99211 PR OFFICE/OUTPT VISIT, EST, LEVL I: ICD-10-PCS | Mod: S$GLB,CS,, | Performed by: FAMILY MEDICINE

## 2021-01-27 PROCEDURE — U0002 COVID-19 LAB TEST NON-CDC: HCPCS | Mod: QW,S$GLB,, | Performed by: FAMILY MEDICINE

## 2021-03-16 ENCOUNTER — PATIENT MESSAGE (OUTPATIENT)
Dept: RHEUMATOLOGY | Facility: CLINIC | Age: 56
End: 2021-03-16

## 2021-03-17 ENCOUNTER — PATIENT MESSAGE (OUTPATIENT)
Dept: RHEUMATOLOGY | Facility: CLINIC | Age: 56
End: 2021-03-17

## 2021-04-06 ENCOUNTER — PATIENT MESSAGE (OUTPATIENT)
Dept: ADMINISTRATIVE | Facility: HOSPITAL | Age: 56
End: 2021-04-06

## 2021-04-21 ENCOUNTER — OFFICE VISIT (OUTPATIENT)
Dept: FAMILY MEDICINE | Facility: CLINIC | Age: 56
End: 2021-04-21
Payer: OTHER GOVERNMENT

## 2021-04-21 ENCOUNTER — LAB VISIT (OUTPATIENT)
Dept: LAB | Facility: HOSPITAL | Age: 56
End: 2021-04-21
Attending: INTERNAL MEDICINE
Payer: OTHER GOVERNMENT

## 2021-04-21 VITALS
DIASTOLIC BLOOD PRESSURE: 80 MMHG | SYSTOLIC BLOOD PRESSURE: 126 MMHG | WEIGHT: 162.13 LBS | HEIGHT: 68 IN | BODY MASS INDEX: 24.57 KG/M2

## 2021-04-21 DIAGNOSIS — E04.1 THYROID NODULE: ICD-10-CM

## 2021-04-21 DIAGNOSIS — Z85.3 HISTORY OF BREAST CANCER: ICD-10-CM

## 2021-04-21 DIAGNOSIS — M19.049 HAND ARTHRITIS: ICD-10-CM

## 2021-04-21 DIAGNOSIS — R91.1 PULMONARY NODULE: ICD-10-CM

## 2021-04-21 DIAGNOSIS — Z00.00 WELLNESS EXAMINATION: Primary | ICD-10-CM

## 2021-04-21 DIAGNOSIS — M83.9 VITAMIN D DEFICIENT OSTEOMALACIA: ICD-10-CM

## 2021-04-21 DIAGNOSIS — M79.7 FIBROMYALGIA: ICD-10-CM

## 2021-04-21 DIAGNOSIS — Z00.00 WELLNESS EXAMINATION: ICD-10-CM

## 2021-04-21 LAB
ALBUMIN SERPL BCP-MCNC: 4.4 G/DL (ref 3.5–5.2)
ALP SERPL-CCNC: 63 U/L (ref 55–135)
ALT SERPL W/O P-5'-P-CCNC: 20 U/L (ref 10–44)
ANION GAP SERPL CALC-SCNC: 13 MMOL/L (ref 8–16)
AST SERPL-CCNC: 22 U/L (ref 10–40)
BASOPHILS # BLD AUTO: 0.03 K/UL (ref 0–0.2)
BASOPHILS NFR BLD: 0.4 % (ref 0–1.9)
BILIRUB SERPL-MCNC: 0.5 MG/DL (ref 0.1–1)
BUN SERPL-MCNC: 12 MG/DL (ref 6–20)
CALCIUM SERPL-MCNC: 10.1 MG/DL (ref 8.7–10.5)
CHLORIDE SERPL-SCNC: 102 MMOL/L (ref 95–110)
CHOLEST SERPL-MCNC: 209 MG/DL (ref 120–199)
CHOLEST/HDLC SERPL: 2.7 {RATIO} (ref 2–5)
CO2 SERPL-SCNC: 26 MMOL/L (ref 23–29)
CREAT SERPL-MCNC: 0.7 MG/DL (ref 0.5–1.4)
DIFFERENTIAL METHOD: NORMAL
EOSINOPHIL # BLD AUTO: 0.1 K/UL (ref 0–0.5)
EOSINOPHIL NFR BLD: 1.7 % (ref 0–8)
ERYTHROCYTE [DISTWIDTH] IN BLOOD BY AUTOMATED COUNT: 13.3 % (ref 11.5–14.5)
EST. GFR  (AFRICAN AMERICAN): >60 ML/MIN/1.73 M^2
EST. GFR  (NON AFRICAN AMERICAN): >60 ML/MIN/1.73 M^2
GLUCOSE SERPL-MCNC: 88 MG/DL (ref 70–110)
HCT VFR BLD AUTO: 42 % (ref 37–48.5)
HDLC SERPL-MCNC: 77 MG/DL (ref 40–75)
HDLC SERPL: 36.8 % (ref 20–50)
HGB BLD-MCNC: 13.8 G/DL (ref 12–16)
IMM GRANULOCYTES # BLD AUTO: 0.02 K/UL (ref 0–0.04)
IMM GRANULOCYTES NFR BLD AUTO: 0.2 % (ref 0–0.5)
LDLC SERPL CALC-MCNC: 123.8 MG/DL (ref 63–159)
LYMPHOCYTES # BLD AUTO: 2.2 K/UL (ref 1–4.8)
LYMPHOCYTES NFR BLD: 25.7 % (ref 18–48)
MCH RBC QN AUTO: 29.7 PG (ref 27–31)
MCHC RBC AUTO-ENTMCNC: 32.9 G/DL (ref 32–36)
MCV RBC AUTO: 90 FL (ref 82–98)
MONOCYTES # BLD AUTO: 0.7 K/UL (ref 0.3–1)
MONOCYTES NFR BLD: 8.6 % (ref 4–15)
NEUTROPHILS # BLD AUTO: 5.3 K/UL (ref 1.8–7.7)
NEUTROPHILS NFR BLD: 63.4 % (ref 38–73)
NONHDLC SERPL-MCNC: 132 MG/DL
NRBC BLD-RTO: 0 /100 WBC
PLATELET # BLD AUTO: 348 K/UL (ref 150–450)
PMV BLD AUTO: 10.6 FL (ref 9.2–12.9)
POTASSIUM SERPL-SCNC: 4.6 MMOL/L (ref 3.5–5.1)
PROT SERPL-MCNC: 8.2 G/DL (ref 6–8.4)
RBC # BLD AUTO: 4.65 M/UL (ref 4–5.4)
SODIUM SERPL-SCNC: 141 MMOL/L (ref 136–145)
TRIGL SERPL-MCNC: 41 MG/DL (ref 30–150)
TSH SERPL DL<=0.005 MIU/L-ACNC: 1.06 UIU/ML (ref 0.4–4)
WBC # BLD AUTO: 8.36 K/UL (ref 3.9–12.7)

## 2021-04-21 PROCEDURE — 85025 COMPLETE CBC W/AUTO DIFF WBC: CPT | Performed by: INTERNAL MEDICINE

## 2021-04-21 PROCEDURE — 36415 COLL VENOUS BLD VENIPUNCTURE: CPT | Mod: PN | Performed by: INTERNAL MEDICINE

## 2021-04-21 PROCEDURE — 99396 PR PREVENTIVE VISIT,EST,40-64: ICD-10-PCS | Mod: S$PBB,,, | Performed by: INTERNAL MEDICINE

## 2021-04-21 PROCEDURE — 99396 PREV VISIT EST AGE 40-64: CPT | Mod: S$PBB,,, | Performed by: INTERNAL MEDICINE

## 2021-04-21 PROCEDURE — 99999 PR PBB SHADOW E&M-EST. PATIENT-LVL III: CPT | Mod: PBBFAC,,, | Performed by: INTERNAL MEDICINE

## 2021-04-21 PROCEDURE — 80053 COMPREHEN METABOLIC PANEL: CPT | Performed by: INTERNAL MEDICINE

## 2021-04-21 PROCEDURE — 84443 ASSAY THYROID STIM HORMONE: CPT | Performed by: INTERNAL MEDICINE

## 2021-04-21 PROCEDURE — 99999 PR PBB SHADOW E&M-EST. PATIENT-LVL III: ICD-10-PCS | Mod: PBBFAC,,, | Performed by: INTERNAL MEDICINE

## 2021-04-21 PROCEDURE — 80061 LIPID PANEL: CPT | Performed by: INTERNAL MEDICINE

## 2021-04-21 PROCEDURE — 99213 OFFICE O/P EST LOW 20 MIN: CPT | Mod: PBBFAC,PN | Performed by: INTERNAL MEDICINE

## 2021-04-21 PROCEDURE — 82306 VITAMIN D 25 HYDROXY: CPT | Performed by: INTERNAL MEDICINE

## 2021-04-22 ENCOUNTER — PATIENT MESSAGE (OUTPATIENT)
Dept: FAMILY MEDICINE | Facility: CLINIC | Age: 56
End: 2021-04-22

## 2021-04-22 DIAGNOSIS — Z85.3 HISTORY OF BREAST CANCER: ICD-10-CM

## 2021-04-22 DIAGNOSIS — R91.1 PULMONARY NODULE: Primary | ICD-10-CM

## 2021-04-22 DIAGNOSIS — R91.1 SOLITARY PULMONARY NODULE: ICD-10-CM

## 2021-04-22 DIAGNOSIS — E04.1 THYROID NODULE: ICD-10-CM

## 2021-04-22 LAB — 25(OH)D3+25(OH)D2 SERPL-MCNC: 50 NG/ML (ref 30–96)

## 2021-04-30 ENCOUNTER — HOSPITAL ENCOUNTER (OUTPATIENT)
Dept: RADIOLOGY | Facility: HOSPITAL | Age: 56
Discharge: HOME | End: 2021-04-30
Attending: INTERNAL MEDICINE
Payer: OTHER GOVERNMENT

## 2021-04-30 DIAGNOSIS — E04.1 THYROID NODULE: ICD-10-CM

## 2021-04-30 DIAGNOSIS — Z85.3 HISTORY OF BREAST CANCER: ICD-10-CM

## 2021-04-30 DIAGNOSIS — R91.1 SOLITARY PULMONARY NODULE: ICD-10-CM

## 2021-04-30 PROCEDURE — 71250 CT THORAX DX C-: CPT | Mod: 26,,, | Performed by: RADIOLOGY

## 2021-04-30 PROCEDURE — 76536 US EXAM OF HEAD AND NECK: CPT | Mod: TC,PO

## 2021-04-30 PROCEDURE — 76536 US SOFT TISSUE HEAD NECK THYROID: ICD-10-PCS | Mod: 26,,, | Performed by: RADIOLOGY

## 2021-04-30 PROCEDURE — 71250 CT THORAX DX C-: CPT | Mod: TC,PO

## 2021-04-30 PROCEDURE — 76536 US EXAM OF HEAD AND NECK: CPT | Mod: 26,,, | Performed by: RADIOLOGY

## 2021-04-30 PROCEDURE — 71250 CT CHEST WITHOUT CONTRAST: ICD-10-PCS | Mod: 26,,, | Performed by: RADIOLOGY

## 2021-05-07 ENCOUNTER — PATIENT MESSAGE (OUTPATIENT)
Dept: FAMILY MEDICINE | Facility: CLINIC | Age: 56
End: 2021-05-07

## 2021-05-10 ENCOUNTER — PATIENT MESSAGE (OUTPATIENT)
Dept: RESEARCH | Facility: HOSPITAL | Age: 56
End: 2021-05-10

## 2021-06-01 ENCOUNTER — PATIENT MESSAGE (OUTPATIENT)
Dept: FAMILY MEDICINE | Facility: CLINIC | Age: 56
End: 2021-06-01

## 2021-06-01 RX ORDER — EPINEPHRINE 0.3 MG/.3ML
1 INJECTION SUBCUTANEOUS ONCE
Qty: 2 EACH | Refills: 0 | Status: SHIPPED | OUTPATIENT
Start: 2021-06-01 | End: 2022-06-28

## 2021-08-10 ENCOUNTER — HOSPITAL ENCOUNTER (EMERGENCY)
Facility: HOSPITAL | Age: 56
Discharge: HOME | End: 2021-08-10
Attending: EMERGENCY MEDICINE
Payer: OTHER GOVERNMENT

## 2021-08-10 VITALS
HEIGHT: 68 IN | WEIGHT: 160 LBS | BODY MASS INDEX: 24.25 KG/M2 | HEART RATE: 84 BPM | OXYGEN SATURATION: 96 % | RESPIRATION RATE: 20 BRPM | TEMPERATURE: 99 F | DIASTOLIC BLOOD PRESSURE: 75 MMHG | SYSTOLIC BLOOD PRESSURE: 128 MMHG

## 2021-08-10 DIAGNOSIS — S92.355A NONDISPLACED FRACTURE OF FIFTH METATARSAL BONE, LEFT FOOT, INITIAL ENCOUNTER FOR CLOSED FRACTURE: Primary | ICD-10-CM

## 2021-08-10 DIAGNOSIS — S99.922A FOOT INJURY, LEFT, INITIAL ENCOUNTER: ICD-10-CM

## 2021-08-10 PROCEDURE — 29515 APPLICATION SHORT LEG SPLINT: CPT | Mod: RT

## 2021-08-10 PROCEDURE — 99283 EMERGENCY DEPT VISIT LOW MDM: CPT | Mod: 25

## 2021-08-10 RX ORDER — CALCIUM CARBONATE 160(400)MG
1 TABLET,CHEWABLE ORAL ONCE
Qty: 1 EACH | Refills: 0 | Status: SHIPPED | OUTPATIENT
Start: 2021-08-10 | End: 2021-08-10

## 2021-08-12 ENCOUNTER — PATIENT MESSAGE (OUTPATIENT)
Dept: RHEUMATOLOGY | Facility: CLINIC | Age: 56
End: 2021-08-12

## 2021-08-12 ENCOUNTER — OFFICE VISIT (OUTPATIENT)
Dept: PODIATRY | Facility: CLINIC | Age: 56
End: 2021-08-12
Payer: OTHER GOVERNMENT

## 2021-08-12 VITALS — RESPIRATION RATE: 16 BRPM | WEIGHT: 160 LBS | HEIGHT: 68 IN | BODY MASS INDEX: 24.25 KG/M2

## 2021-08-12 DIAGNOSIS — S99.922A INJURY OF LEFT FOOT, INITIAL ENCOUNTER: ICD-10-CM

## 2021-08-12 DIAGNOSIS — S92.355A NONDISPLACED FRACTURE OF FIFTH METATARSAL BONE, LEFT FOOT, INITIAL ENCOUNTER FOR CLOSED FRACTURE: Primary | ICD-10-CM

## 2021-08-12 DIAGNOSIS — M79.672 LEFT FOOT PAIN: ICD-10-CM

## 2021-08-12 PROCEDURE — 99203 OFFICE O/P NEW LOW 30 MIN: CPT | Mod: S$GLB,,, | Performed by: PODIATRIST

## 2021-08-12 PROCEDURE — 99203 PR OFFICE/OUTPT VISIT, NEW, LEVL III, 30-44 MIN: ICD-10-PCS | Mod: S$GLB,,, | Performed by: PODIATRIST

## 2021-10-22 NOTE — PROGRESS NOTES
Date of service: 3/9/2020  Referring provider: Dr. Evelyn Kumar    Subjective:      Chief complaint: Consult (occipital neuralgia )       Patient ID: Sonido Loera is a 54 y.o. lady with history of breast cancer, fibromyalgia presenting for new patient evaluation of headache    History of Present Illness    ORIGINAL HEADACHE HISTORY - 03/09/2020  Age at onset and course over time:  The headaches began a couple of years ago, or at least that is when her  started noticing them. She has several headache types. The main sensation she reports today is occipital discomfort, when she is resting on a pillow. It will hurt during the day if she doesn't adjust her head at night.     Questionable light sensitivity and lack of appetite during pain  She also carries a diagnosis of vestibular migraine - if she has several days with no sleep, then this will trigger days of vertigo.     Car sick as a child. She does no report a migraine history earlier in life.      Location: occipital   Quality:  [x] pressure [] tight [] throbbing [] sharp [] stabbing   Severity:  Current 2, best 2, worst 8  Duration: hours  Frequency:  Daily  Headaches awaken at night?:  4 a week  Worst time of day:  Upon wakening, morning time, overnight  Associated with: [] photophobia []  phonophobia [] osmophobia [] blurred vision  [] double vision [] loss of appetite [] nausea [] vomiting [] dizziness [] vertigo  [] tinnitus [] irritability [] sinus pressure [x] problems with concentration   [x] neck tightness   Alleviated by:  [] sleep [] darkness [] massage [] heat [] ice [] medication  Exacerbated by:  [] fatigue [] light [] noise [] smells [] coughing [] sneezing  [] bending over [] ovulation [] menses [] alcohol [x] change in weather possibly  []  stress  Ipsilateral autonomic: [] nasal congestion [] lacrimation [] ptosis [] injection [] edema [] foreign body sensation [] ear fullness   ICP:  [] transient visual obscurations  [x] tinnitus -  steady  [x] positional headache  [] non-positional   Sleep habits:  Caffeine intake: 2- 3 cups of coffee  Gyn status (if female):  She is menopausal  MIDAS:     Current acute treatment:  Aleve or ibuprofen 7 days a week - for joint pain     Current prevention:  Naltrexone - for fibromyalgia     Previously tried/failed acute treatment:   Tylenol    Previously tried/failed preventative treatment:  topiramate - dizzy   trokendi - dizzy   Gabapentin cause dizziness    Review of patient's allergies indicates:   Allergen Reactions    Gabapentin Other (See Comments)     dizzy    Tamoxifen analogues Other (See Comments)     Unclear thinking, shoulder pain    Adhesive Rash     Rash on contact     Current Outpatient Medications   Medication Sig Dispense Refill    ascorbic acid (VITAMIN C) 500 MG tablet Take 500 mg by mouth once daily.      b complex vitamins capsule Take 1 capsule by mouth once daily.      cetirizine (ZYRTEC) 10 MG tablet Take 10 mg by mouth once daily.      loratadine (CLARITIN) 10 mg tablet Take 10 mg by mouth once daily.      multivitamin (ONE DAILY MULTIVITAMIN) per tablet Take 1 tablet by mouth once daily.      naltrexone capsule Take 1 capsule (4.5 mg total) by mouth every evening. 90 capsule 3    naproxen sodium (ANAPROX) 220 MG tablet Take 220 mg by mouth every 12 (twelve) hours.      albuterol (PROVENTIL/VENTOLIN HFA) 90 mcg/actuation inhaler Inhale 1-2 puffs into the lungs every 6 (six) hours as needed for Wheezing or Shortness of Breath. (Patient not taking: Reported on 3/9/2020) 1 Inhaler 0    azithromycin (Z-WILLIAM) 250 MG tablet Take first 2 tablets together, then 1 every day until finished for total of five day course. 6 tablet 0    benzonatate (TESSALON) 100 MG capsule Take 1 capsule (100 mg total) by mouth 3 (three) times daily as needed for Cough. (Patient not taking: Reported on 3/9/2020) 20 capsule 0    NON FORMULARY MEDICATION Take 1 tablet by mouth once daily.        No  current facility-administered medications for this visit.        Past Medical History  Past Medical History:   Diagnosis Date    Allergic rhinitis due to pollen     Breast cancer     left lumpectomy    Colon polyp     Encounter for blood transfusion     after miscarriage     Fibromyalgia     Headache     Hernia of abdominal wall     Joint pain     Liver cyst     Thyroid nodule     nodule    Umbilical hernia        Past Surgical History  Past Surgical History:   Procedure Laterality Date    BREAST LUMPECTOMY Left     with radiation and chemotherapy     SECTION      CHOLECYSTECTOMY      COLONOSCOPY  2015    Dr. Davenport, sent for scanning: one colon polyps removed, tortuous colon, large internal hemorrhoids; biopsy: tubular adenoma, repeat in 5 years for surveillance    ESOPHAGOGASTRODUODENOSCOPY N/A 2019    Procedure: EGD (ESOPHAGOGASTRODUODENOSCOPY);  Surgeon: Michael Hebert Jr., MD;  Location: [facility];  Service: Endoscopy;  Laterality: N/A;    ROBOT-ASSISTED LAPAROSCOPIC REPAIR OF INCISIONAL HERNIA N/A 10/7/2019    Procedure: ROBOTIC REPAIR, HERNIA, INCISIONAL;  Surgeon: Alexis Gonzalez MD;  [facility];  Service: General;  Laterality: N/A;    VEIN SURGERY         Family History  Family History   Problem Relation Age of Onset    Rhinitis Daughter     Breast cancer Mother 82    Breast cancer Maternal Aunt 78        mother's twin    Liver cancer Paternal Aunt     Cirrhosis Maternal Grandmother     Heart disease Father     Chronic infections Sister     No Known Problems Brother     AVM Sister     Urticaria Neg Hx     Immunodeficiency Neg Hx     Eczema Neg Hx     Asthma Neg Hx     Angioedema Neg Hx     Colon cancer Neg Hx     Crohn's disease Neg Hx     Ulcerative colitis Neg Hx     Esophageal cancer Neg Hx        Social History  Social History     Socioeconomic History    Marital status:      Spouse name: Not on file    Number of  children: Not on file    Years of education: Not on file    Highest education level: Not on file   Occupational History    Not on file   Social Needs    Financial resource strain: Not hard at all    Food insecurity:     Worry: Never true     Inability: Never true    Transportation needs:     Medical: No     Non-medical: No   Tobacco Use    Smoking status: Former Smoker     Packs/day: 1.00     Years: 3.00     Pack years: 3.00     Types: Cigarettes     Last attempt to quit: 1983     Years since quittin.1    Smokeless tobacco: Never Used   Substance and Sexual Activity    Alcohol use: Yes     Alcohol/week: 1.0 standard drinks     Types: 1 Glasses of wine per week     Frequency: Monthly or less     Drinks per session: 1 or 2     Binge frequency: Never     Comment: 1-2 times a year    Drug use: Never    Sexual activity: Not on file   Lifestyle    Physical activity:     Days per week: 2 days     Minutes per session: 30 min    Stress: Only a little   Relationships    Social connections:     Talks on phone: Not on file     Gets together: Not on file     Attends Latter-day service: Not on file     Active member of club or organization: Not on file     Attends meetings of clubs or organizations: Not on file     Relationship status: Not on file   Other Topics Concern    Not on file   Social History Narrative    Not on file        Review of Systems  14-point review of systems as follows:   No check immanuel indicates NEGATIVE response   Constitutional: [] weight loss, [] change to appetite   Eyes: [] change in vision, [] double vision   Ears, nose, mouth, throat: [] frequent nose bleeds, [x] ringing in the ears   Respiratory: [] cough, [] wheezing   Cardiovascular: [] chest pain, [] palpitations   Gastrointestinal: [] jaundice, [] nausea/vomiting   Genitourinary: [] incontinence, [] burning with urination   Hematologic/lymphatic: [] easy bruising/bleeding, [] night sweats   Neurological: [] numbness, []  weakness   Endocrine: [] fatigue, [x] heat/cold intolerance   Allergy/Immunologic: [] fevers, [] chills   Musculoskeletal: [] muscle pain, [x] joint pain   Psychiatric: [] thoughts of harming self/others, [] depression   Integumentary: [x] rashes, [] sores that do not heal     Objective:        Vitals:    03/09/20 1348   BP: (!) 144/82   Pulse: 69   Resp: 16     Body mass index is 26.3 kg/m².    03/09/2020  Constitutional: appears in no acute distress, well-developed, well-nourished     Eyes: normal conjunctiva, PERRLA, optic discs are flat and sharp bilaterally     Ears, nose, mouth, throat: external appearance of ears and nose normal, hearing intact     Cardiovascular: regular rate and rhythm, no murmurs appreciated    Respiratory: unlabored respirations, breath sounds normal bilaterally    Gastrointestinal: no visible abdominal masses, no guarding, no visible hernia    Musculoskeletal: normal tone in all four extremities. No abnormal movements. No pronator drift. No orbit. Symmetric finger tapping.      Spine:   CERVICAL SPINE:  ROM: normal   MUSCLE SPASM:  Mild, diffuse bilateral   FACET LOADING:  Mild, bilateral  SPURLING: no  SEBASTIAN / IMTIAZ tender:  Bilateral     Psychiatric: normal judgment and insight. Oriented to person, place, and time.     Neurologic:   Cortical functions: recent and remote memory intact, normal attention span and concentration, speech fluent, adequate fund of knowledge   Cranial nerves: visual fields full, PERRLA, EOMI, symmetric facial strength, hearing intact, palate elevates symmetrically, shoulder shrug 5/5, tongue protrudes midline   Reflexes: 2+ in the upper and lower extremities, no Moreno  Sensation: intact to temperature throughout   Coordination: normal finger to nose, heel to shin    Data Review:     I have personally reviewed the referring provider's notes, labs, & imaging made available to me today.      RADIOLOGY STUDIES:  I have personally reviewed the pertinent images  performed.     CT soft tissue neck with contrast 07/19/2019  Reviewed the sagittal spine images which show lack of any significant cervical spondylosis  The do show a loss of cervical lordosis    Lab Results   Component Value Date     10/07/2019    K 4.0 10/07/2019     10/07/2019    CO2 30 (H) 10/07/2019    BUN 13 10/07/2019    CREATININE 0.7 10/07/2019    GLU 87 10/07/2019    AST 19 10/07/2019    ALT 23 10/07/2019    ALBUMIN 4.4 10/07/2019    PROT 7.5 10/07/2019    BILITOT 0.4 10/07/2019    CHOL 226 (H) 07/19/2019    HDL 76 (H) 07/19/2019    LDLCALC 141.8 07/19/2019    TRIG 41 07/19/2019       Lab Results   Component Value Date    WBC 6.46 10/07/2019    HGB 12.7 10/07/2019    HCT 39.5 10/07/2019    MCV 90 10/07/2019     10/07/2019       Lab Results   Component Value Date    TSH 0.916 09/12/2019           Assessment & Plan:       Problem List Items Addressed This Visit        Orthopedic    Cervical myofascial pain syndrome - Primary    Overview     Reactive to either very mild facet arthropathy versus fibromyalgia versus mild component of migraine  Begin with conservative regimen of cervical myofascial release, cervical trigger point injections and occipital nerve block, magnesium, if not making progress consider the addition of SNRI, more targeted cervical imaging to guide a possible procedure    No clear migraine history though she did get motion sick as a child, may be experiencing visual auras (flashing lights) and intermittently has vestibular migraines         Relevant Orders    Ambulatory consult to Physical Therapy    Bilateral occipital neuralgia    Overview     Occipital discomfort/allodynia mainly in the dependent position  Secondary to upper cervical spine muscle spasm  Return for trigger point injections and nerve blocks                   Please call our clinic at [phone] or send a message to Dr. Singh on the [employer] portal if there are any changes to the plan described below,  for example,if you are not contacted for the requested tests, referral(s) within one week, if you are unable to receive the medications prescribed, or if you feel you need to change the treatment course for any reason.     TESTING:  -- none     REFERRALS:  Refer to physical therapy for cervical myofascial release   [facility]  Yudy Cruz [facility]  [phone]    PREVENTION (use daily regardless of headache):  -- start magnesium in ONE of the following preparations -    1. Magnesium oxide 800mg daily (the most common over the counter kind, may causes loose stools)   2. Magnesium citrate 400-500mg daily (harder to find, but more neutral on the bowels)   3. Magnesium glycinate 400mg daily (hardest to find, look online, but most bowel-neutral, best absorbed)   -- can look for natural calm magensium powder     AS-NEEDED TREATMENT (use total no more than 10 days per month unless otherwise stated):  -- seek approval for cervical trigger point injections and occipital nerve block (with steroids)    Follow up in about 2 weeks (around 3/23/2020) for trigger point injections.    Na Singh MD           None known

## 2021-11-30 ENCOUNTER — TELEPHONE (OUTPATIENT)
Dept: OBSTETRICS AND GYNECOLOGY | Facility: CLINIC | Age: 56
End: 2021-11-30
Payer: OTHER GOVERNMENT

## 2021-11-30 DIAGNOSIS — Z85.3 HISTORY OF BREAST CANCER: Primary | ICD-10-CM

## 2022-03-10 ENCOUNTER — PATIENT MESSAGE (OUTPATIENT)
Dept: RHEUMATOLOGY | Facility: CLINIC | Age: 57
End: 2022-03-10
Payer: OTHER GOVERNMENT

## 2022-03-24 ENCOUNTER — PATIENT OUTREACH (OUTPATIENT)
Dept: ADMINISTRATIVE | Facility: HOSPITAL | Age: 57
End: 2022-03-24
Payer: OTHER GOVERNMENT

## 2022-03-24 NOTE — PROGRESS NOTES
Non-compliant report chart audits for CERVICAL CANCER SCREENING. Chart review completed for  test overdue (mammograms, Colonoscopies, pap smears, DM labs, and/or EYE EXAMs)      Care Everywhere and media, updates requested and reviewed.      [employer] and [email or web address] reviewed.      Outreach to patient in reference to cervical cancer screening     pap/hpv uploaded    Outreach:  Cervical cancer screening

## 2022-04-25 PROBLEM — Z17.0 ER+ (ESTROGEN RECEPTOR POSITIVE STATUS): Status: ACTIVE | Noted: 2022-04-25

## 2022-04-25 PROBLEM — Z98.890 S/P LUMPECTOMY, LEFT BREAST: Status: ACTIVE | Noted: 2022-04-25

## 2022-04-25 PROBLEM — Z92.21 S/P CHEMOTHERAPY, TIME SINCE GREATER THAN 12 WEEKS: Status: ACTIVE | Noted: 2022-04-25

## 2022-04-25 PROBLEM — C50.912 MALIGNANT NEOPLASM OF LEFT BREAST IN FEMALE, ESTROGEN RECEPTOR POSITIVE: Status: ACTIVE | Noted: 2022-04-25

## 2022-04-25 PROBLEM — Z92.3 S/P RADIOTHERAPY: Status: ACTIVE | Noted: 2022-04-25

## 2022-04-25 PROBLEM — Z17.0 MALIGNANT NEOPLASM OF LEFT BREAST IN FEMALE, ESTROGEN RECEPTOR POSITIVE: Status: ACTIVE | Noted: 2022-04-25

## 2022-04-25 NOTE — PROGRESS NOTES
[facility] Hematolgy/Oncology  History & Physical    Subjective:      Patient ID:   NAME: Sonido oLera : 1965     56 y.o. female    Referring Doc: Evans Baker (PCp)  Other Physicians: Mary Martin (Gyn); ARIANNE Martinez; Sowmya Kumar (Rheum); Na Singh (neuro)        Chief Complaint: left breast cancer      HPI:  56 y.o. female with diagnosis of left breast cancer back in  who has been referred by Summer for evaluation by medical hematology/oncology. She had lumpectomy in  while living in [place], treated with radiation and chemotherapy followed oral antihormone therapy with tamoxifen. She has been getting yearly mammograms. She is here by herself. She has intermittent bouts of bone pains, mostly RLE anterior shins and bilateral thighs. Breathing ok. No CP, SOB, HA's or N/V. No weight loss.     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    Former smoker as teenager for only 2-3 yrs; only occasional wine    Discussed covid19 precautions; she had covid in 2021; she has not been vaccinated; she still has some residual taste and smell deficiencies              ROS:   GEN: normal without any fever, night sweats or weight loss; occ bone aches  HEENT: normal with no HA's, sore throat, stiff neck, changes in vision  CV: normal with no CP, SOB, PND, HANEY or orthopnea  PULM: normal with no SOB, cough, hemoptysis, sputum or pleuritic pain  GI: normal with no abdominal pain, nausea, vomiting, constipation, diarrhea, melanotic stools, BRBPR, or hematemesis  : normal with no hematuria, dysuria  BREAST: normal with no mass, discharge, pain  SKIN: normal with no rash, erythema, bruising, or swelling       Past Medical/Surgical History:  Past Medical History:   Diagnosis Date    Allergic rhinitis due to pollen     Breast cancer     left lumpectomy invasive ductal (dx [place]) tamo    Colon polyp     Encounter for blood transfusion     after miscarriage     ER+ (estrogen  receptor positive status) 2022    Erosive osteoarthritis 2020    Fibromyalgia     Headache     Hernia of abdominal wall     Joint pain     Liver cyst     Malignant neoplasm of left breast in female, estrogen receptor positive 2022    S/P chemotherapy, time since greater than 12 weeks 2022    S/P lumpectomy, left breast () 2022    S/P radiotherapy 2022    Thyroid nodule     nodule    Umbilical hernia      Past Surgical History:   Procedure Laterality Date    BREAST LUMPECTOMY Left     with radiation and chemotherapy     SECTION      1    CHOLECYSTECTOMY      COLONOSCOPY  2015    Dr. Davenport, sent for scanning: one colon polyps removed, tortuous colon, large internal hemorrhoids; biopsy: tubular adenoma, repeat in 5 years for surveillance    ESOPHAGOGASTRODUODENOSCOPY N/A 2019    Procedure: EGD (ESOPHAGOGASTRODUODENOSCOPY);  Surgeon: Michael Hebert Jr., MD;  Location: [facility] ENDO;  Service: Endoscopy;  Laterality: N/A;    ROBOT-ASSISTED LAPAROSCOPIC REPAIR OF INCISIONAL HERNIA N/A 10/7/2019    Procedure: ROBOTIC REPAIR, HERNIA, INCISIONAL;  Surgeon: Alexis Gonzalez MD;  Location: [facility] OR;  Service: General;  Laterality: N/A;    VEIN SURGERY Right     Varicose vein sclerotherapy         Allergies:  Review of patient's allergies indicates:   Allergen Reactions    Gabapentin Other (See Comments)     dizzy    Tamoxifen analogues Other (See Comments)     Unclear thinking, shoulder pain    Adhesive Rash     Rash on contact       Social/Family History:  Social History     Socioeconomic History    Marital status:    Tobacco Use    Smoking status: Former Smoker     Packs/day: 1.00     Years: 3.00     Pack years: 3.00     Types: Cigarettes     Quit date: 1983     Years since quittin.3    Smokeless tobacco: Never Used    Tobacco comment: Very brief part of late teens   Substance and Sexual Activity    Alcohol use: Not Currently  "    Alcohol/week: 1.0 standard drink     Types: 1 Glasses of wine per week     Comment: 1-2 times a year    Drug use: Never    Sexual activity: Yes     Partners: Male     Birth control/protection: Post-menopausal, None     Family History   Problem Relation Age of Onset    Rhinitis Daughter     Breast cancer Mother 82    Breast cancer Maternal Aunt 78        mother's twin    Liver cancer Paternal Aunt     Cirrhosis Maternal Grandmother     Heart disease Father     Chronic infections Sister     No Known Problems Brother     AVM Sister     Urticaria Neg Hx     Immunodeficiency Neg Hx     Eczema Neg Hx     Asthma Neg Hx     Angioedema Neg Hx     Colon cancer Neg Hx     Crohn's disease Neg Hx     Ulcerative colitis Neg Hx     Esophageal cancer Neg Hx          Medications:    Current Outpatient Medications:     ascorbic acid (VITAMIN C) 500 MG tablet, Take 500 mg by mouth once daily., Disp: , Rfl:     b complex vitamins capsule, Take 1 capsule by mouth once daily., Disp: , Rfl:     EPINEPHrine (EPIPEN) 0.3 mg/0.3 mL AtIn, Inject 0.3 mLs (0.3 mg total) into the muscle once. for 1 dose, Disp: 2 each, Rfl: 0    multivitamin (THERAGRAN) per tablet, Take 1 tablet by mouth once daily., Disp: , Rfl:     naproxen sodium (ANAPROX) 220 MG tablet, Take 220 mg by mouth every 12 (twelve) hours., Disp: , Rfl:     traMADoL (ULTRAM) 50 mg tablet, 1 tab PO as needed for severe migraine. No more than 10 days per month., Disp: 10 each, Rfl: 1      Pathology:  Cancer Staging  No matching staging information was found for the patient.      Objective:   Vitals:  Blood pressure (!) 154/88, pulse 73, resp. rate 18, height 5' 8" (1.727 m), weight 77.1 kg (170 lb), last menstrual period 07/16/2011.    Physical Examination:   GEN: no apparent distress, comfortable; AAOx3  HEAD: atraumatic and normocephalic  EYES: no pallor, no icterus, PERRLA  ENT: OMM, no pharyngeal erythema, external ears WNL; no nasal discharge; no " thrush  NECK: no masses, thyroid normal, trachea midline, no LAD/LN's, supple  CV: RRR with no murmur; normal pulse; normal S1 and S2; no pedal edema  CHEST: Normal respiratory effort; CTAB; normal breath sounds; no wheeze or crackles  ABDOM: nontender and nondistended; soft; normal bowel sounds; no rebound/guarding  MUSC/Skeletal: ROM normal; no crepitus; joints normal; no deformities or arthropathy  EXTREM: no clubbing, cyanosis, inflammation or swelling  SKIN: no rashes, lesions, ulcers, petechiae or subcutaneous nodules  : no hay  NEURO: grossly intact; motor/sensory WNL; AAOx3; no tremors  PSYCH: normal mood, affect and behavior  LYMPH: normal cervical, supraclavicular, axillary and groin LN's  Breast: patient declined - recently done by another physician    Labs:   Lab Results   Component Value Date    WBC 8.36 04/21/2021    HGB 13.8 04/21/2021    HCT 42.0 04/21/2021    MCV 90 04/21/2021     04/21/2021    CMP  Sodium   Date Value Ref Range Status   04/21/2021 141 136 - 145 mmol/L Final     Potassium   Date Value Ref Range Status   04/21/2021 4.6 3.5 - 5.1 mmol/L Final     Chloride   Date Value Ref Range Status   04/21/2021 102 95 - 110 mmol/L Final     CO2   Date Value Ref Range Status   04/21/2021 26 23 - 29 mmol/L Final     Glucose   Date Value Ref Range Status   04/21/2021 88 70 - 110 mg/dL Final     BUN   Date Value Ref Range Status   04/21/2021 12 6 - 20 mg/dL Final     Creatinine   Date Value Ref Range Status   04/21/2021 0.7 0.5 - 1.4 mg/dL Final     Calcium   Date Value Ref Range Status   04/21/2021 10.1 8.7 - 10.5 mg/dL Final     Total Protein   Date Value Ref Range Status   04/21/2021 8.2 6.0 - 8.4 g/dL Final     Albumin   Date Value Ref Range Status   04/21/2021 4.4 3.5 - 5.2 g/dL Final     Total Bilirubin   Date Value Ref Range Status   04/21/2021 0.5 0.1 - 1.0 mg/dL Final     Comment:     For infants and newborns, interpretation of results should be based  on gestational age, weight and  in agreement with clinical  observations.    Premature Infant recommended reference ranges:  Up to 24 hours.............<8.0 mg/dL  Up to 48 hours............<12.0 mg/dL  3-5 days..................<15.0 mg/dL  6-29 days.................<15.0 mg/dL       Alkaline Phosphatase   Date Value Ref Range Status   04/21/2021 63 55 - 135 U/L Final     AST   Date Value Ref Range Status   04/21/2021 22 10 - 40 U/L Final     ALT   Date Value Ref Range Status   04/21/2021 20 10 - 44 U/L Final     Anion Gap   Date Value Ref Range Status   04/21/2021 13 8 - 16 mmol/L Final     eGFR if    Date Value Ref Range Status   04/21/2021 >60.0 >60 mL/min/1.73 m^2 Final     eGFR if non    Date Value Ref Range Status   04/21/2021 >60.0 >60 mL/min/1.73 m^2 Final     Comment:     Calculation used to obtain the estimated glomerular filtration  rate (eGFR) is the CKD-EPI equation.            Radiology/Diagnostic Studies:          All lab results and imaging results have been reviewed and discussed with the patient    Assessment:   (1) 56 y.o. female with diagnosis of left breast cancer back in 2009 who has been referred by No ref. provider found for evaluation by medical hematology/oncology. She had lumpectomy in 2009 while living in [place] and was treated with radiation, chemotherapy and oral antihormone therapy with tamoxifen in the past    - previously seen by Dr Sanna Lerner  - s/p lumpectomy  - 2cm invasive ductal carcinoma with no LN involvement  - she has yearly mammogram and had one couple of months ago    (2) Hypercholesterolemia    (3) Cervical myofascial pain syndrome, fibromyalgia, Bilateral occipital neuralgia; erosive osteoarthritis    (4) Abdominal wall hernia, liver cyst, umbilical herna    VISIT DIAGNOSES:              History of breast cancer    S/P lumpectomy, left breast (2009)    S/P radiotherapy    S/P chemotherapy, time since greater than 12 weeks    ER+ (estrogen receptor positive  status)    Malignant neoplasm of left breast in female, estrogen receptor positive, unspecified site of breast            Plan:     PLAN:  1. Set up PEt scan out of abundance of precaution  2. Continue mammogram yearly  3. F/u with PCP, GYN, etc  4. Check up to date labs  RTC in  3-4 weeks can cancel and 12 month keep  Fax note Mary Baker Martin    COVID-19 Discussion:    I had long discussion with patient and any applicable family about the COVID-19 coronavirus epidemic and the recommended precautions with regard to cancer and/or hematology patients. I have re-iterated the CDC recommendations for adequate hand washing, use of hand -like products, and coughing into elbow, etc. In addition, especially for our patients who are on chemotherapy and/or our otherwise immunocompromised patients, I have recommended avoidance of crowds, including movie theaters, restaurants, churches, etc. I have recommended avoidance of any sick or symptomatic family members and/or friends. I have also recommended avoidance of any raw and unwashed food products, and general avoidance of food items that have not been prepared by themselves. The patient has been asked to call us immediately with any symptom developments, issues, questions or other general concerns.         I have explained and the patient understands all of  the current recommendation(s). I have answered all of their questions to the best of my ability and to their complete satisfaction.             Thank you for allowing me to participate in this patient's care. Please call with any questions or concerns.    Electronically signed Juni Nam MD    Answers for HPI/ROS submitted by the patient on 4/25/2022  appetite change : No  unexpected weight change: No  mouth sores: No  visual disturbance: No  cough: Yes  shortness of breath: No  chest pain: No  abdominal pain: No  diarrhea: No  frequency: Yes  back pain: No  rash: No  headaches:  Yes  adenopathy: No  nervous/ anxious: No

## 2022-04-26 ENCOUNTER — LAB VISIT (OUTPATIENT)
Dept: LAB | Facility: HOSPITAL | Age: 57
End: 2022-04-26
Attending: INTERNAL MEDICINE
Payer: OTHER GOVERNMENT

## 2022-04-26 ENCOUNTER — OFFICE VISIT (OUTPATIENT)
Dept: HEMATOLOGY/ONCOLOGY | Facility: CLINIC | Age: 57
End: 2022-04-26
Payer: OTHER GOVERNMENT

## 2022-04-26 VITALS
HEIGHT: 68 IN | WEIGHT: 170 LBS | BODY MASS INDEX: 25.76 KG/M2 | HEART RATE: 73 BPM | DIASTOLIC BLOOD PRESSURE: 88 MMHG | RESPIRATION RATE: 18 BRPM | SYSTOLIC BLOOD PRESSURE: 154 MMHG

## 2022-04-26 DIAGNOSIS — Z98.890 S/P LUMPECTOMY, LEFT BREAST: ICD-10-CM

## 2022-04-26 DIAGNOSIS — Z85.3 HISTORY OF BREAST CANCER: Primary | ICD-10-CM

## 2022-04-26 DIAGNOSIS — Z17.0 MALIGNANT NEOPLASM OF LEFT BREAST IN FEMALE, ESTROGEN RECEPTOR POSITIVE, UNSPECIFIED SITE OF BREAST: ICD-10-CM

## 2022-04-26 DIAGNOSIS — Z92.21 S/P CHEMOTHERAPY, TIME SINCE GREATER THAN 12 WEEKS: ICD-10-CM

## 2022-04-26 DIAGNOSIS — Z17.0 ER+ (ESTROGEN RECEPTOR POSITIVE STATUS): ICD-10-CM

## 2022-04-26 DIAGNOSIS — C50.912 MALIGNANT NEOPLASM OF LEFT BREAST IN FEMALE, ESTROGEN RECEPTOR POSITIVE, UNSPECIFIED SITE OF BREAST: ICD-10-CM

## 2022-04-26 DIAGNOSIS — Z92.3 S/P RADIOTHERAPY: ICD-10-CM

## 2022-04-26 LAB
ALBUMIN SERPL BCP-MCNC: 4.5 G/DL (ref 3.5–5.2)
ALP SERPL-CCNC: 49 U/L (ref 55–135)
ALT SERPL W/O P-5'-P-CCNC: 19 U/L (ref 10–44)
ANION GAP SERPL CALC-SCNC: 8 MMOL/L (ref 8–16)
AST SERPL-CCNC: 19 U/L (ref 10–40)
BASOPHILS # BLD AUTO: 0.02 K/UL (ref 0–0.2)
BASOPHILS NFR BLD: 0.3 % (ref 0–1.9)
BILIRUB SERPL-MCNC: 0.4 MG/DL (ref 0.1–1)
BUN SERPL-MCNC: 13 MG/DL (ref 6–20)
CALCIUM SERPL-MCNC: 9.8 MG/DL (ref 8.7–10.5)
CHLORIDE SERPL-SCNC: 102 MMOL/L (ref 95–110)
CO2 SERPL-SCNC: 31 MMOL/L (ref 23–29)
CREAT SERPL-MCNC: 0.7 MG/DL (ref 0.5–1.4)
DIFFERENTIAL METHOD: NORMAL
EOSINOPHIL # BLD AUTO: 0.2 K/UL (ref 0–0.5)
EOSINOPHIL NFR BLD: 2.7 % (ref 0–8)
ERYTHROCYTE [DISTWIDTH] IN BLOOD BY AUTOMATED COUNT: 13.5 % (ref 11.5–14.5)
EST. GFR  (AFRICAN AMERICAN): >60 ML/MIN/1.73 M^2
EST. GFR  (NON AFRICAN AMERICAN): >60 ML/MIN/1.73 M^2
GLUCOSE SERPL-MCNC: 96 MG/DL (ref 70–110)
HCT VFR BLD AUTO: 39.5 % (ref 37–48.5)
HGB BLD-MCNC: 13 G/DL (ref 12–16)
IMM GRANULOCYTES # BLD AUTO: 0.01 K/UL (ref 0–0.04)
IMM GRANULOCYTES NFR BLD AUTO: 0.2 % (ref 0–0.5)
LYMPHOCYTES # BLD AUTO: 2 K/UL (ref 1–4.8)
LYMPHOCYTES NFR BLD: 31.7 % (ref 18–48)
MCH RBC QN AUTO: 28.7 PG (ref 27–31)
MCHC RBC AUTO-ENTMCNC: 32.9 G/DL (ref 32–36)
MCV RBC AUTO: 87 FL (ref 82–98)
MONOCYTES # BLD AUTO: 0.5 K/UL (ref 0.3–1)
MONOCYTES NFR BLD: 7.8 % (ref 4–15)
NEUTROPHILS # BLD AUTO: 3.6 K/UL (ref 1.8–7.7)
NEUTROPHILS NFR BLD: 57.3 % (ref 38–73)
NRBC BLD-RTO: 0 /100 WBC
PLATELET # BLD AUTO: 271 K/UL (ref 150–450)
PMV BLD AUTO: 9.5 FL (ref 9.2–12.9)
POTASSIUM SERPL-SCNC: 4.6 MMOL/L (ref 3.5–5.1)
PROT SERPL-MCNC: 7.6 G/DL (ref 6–8.4)
RBC # BLD AUTO: 4.53 M/UL (ref 4–5.4)
SODIUM SERPL-SCNC: 141 MMOL/L (ref 136–145)
WBC # BLD AUTO: 6.28 K/UL (ref 3.9–12.7)

## 2022-04-26 PROCEDURE — 99203 OFFICE O/P NEW LOW 30 MIN: CPT | Mod: S$GLB,,, | Performed by: INTERNAL MEDICINE

## 2022-04-26 PROCEDURE — 36415 COLL VENOUS BLD VENIPUNCTURE: CPT | Performed by: INTERNAL MEDICINE

## 2022-04-26 PROCEDURE — 99203 PR OFFICE/OUTPT VISIT, NEW, LEVL III, 30-44 MIN: ICD-10-PCS | Mod: S$GLB,,, | Performed by: INTERNAL MEDICINE

## 2022-04-26 PROCEDURE — 80053 COMPREHEN METABOLIC PANEL: CPT | Performed by: INTERNAL MEDICINE

## 2022-04-26 PROCEDURE — 85025 COMPLETE CBC W/AUTO DIFF WBC: CPT | Performed by: INTERNAL MEDICINE

## 2022-05-02 ENCOUNTER — TELEPHONE (OUTPATIENT)
Dept: HEMATOLOGY/ONCOLOGY | Facility: CLINIC | Age: 57
End: 2022-05-02

## 2022-05-02 DIAGNOSIS — C50.912 MALIGNANT NEOPLASM OF LEFT BREAST IN FEMALE, ESTROGEN RECEPTOR POSITIVE, UNSPECIFIED SITE OF BREAST: Primary | ICD-10-CM

## 2022-05-02 DIAGNOSIS — Z17.0 MALIGNANT NEOPLASM OF OVERLAPPING SITES OF LEFT BREAST IN FEMALE, ESTROGEN RECEPTOR POSITIVE: ICD-10-CM

## 2022-05-02 DIAGNOSIS — C50.812 MALIGNANT NEOPLASM OF OVERLAPPING SITES OF LEFT BREAST IN FEMALE, ESTROGEN RECEPTOR POSITIVE: ICD-10-CM

## 2022-05-02 DIAGNOSIS — Z85.3 HISTORY OF BREAST CANCER: ICD-10-CM

## 2022-05-02 DIAGNOSIS — Z17.0 MALIGNANT NEOPLASM OF LEFT BREAST IN FEMALE, ESTROGEN RECEPTOR POSITIVE, UNSPECIFIED SITE OF BREAST: Primary | ICD-10-CM

## 2022-05-02 DIAGNOSIS — M89.8X9 BONE PAIN: ICD-10-CM

## 2022-05-09 ENCOUNTER — TELEPHONE (OUTPATIENT)
Dept: HEMATOLOGY/ONCOLOGY | Facility: CLINIC | Age: 57
End: 2022-05-09

## 2022-05-09 DIAGNOSIS — M89.8X9 BONE PAIN: ICD-10-CM

## 2022-05-09 DIAGNOSIS — C50.912 MALIGNANT NEOPLASM OF LEFT BREAST IN FEMALE, ESTROGEN RECEPTOR POSITIVE, UNSPECIFIED SITE OF BREAST: Primary | ICD-10-CM

## 2022-05-09 DIAGNOSIS — Z17.0 MALIGNANT NEOPLASM OF LEFT BREAST IN FEMALE, ESTROGEN RECEPTOR POSITIVE, UNSPECIFIED SITE OF BREAST: Primary | ICD-10-CM

## 2022-05-09 NOTE — TELEPHONE ENCOUNTER
Spoke to patient and made aware that orders were changed from a whole body scan to routine scan due to whole body scan only being approved for patient with history of melanoma, which she does not have. Patient states that she had complaints of shin pain, which a routine scan will not check. Informed Hillary of this and she placed orders for bone scan. Informed patient of this and that scheduling will call to schedule but if she does not hear from them in the next couple of days to call the imaging center to schedule. Verbalized understanding.

## 2022-05-13 ENCOUNTER — HOSPITAL ENCOUNTER (OUTPATIENT)
Dept: RADIOLOGY | Facility: HOSPITAL | Age: 57
Discharge: HOME | End: 2022-05-13
Attending: NURSE PRACTITIONER
Payer: OTHER GOVERNMENT

## 2022-05-13 DIAGNOSIS — M89.8X9 BONE PAIN: ICD-10-CM

## 2022-05-13 DIAGNOSIS — Z17.0 MALIGNANT NEOPLASM OF LEFT BREAST IN FEMALE, ESTROGEN RECEPTOR POSITIVE, UNSPECIFIED SITE OF BREAST: ICD-10-CM

## 2022-05-13 DIAGNOSIS — C50.912 MALIGNANT NEOPLASM OF LEFT BREAST IN FEMALE, ESTROGEN RECEPTOR POSITIVE, UNSPECIFIED SITE OF BREAST: ICD-10-CM

## 2022-05-13 PROCEDURE — 78306 BONE IMAGING WHOLE BODY: CPT | Mod: TC

## 2022-05-13 PROCEDURE — A9503 TC99M MEDRONATE: HCPCS

## 2022-05-16 ENCOUNTER — HOSPITAL ENCOUNTER (OUTPATIENT)
Dept: RADIOLOGY | Facility: HOSPITAL | Age: 57
Discharge: HOME | End: 2022-05-16
Attending: NURSE PRACTITIONER
Payer: OTHER GOVERNMENT

## 2022-05-16 VITALS — WEIGHT: 168 LBS | BODY MASS INDEX: 25.46 KG/M2 | HEIGHT: 68 IN

## 2022-05-16 DIAGNOSIS — Z17.0 MALIGNANT NEOPLASM OF LEFT BREAST IN FEMALE, ESTROGEN RECEPTOR POSITIVE, UNSPECIFIED SITE OF BREAST: ICD-10-CM

## 2022-05-16 DIAGNOSIS — C50.812 MALIGNANT NEOPLASM OF OVERLAPPING SITES OF LEFT BREAST IN FEMALE, ESTROGEN RECEPTOR POSITIVE: ICD-10-CM

## 2022-05-16 DIAGNOSIS — C50.912 MALIGNANT NEOPLASM OF LEFT BREAST IN FEMALE, ESTROGEN RECEPTOR POSITIVE, UNSPECIFIED SITE OF BREAST: ICD-10-CM

## 2022-05-16 DIAGNOSIS — M89.8X9 BONE PAIN: ICD-10-CM

## 2022-05-16 DIAGNOSIS — Z17.0 MALIGNANT NEOPLASM OF OVERLAPPING SITES OF LEFT BREAST IN FEMALE, ESTROGEN RECEPTOR POSITIVE: ICD-10-CM

## 2022-05-16 LAB — GLUCOSE SERPL-MCNC: 84 MG/DL (ref 70–110)

## 2022-05-16 PROCEDURE — 78815 PET IMAGE W/CT SKULL-THIGH: CPT | Mod: TC,PO

## 2022-05-17 ENCOUNTER — TELEPHONE (OUTPATIENT)
Dept: HEMATOLOGY/ONCOLOGY | Facility: CLINIC | Age: 57
End: 2022-05-17

## 2022-06-01 PROBLEM — Z85.3 HISTORY OF BREAST CANCER: Status: RESOLVED | Noted: 2021-04-21 | Resolved: 2022-06-01

## 2022-06-28 ENCOUNTER — OFFICE VISIT (OUTPATIENT)
Dept: FAMILY MEDICINE | Facility: CLINIC | Age: 57
End: 2022-06-28
Payer: OTHER GOVERNMENT

## 2022-06-28 VITALS
TEMPERATURE: 99 F | SYSTOLIC BLOOD PRESSURE: 128 MMHG | HEIGHT: 68 IN | BODY MASS INDEX: 25.03 KG/M2 | DIASTOLIC BLOOD PRESSURE: 76 MMHG | WEIGHT: 165.13 LBS

## 2022-06-28 DIAGNOSIS — Z92.21 HISTORY OF CHEMOTHERAPY: ICD-10-CM

## 2022-06-28 DIAGNOSIS — K21.9 GASTROESOPHAGEAL REFLUX DISEASE, UNSPECIFIED WHETHER ESOPHAGITIS PRESENT: ICD-10-CM

## 2022-06-28 DIAGNOSIS — J06.9 UPPER RESPIRATORY INFECTION WITH COUGH AND CONGESTION: ICD-10-CM

## 2022-06-28 DIAGNOSIS — S92.355S CLOSED NONDISPLACED FRACTURE OF FIFTH METATARSAL BONE OF LEFT FOOT, SEQUELA: ICD-10-CM

## 2022-06-28 DIAGNOSIS — R50.9 FEVER, UNSPECIFIED FEVER CAUSE: Primary | ICD-10-CM

## 2022-06-28 DIAGNOSIS — Z78.0 MENOPAUSE: ICD-10-CM

## 2022-06-28 DIAGNOSIS — Z87.81 HISTORY OF FRACTURE OF FOOT: ICD-10-CM

## 2022-06-28 DIAGNOSIS — R52 BODY ACHES: ICD-10-CM

## 2022-06-28 LAB
CTP QC/QA: YES
SARS-COV-2 RDRP RESP QL NAA+PROBE: NEGATIVE

## 2022-06-28 PROCEDURE — 99214 PR OFFICE/OUTPT VISIT, EST, LEVL IV, 30-39 MIN: ICD-10-PCS | Mod: S$PBB,,, | Performed by: INTERNAL MEDICINE

## 2022-06-28 PROCEDURE — 99999 PR PBB SHADOW E&M-EST. PATIENT-LVL IV: ICD-10-PCS | Mod: PBBFAC,,, | Performed by: INTERNAL MEDICINE

## 2022-06-28 PROCEDURE — 99999 PR PBB SHADOW E&M-EST. PATIENT-LVL IV: CPT | Mod: PBBFAC,,, | Performed by: INTERNAL MEDICINE

## 2022-06-28 PROCEDURE — 99214 OFFICE O/P EST MOD 30 MIN: CPT | Mod: PBBFAC,PN | Performed by: INTERNAL MEDICINE

## 2022-06-28 PROCEDURE — 99214 OFFICE O/P EST MOD 30 MIN: CPT | Mod: S$PBB,,, | Performed by: INTERNAL MEDICINE

## 2022-06-28 PROCEDURE — U0002 COVID-19 LAB TEST NON-CDC: HCPCS | Mod: PBBFAC,PN | Performed by: INTERNAL MEDICINE

## 2022-06-28 RX ORDER — AMOXICILLIN 875 MG/1
875 TABLET, FILM COATED ORAL EVERY 12 HOURS
Qty: 14 TABLET | Refills: 0 | Status: SHIPPED | OUTPATIENT
Start: 2022-06-28 | End: 2023-08-29

## 2022-06-28 RX ORDER — PANTOPRAZOLE SODIUM 40 MG/1
40 TABLET, DELAYED RELEASE ORAL DAILY
Qty: 30 TABLET | Refills: 0 | Status: SHIPPED | OUTPATIENT
Start: 2022-06-28 | End: 2022-08-01

## 2022-06-28 RX ORDER — BENZONATATE 200 MG/1
200 CAPSULE ORAL 3 TIMES DAILY PRN
Qty: 30 CAPSULE | Refills: 0 | Status: SHIPPED | OUTPATIENT
Start: 2022-06-28 | End: 2023-08-29

## 2022-06-28 NOTE — TELEPHONE ENCOUNTER
No new care gaps identified.  [facility] Embedded Care Gaps. Reference number: [id number]. 6/28/2022   11:50:19 AM CDT

## 2022-06-28 NOTE — TELEPHONE ENCOUNTER
Refill Routing Note   Medication(s) are not appropriate for processing by [facility] for the following reason(s):      - Pt's chart preference w/ pharmacy=90 day supplies    [facility] action(s):  Defer          Medication reconciliation completed: No     Appointments  past 12m or future 3m with PCP    Date Provider   Last Visit   6/28/2022 Evans Baker MD   Next Visit   Visit date not found Evans Baker MD   ED visits in past 90 days: 0        Note composed:4:28 PM 06/28/2022

## 2022-06-28 NOTE — PROGRESS NOTES
Subjective:       Patient ID: Sonido Loera is a 56 y.o. female.    Chief Complaint: Nasal Congestion, Cough, Headache (Sx since thursday), and Fever    Thursday started feeling bad on Thursday.   102.3 / Monday 101.5 -sore throat, cough.  Headaches, mild nausea.  Not taking any thing   Daughter family sick 1 wk before.      Cough  This is a new problem. The current episode started in the past 7 days. Associated symptoms include a fever and headaches. Pertinent negatives include no chest pain.   Headache   Associated symptoms include coughing and a fever. Pertinent negatives include no eye pain.   Fever   Associated symptoms include coughing and headaches. Pertinent negatives include no chest pain.        Gyn: ANATOLIY Martin 2020   MM/201 - hx of invasive ductal breast cancer (tx Fl). SE: w tamoxifen took only a few months. Recently see Dr Toledo oncology.   Colonoscopy:   3/2015 r/c 5  Dr Davenport - reminded to make appt over due   Immunizations: Flu: Tdap: Pneumovax: recommend Shingles: recommend Covid:   Smoker: former     Reports had hand surgery last year during postop had a fall fractured her 5th proximal metatarsal.  Placed in walking boot management with ortho still having pain in that foot.  Has not had DEXA scan that she knows of.  Will get DEXA scan if osteopenia this fracture is 80 osteoporosis equivalent.  Talked about medications with patient.      Osteoarthritis hand/fibromyalgia pains:  Have baseline.  Trial naltrexone low-dose felt side effect headaches. / mgmt Dr. Kumar rheumatology, Ortho Dr Farr  Migraines:  Previously seen neurology headache specialist  Thyroid nodule:   stable US// hx benign FNA/ endo Dr Quezada  Review CT old:  Positive pulmonary nodule // recent PET negative   Upper throat irritation questionable reflux:  2019 EGD positive gastritis recommend Protonix.  Patient not taking any medication.     Review of Systems:  Review of Systems   Constitutional: Positive  "for fever. Negative for appetite change.   HENT: Negative for nosebleeds.    Eyes: Negative for pain.   Respiratory: Positive for cough. Negative for choking.    Cardiovascular: Negative for chest pain.   Gastrointestinal: Negative for blood in stool.   Genitourinary: Negative for hematuria.   Musculoskeletal: Positive for arthralgias. Negative for joint swelling.   Skin: Negative for pallor.   Neurological: Positive for headaches. Negative for facial asymmetry.   Hematological: Does not bruise/bleed easily.   Psychiatric/Behavioral: Negative for confusion.       Objective:     Vitals:    06/28/22 1052   BP: 128/76   Temp: 98.5 °F (36.9 °C)   Weight: 74.9 kg (165 lb 2 oz)   Height: 5' 8" (1.727 m)          Physical Exam  Vitals reviewed.   Constitutional:       Appearance: Normal appearance.      Comments: Voice hoarseness   HENT:      Head: Normocephalic and atraumatic.      Mouth/Throat:      Pharynx: Oropharynx is clear.   Eyes:      Extraocular Movements: Extraocular movements intact.      Conjunctiva/sclera: Conjunctivae normal.      Pupils: Pupils are equal, round, and reactive to light.   Cardiovascular:      Rate and Rhythm: Normal rate and regular rhythm.      Heart sounds: Normal heart sounds.   Pulmonary:      Effort: Pulmonary effort is normal.      Breath sounds: Normal breath sounds.   Abdominal:      General: Bowel sounds are normal.      Palpations: Abdomen is soft.   Musculoskeletal:         General: Normal range of motion.      Cervical back: Normal range of motion and neck supple.   Skin:     General: Skin is warm and dry.   Neurological:      General: No focal deficit present.      Mental Status: She is alert and oriented to person, place, and time.   Psychiatric:         Mood and Affect: Mood normal.         Medication List with Changes/Refills   New Medications    AMOXICILLIN (AMOXIL) 875 MG TABLET    Take 1 tablet (875 mg total) by mouth every 12 (twelve) hours.    BENZONATATE (TESSALON) 200 " MG CAPSULE    Take 1 capsule (200 mg total) by mouth 3 (three) times daily as needed for Cough.    PANTOPRAZOLE (PROTONIX) 40 MG TABLET    Take 1 tablet (40 mg total) by mouth once daily.   Current Medications    ASCORBIC ACID (VITAMIN C) 500 MG TABLET    Take 500 mg by mouth once daily.    B COMPLEX VITAMINS CAPSULE    Take 1 capsule by mouth once daily.    EPINEPHRINE (EPIPEN) 0.3 MG/0.3 ML ATIN    Inject 0.3 mLs (0.3 mg total) into the muscle once. for 1 dose    MULTIVITAMIN (THERAGRAN) PER TABLET    Take 1 tablet by mouth once daily.    NAPROXEN SODIUM (ANAPROX) 220 MG TABLET    Take 220 mg by mouth every 12 (twelve) hours.   Discontinued Medications    TRAMADOL (ULTRAM) 50 MG TABLET    1 tab PO as needed for severe migraine. No more than 10 days per month.       Assessment & Plan:  1. Fever, unspecified fever cause  - POCT COVID-19 Rapid Screening  - POCT Influenza A/B Molecular    2. Body aches  - POCT COVID-19 Rapid Screening  - POCT Influenza A/B Molecular    3. Upper respiratory infection with cough and congestion    4. Menopause  - DXA Bone Density Spine And Hip; Future    5. History of chemotherapy  - DXA Bone Density Spine And Hip; Future    6. History of fracture of foot  - DXA Bone Density Spine And Hip; Future    7. Closed nondisplaced fracture of fifth metatarsal bone of left foot, sequela  - Ambulatory referral/consult to Podiatry; Future     Fever, unspecified fever cause  -     POCT COVID-19 Rapid Screening  -     POCT Influenza A/B Molecular    Body aches  -     POCT COVID-19 Rapid Screening  -     POCT Influenza A/B Molecular    Upper respiratory infection with cough and congestion    Menopause  -     DXA Bone Density Spine And Hip; Future; Expected date: 06/28/2022    History of chemotherapy  -     DXA Bone Density Spine And Hip; Future; Expected date: 06/28/2022    History of fracture of foot  -     DXA Bone Density Spine And Hip; Future; Expected date: 06/28/2022    Closed nondisplaced  fracture of fifth metatarsal bone of left foot, sequela  Comments:  Refer to Podiatry  Orders:  -     Ambulatory referral/consult to Podiatry; Future; Expected date: 07/05/2022    Other orders  -     benzonatate (TESSALON) 200 MG capsule; Take 1 capsule (200 mg total) by mouth 3 (three) times daily as needed for Cough.  Dispense: 30 capsule; Refill: 0  -     amoxicillin (AMOXIL) 875 MG tablet; Take 1 tablet (875 mg total) by mouth every 12 (twelve) hours.  Dispense: 14 tablet; Refill: 0  -     pantoprazole (PROTONIX) 40 MG tablet; Take 1 tablet (40 mg total) by mouth once daily.  Dispense: 30 tablet; Refill: 0        Continue to work on regular exercise, maintain healthy weight, balanced diet. Avoid unhealthy habits: smoking, excessive alcohol intake.

## 2022-06-29 ENCOUNTER — HOSPITAL ENCOUNTER (OUTPATIENT)
Dept: RADIOLOGY | Facility: HOSPITAL | Age: 57
Discharge: HOME | End: 2022-06-29
Attending: INTERNAL MEDICINE
Payer: OTHER GOVERNMENT

## 2022-06-29 DIAGNOSIS — Z78.0 MENOPAUSE: ICD-10-CM

## 2022-06-29 DIAGNOSIS — Z87.81 HISTORY OF FRACTURE OF FOOT: ICD-10-CM

## 2022-06-29 DIAGNOSIS — Z92.21 HISTORY OF CHEMOTHERAPY: ICD-10-CM

## 2022-06-29 PROCEDURE — 77080 DXA BONE DENSITY AXIAL: CPT | Mod: 26,,, | Performed by: RADIOLOGY

## 2022-06-29 PROCEDURE — 77080 DEXA BONE DENSITY SPINE HIP: ICD-10-PCS | Mod: 26,,, | Performed by: RADIOLOGY

## 2022-06-29 PROCEDURE — 77080 DXA BONE DENSITY AXIAL: CPT | Mod: TC,PO

## 2022-06-29 RX ORDER — PANTOPRAZOLE SODIUM 40 MG/1
TABLET, DELAYED RELEASE ORAL
Qty: 90 TABLET | OUTPATIENT
Start: 2022-06-29

## 2022-07-14 ENCOUNTER — HOSPITAL ENCOUNTER (OUTPATIENT)
Dept: RADIOLOGY | Facility: HOSPITAL | Age: 57
Discharge: HOME | End: 2022-07-14
Attending: STUDENT IN AN ORGANIZED HEALTH CARE EDUCATION/TRAINING PROGRAM
Payer: OTHER GOVERNMENT

## 2022-07-14 ENCOUNTER — OFFICE VISIT (OUTPATIENT)
Dept: PODIATRY | Facility: CLINIC | Age: 57
End: 2022-07-14
Payer: OTHER GOVERNMENT

## 2022-07-14 DIAGNOSIS — S92.355S CLOSED NONDISPLACED FRACTURE OF FIFTH METATARSAL BONE OF LEFT FOOT, SEQUELA: ICD-10-CM

## 2022-07-14 PROCEDURE — 99214 PR OFFICE/OUTPT VISIT, EST, LEVL IV, 30-39 MIN: ICD-10-PCS | Mod: S$PBB,,, | Performed by: STUDENT IN AN ORGANIZED HEALTH CARE EDUCATION/TRAINING PROGRAM

## 2022-07-14 PROCEDURE — 99999 PR PBB SHADOW E&M-EST. PATIENT-LVL III: ICD-10-PCS | Mod: PBBFAC,,, | Performed by: STUDENT IN AN ORGANIZED HEALTH CARE EDUCATION/TRAINING PROGRAM

## 2022-07-14 PROCEDURE — 99999 PR PBB SHADOW E&M-EST. PATIENT-LVL III: CPT | Mod: PBBFAC,,, | Performed by: STUDENT IN AN ORGANIZED HEALTH CARE EDUCATION/TRAINING PROGRAM

## 2022-07-14 PROCEDURE — 73630 X-RAY EXAM OF FOOT: CPT | Mod: 26,LT,, | Performed by: RADIOLOGY

## 2022-07-14 PROCEDURE — 73630 X-RAY EXAM OF FOOT: CPT | Mod: TC,PO,LT

## 2022-07-14 PROCEDURE — 99214 OFFICE O/P EST MOD 30 MIN: CPT | Mod: S$PBB,,, | Performed by: STUDENT IN AN ORGANIZED HEALTH CARE EDUCATION/TRAINING PROGRAM

## 2022-07-14 PROCEDURE — 99213 OFFICE O/P EST LOW 20 MIN: CPT | Mod: PBBFAC,PN | Performed by: STUDENT IN AN ORGANIZED HEALTH CARE EDUCATION/TRAINING PROGRAM

## 2022-07-14 PROCEDURE — 73630 XR FOOT COMPLETE 3 VIEW LEFT: ICD-10-PCS | Mod: 26,LT,, | Performed by: RADIOLOGY

## 2022-07-14 NOTE — PROGRESS NOTES
Subjective:      Patient ID: Sonido Loera is a 56 y.o. female.    Chief Complaint: Ankle Injury    Patient presents with continued left foot pain. She fractured her 5th metatarsal in August of 2021. She was treated by 2-3 different providers and went through about 6 months of a combination of WB and NWB in the boot. She reports discomfort after she came out of the boot that never fully went away. It hurts with certain ranges of motion like rolling her foot out.     Review of Systems   Musculoskeletal:        Pain left foot   All other systems reviewed and are negative.          Objective:      Physical Exam  Musculoskeletal:      Comments: Left foot:  Minor tenderness to palpation along the 5th metatarsal base.  Some discomfort with provocation of the peroneal tendons.    She also has focal edema with some tenderness over the distal phalanx of the 3rd toe.               Assessment:       Encounter Diagnosis   Name Primary?    Closed nondisplaced fracture of fifth metatarsal bone of left foot, sequela          Plan:       Sonido was seen today for ankle injury.    Diagnoses and all orders for this visit:    Closed nondisplaced fracture of fifth metatarsal bone of left foot, sequela  Comments:  Refer to Podiatry  Orders:  -     Ambulatory referral/consult to Podiatry  -     X-Ray Foot Complete Left; Future      I counseled the patient on her conditions, their implications and medical management.    Home PT to strengthen the peroneal tendons. I suspect that she had some atrophy and adhesions from being immobilize and NWB from around 6 months. She refuses PT at this time so I showed her some instructions for PT to perform at home.    Unclear why her left 3rd toe is edematous and painful. Xrays show some possible arthritis there. I recommend compression of the toe for now and to monitor for any improvements or worsening.     Trey Morgan DPM

## 2022-08-01 RX ORDER — PANTOPRAZOLE SODIUM 40 MG/1
TABLET, DELAYED RELEASE ORAL
Qty: 90 TABLET | Refills: 1 | Status: SHIPPED | OUTPATIENT
Start: 2022-08-01 | End: 2023-08-29

## 2022-08-01 NOTE — TELEPHONE ENCOUNTER
Refill Routing Note   Medication(s) are not appropriate for processing by [facility] for the following reason(s):      - Medication is a new start (<3 months)    [facility] action(s):  Defer          Medication reconciliation completed: No     Appointments  past 12m or future 3m with PCP    Date Provider   Last Visit   6/28/2022 Evans Baker MD   Next Visit   Visit date not found Evans Baker MD   ED visits in past 90 days: 0        Note composed:6:45 PM 08/01/2022

## 2022-08-01 NOTE — TELEPHONE ENCOUNTER
No new care gaps identified.  [facility] Embedded Care Gaps. Reference number: [id number]. 8/01/2022   3:53:11 AM PERRYT

## 2022-10-14 ENCOUNTER — TELEPHONE (OUTPATIENT)
Dept: FAMILY MEDICINE | Facility: CLINIC | Age: 57
End: 2022-10-14
Payer: OTHER GOVERNMENT

## 2022-10-14 ENCOUNTER — PATIENT MESSAGE (OUTPATIENT)
Dept: FAMILY MEDICINE | Facility: CLINIC | Age: 57
End: 2022-10-14
Payer: OTHER GOVERNMENT

## 2022-10-14 NOTE — TELEPHONE ENCOUNTER
----- Message from Arlene Melendrez sent at 10/14/2022 12:03 PM CDT -----  Contact: patient  Type:  Needs Medical Advice    Who Called: patient     Symptoms (please be specific): wiped eye before washing hands, eye is now irritated and feels rough , more red than  normal    How long has patient had these symptoms:  since yesterday     Pharmacy name and phone #:      [id number] - [place] - [address] AT [facility] [address]  [address] [address]  Phone: [phone] Fax: [phone]      Would the patient rather a call back or a response via [facility]? Call    Best Call Back Number: [phone] (Riva)    Additional Information: Patient visited someone in the hospital that tested positive for MRSA when she wiped her eye. Please call patient to advise

## 2023-01-13 ENCOUNTER — TELEPHONE (OUTPATIENT)
Dept: HEMATOLOGY/ONCOLOGY | Facility: CLINIC | Age: 58
End: 2023-01-13

## 2023-01-13 DIAGNOSIS — C50.912 MALIGNANT NEOPLASM OF LEFT BREAST IN FEMALE, ESTROGEN RECEPTOR POSITIVE, UNSPECIFIED SITE OF BREAST: Primary | ICD-10-CM

## 2023-01-13 DIAGNOSIS — Z17.0 MALIGNANT NEOPLASM OF LEFT BREAST IN FEMALE, ESTROGEN RECEPTOR POSITIVE, UNSPECIFIED SITE OF BREAST: Primary | ICD-10-CM

## 2023-01-13 NOTE — TELEPHONE ENCOUNTER
Per recommendations on last mammo and per Hillary's verbal orders, I placed orders for screening mammo. Attempted to call patient to make her aware, no answer, LVM.

## 2023-01-23 NOTE — Clinical Note
Please inform the patient that I received and reviewed prior colonoscopy report from Dr. Davenport. Patient had a colon polyp removed and her next surveillance colonoscopy due 3/2020. The report also indicated tortuous colon which typically means longer colon and has more folds/turns.ThanksKTP History/Exam/Medical Decision Making

## 2023-04-17 ENCOUNTER — HOSPITAL ENCOUNTER (OUTPATIENT)
Dept: RADIOLOGY | Facility: HOSPITAL | Age: 58
Discharge: HOME | End: 2023-04-17
Attending: NURSE PRACTITIONER
Payer: OTHER GOVERNMENT

## 2023-04-17 DIAGNOSIS — C50.912 MALIGNANT NEOPLASM OF LEFT BREAST IN FEMALE, ESTROGEN RECEPTOR POSITIVE, UNSPECIFIED SITE OF BREAST: ICD-10-CM

## 2023-04-17 DIAGNOSIS — Z17.0 MALIGNANT NEOPLASM OF LEFT BREAST IN FEMALE, ESTROGEN RECEPTOR POSITIVE, UNSPECIFIED SITE OF BREAST: ICD-10-CM

## 2023-04-17 PROCEDURE — 77067 SCR MAMMO BI INCL CAD: CPT | Mod: TC,PO

## 2023-04-18 ENCOUNTER — TELEPHONE (OUTPATIENT)
Dept: HEMATOLOGY/ONCOLOGY | Facility: CLINIC | Age: 58
End: 2023-04-18

## 2023-04-20 ENCOUNTER — LAB VISIT (OUTPATIENT)
Dept: LAB | Facility: HOSPITAL | Age: 58
End: 2023-04-20
Attending: INTERNAL MEDICINE
Payer: OTHER GOVERNMENT

## 2023-04-20 DIAGNOSIS — Z17.0 MALIGNANT NEOPLASM OF LEFT BREAST IN FEMALE, ESTROGEN RECEPTOR POSITIVE, UNSPECIFIED SITE OF BREAST: ICD-10-CM

## 2023-04-20 DIAGNOSIS — C50.912 MALIGNANT NEOPLASM OF LEFT BREAST IN FEMALE, ESTROGEN RECEPTOR POSITIVE, UNSPECIFIED SITE OF BREAST: ICD-10-CM

## 2023-04-20 DIAGNOSIS — Z98.890 S/P LUMPECTOMY, LEFT BREAST: ICD-10-CM

## 2023-04-20 LAB
ALBUMIN SERPL BCP-MCNC: 4.5 G/DL (ref 3.5–5.2)
ALP SERPL-CCNC: 49 U/L (ref 55–135)
ALT SERPL W/O P-5'-P-CCNC: 19 U/L (ref 10–44)
ANION GAP SERPL CALC-SCNC: 9 MMOL/L (ref 8–16)
AST SERPL-CCNC: 19 U/L (ref 10–40)
BASOPHILS # BLD AUTO: 0.02 K/UL (ref 0–0.2)
BASOPHILS NFR BLD: 0.3 % (ref 0–1.9)
BILIRUB SERPL-MCNC: 0.3 MG/DL (ref 0.1–1)
BUN SERPL-MCNC: 16 MG/DL (ref 6–20)
CALCIUM SERPL-MCNC: 10.1 MG/DL (ref 8.7–10.5)
CHLORIDE SERPL-SCNC: 102 MMOL/L (ref 95–110)
CO2 SERPL-SCNC: 31 MMOL/L (ref 23–29)
CREAT SERPL-MCNC: 0.6 MG/DL (ref 0.5–1.4)
DIFFERENTIAL METHOD: NORMAL
EOSINOPHIL # BLD AUTO: 0.3 K/UL (ref 0–0.5)
EOSINOPHIL NFR BLD: 4.4 % (ref 0–8)
ERYTHROCYTE [DISTWIDTH] IN BLOOD BY AUTOMATED COUNT: 13.9 % (ref 11.5–14.5)
EST. GFR  (NO RACE VARIABLE): >60 ML/MIN/1.73 M^2
GLUCOSE SERPL-MCNC: 85 MG/DL (ref 70–110)
HCT VFR BLD AUTO: 39.1 % (ref 37–48.5)
HGB BLD-MCNC: 12.7 G/DL (ref 12–16)
IMM GRANULOCYTES # BLD AUTO: 0.02 K/UL (ref 0–0.04)
IMM GRANULOCYTES NFR BLD AUTO: 0.3 % (ref 0–0.5)
LYMPHOCYTES # BLD AUTO: 2.2 K/UL (ref 1–4.8)
LYMPHOCYTES NFR BLD: 34.7 % (ref 18–48)
MCH RBC QN AUTO: 28.9 PG (ref 27–31)
MCHC RBC AUTO-ENTMCNC: 32.5 G/DL (ref 32–36)
MCV RBC AUTO: 89 FL (ref 82–98)
MONOCYTES # BLD AUTO: 0.6 K/UL (ref 0.3–1)
MONOCYTES NFR BLD: 8.9 % (ref 4–15)
NEUTROPHILS # BLD AUTO: 3.3 K/UL (ref 1.8–7.7)
NEUTROPHILS NFR BLD: 51.4 % (ref 38–73)
NRBC BLD-RTO: 0 /100 WBC
PLATELET # BLD AUTO: 275 K/UL (ref 150–450)
PMV BLD AUTO: 9.4 FL (ref 9.2–12.9)
POTASSIUM SERPL-SCNC: 4.8 MMOL/L (ref 3.5–5.1)
PROT SERPL-MCNC: 7.5 G/DL (ref 6–8.4)
RBC # BLD AUTO: 4.4 M/UL (ref 4–5.4)
SODIUM SERPL-SCNC: 142 MMOL/L (ref 136–145)
WBC # BLD AUTO: 6.43 K/UL (ref 3.9–12.7)

## 2023-04-20 PROCEDURE — 36415 COLL VENOUS BLD VENIPUNCTURE: CPT | Performed by: INTERNAL MEDICINE

## 2023-04-20 PROCEDURE — 80053 COMPREHEN METABOLIC PANEL: CPT | Performed by: INTERNAL MEDICINE

## 2023-04-20 PROCEDURE — 85025 COMPLETE CBC W/AUTO DIFF WBC: CPT | Performed by: INTERNAL MEDICINE

## 2023-04-24 NOTE — PROGRESS NOTES
[facility] Hematology/Oncology  PROGRESS NOTE - 2nd Follow-up Visit      Subjective:       Patient ID:   NAME: Sonido Loera : 1965     57 y.o. female    Referring Doc: Evans Baker (PCp)  Other Physicians: Mary Martin (Gyn); ARIANNE Martinez; Sowmya Kumar (Rheum); Na Singh (neuro)           Chief Complaint: left breast cancer f/u       History of Present Illness:     Patient returns today for a 2nd regularly scheduled follow-up visit.  The patient is here today to go over the results of the recently ordered labs, tests and studies. She is here by herself.       No new issues. Breathing ok. No CP, SOB, HA's or N/V. No weight loss.     She had mammo on 2023 negative    She had injection in her right knee with Dr Farr; she sees them agaoin in 3-4 weeks      ROS:   GEN: normal without any fever, night sweats or weight loss; occ bone aches; bilateral knee issues  HEENT: normal with no HA's, sore throat, stiff neck, changes in vision  CV: normal with no CP, SOB, PND, HANEY or orthopnea  PULM: normal with no SOB, cough, hemoptysis, sputum or pleuritic pain  GI: normal with no abdominal pain, nausea, vomiting, constipation, diarrhea, melanotic stools, BRBPR, or hematemesis  : normal with no hematuria, dysuria  BREAST: normal with no mass, discharge, pain  SKIN: normal with no rash, erythema, bruising, or swelling    Pain Scale:  0    Allergies:  Review of patient's allergies indicates:   Allergen Reactions    Gabapentin Other (See Comments)     dizzy    Tamoxifen analogues Other (See Comments)     Unclear thinking, shoulder pain    Adhesive Rash     Rash on contact       Medications:    Current Outpatient Medications:     ascorbic acid (VITAMIN C) 500 MG tablet, Take 500 mg by mouth once daily., Disp: , Rfl:     b complex vitamins capsule, Take 1 capsule by mouth once daily., Disp: , Rfl:     multivitamin (THERAGRAN) per tablet, Take 1 tablet by mouth once daily., Disp: , Rfl:     naproxen sodium (ANAPROX) 220  "MG tablet, Take 220 mg by mouth every 12 (twelve) hours., Disp: , Rfl:     amoxicillin (AMOXIL) 875 MG tablet, Take 1 tablet (875 mg total) by mouth every 12 (twelve) hours., Disp: 14 tablet, Rfl: 0    benzonatate (TESSALON) 200 MG capsule, Take 1 capsule (200 mg total) by mouth 3 (three) times daily as needed for Cough., Disp: 30 capsule, Rfl: 0    EPINEPHrine (EPIPEN) 0.3 mg/0.3 mL AtIn, Inject 0.3 mLs (0.3 mg total) into the muscle once. for 1 dose, Disp: 2 each, Rfl: 0    pantoprazole (PROTONIX) 40 MG tablet, TAKE 1 TABLET(40 MG) BY MOUTH EVERY DAY, Disp: 90 tablet, Rfl: 1    PMHx/PSHx Updates:  See patient's last visit with me on 4/26/2022.  See H&P on 4/26/2022        Pathology:   Cancer Staging   No matching staging information was found for the patient.          Objective:     Vitals:  Blood pressure (!) 157/72, pulse 70, temperature 97.8 °F (36.6 °C), resp. rate 18, height 5' 8" (1.727 m), weight 77 kg (169 lb 12.8 oz), last menstrual period 07/16/2011.    Physical Examination:   GEN: no apparent distress, comfortable; AAOx3  HEAD: atraumatic and normocephalic  EYES: no pallor, no icterus, PERRLA  ENT: OMM, no pharyngeal erythema, external ears WNL; no nasal discharge; no thrush  NECK: no masses, thyroid normal, trachea midline, no LAD/LN's, supple  CV: RRR with no murmur; normal pulse; normal S1 and S2; no pedal edema  CHEST: Normal respiratory effort; CTAB; normal breath sounds; no wheeze or crackles  ABDOM: nontender and nondistended; soft; normal bowel sounds; no rebound/guarding  MUSC/Skeletal: ROM normal; no crepitus; joints normal; no deformities or arthropathy  EXTREM: no clubbing, cyanosis, inflammation or swelling  SKIN: no rashes, lesions, ulcers, petechiae or subcutaneous nodules  : no hay  NEURO: grossly intact; motor/sensory WNL; AAOx3; no tremors  PSYCH: normal mood, affect and behavior  LYMPH: normal cervical, supraclavicular, axillary and groin LN's  Breast: patient declined; no new " issues          Labs:     Lab Results   Component Value Date    WBC 6.43 04/20/2023    HGB 12.7 04/20/2023    HCT 39.1 04/20/2023    MCV 89 04/20/2023     04/20/2023       CMP  Sodium   Date Value Ref Range Status   04/20/2023 142 136 - 145 mmol/L Final     Potassium   Date Value Ref Range Status   04/20/2023 4.8 3.5 - 5.1 mmol/L Final     Chloride   Date Value Ref Range Status   04/20/2023 102 95 - 110 mmol/L Final     CO2   Date Value Ref Range Status   04/20/2023 31 (H) 23 - 29 mmol/L Final     Glucose   Date Value Ref Range Status   04/20/2023 85 70 - 110 mg/dL Final     BUN   Date Value Ref Range Status   04/20/2023 16 6 - 20 mg/dL Final     Creatinine   Date Value Ref Range Status   04/20/2023 0.6 0.5 - 1.4 mg/dL Final     Calcium   Date Value Ref Range Status   04/20/2023 10.1 8.7 - 10.5 mg/dL Final     Total Protein   Date Value Ref Range Status   04/20/2023 7.5 6.0 - 8.4 g/dL Final     Albumin   Date Value Ref Range Status   04/20/2023 4.5 3.5 - 5.2 g/dL Final     Total Bilirubin   Date Value Ref Range Status   04/20/2023 0.3 0.1 - 1.0 mg/dL Final     Comment:     For infants and newborns, interpretation of results should be based  on gestational age, weight and in agreement with clinical  observations.    Premature Infant recommended reference ranges:  Up to 24 hours.............<8.0 mg/dL  Up to 48 hours............<12.0 mg/dL  3-5 days..................<15.0 mg/dL  6-29 days.................<15.0 mg/dL       Alkaline Phosphatase   Date Value Ref Range Status   04/20/2023 49 (L) 55 - 135 U/L Final     AST   Date Value Ref Range Status   04/20/2023 19 10 - 40 U/L Final     ALT   Date Value Ref Range Status   04/20/2023 19 10 - 44 U/L Final     Anion Gap   Date Value Ref Range Status   04/20/2023 9 8 - 16 mmol/L Final     eGFR   Date Value Ref Range Status   04/20/2023 >60.0 >60 mL/min/1.73 m^2 Final           Radiology/Diagnostic Studies:    Mammo Digital Screening Bilat w/ Chris    Result Date:  4/17/2023  EXAMINATION: MAMMO DIGITAL SCREENING BILAT WITH DIOGENES CLINICAL HISTORY: Z12.31,  Malignant neoplasm of unspecified site of left female breast 2009, post left lumpectomy. TECHNIQUE: CMS MANDATED QUALITY DATA - MAMMOGRAPHY - [facility] utilizes a reminder system to ensure that patients receive reminder letters for appointments. This includes reminders for routine screening mammograms, diagnostic mammograms, or other breast imaging interventions as appropriate. This patient will be placed in the appropriate reminder system. FINDINGS: Compared with the prior mammograms of 12/20/2021, 11/20/2020 and 05/09/2019, the breasts are again composed of scattered fibroglandular densities. There are no suspicious calcifications or masses detected.  The interpretation was assisted by Computer Aided Detection with [employer] ImageChecker.     Negative mammogram. Annual screening mammography is recommended. BI-RADS CATEGORY 1: NEGATIVE. Technologist: LUIS ANTONIO Electronically signed by: Clifford Whatley MD Date:    04/17/2023 Time:    10:51     PET 5/16/2022:    IMPRESSION:     1. Negative for recurrent malignancy or metastatic disease.  2. Mild FDG uptake along the esophagus, suggesting inflammation and possible esophagitis.    Bone scan  5/13/2022:     IMPRESSION:  No evidence of osseous metastatic disease.  Assessment/Plan:   (1) 57 y.o. female with diagnosis of left breast cancer back in 2009 who has been referred by No ref. provider found for evaluation by medical hematology/oncology. She had lumpectomy in 2009 while living in [place] and was treated with radiation, chemotherapy and oral antihormone therapy with tamoxifen in the past     - previously seen by Dr Sanna Lerner  - s/p lumpectomy  - 2cm invasive ductal carcinoma with no LN involvement  - she has yearly mammogram and had one couple of months ago    4/25/2023:  - recent mammogram negative  - no new issues     (2) Hypercholesterolemia     (3) Cervical  myofascial pain syndrome, fibromyalgia, Bilateral occipital neuralgia; erosive osteoarthritis     (4) Abdominal wall hernia, liver cyst, umbilical herna    VISIT DIAGNOSES:      Malignant neoplasm of left breast in female, estrogen receptor positive, unspecified site of breast    ER+ (estrogen receptor positive status)    S/P chemotherapy, time since greater than 12 weeks    S/P lumpectomy, left breast (2009)    S/P radiotherapy          PLAN:  F/u with ortho for her knee issues  2. Continue mammogram yearly  3. F/u with PCP, GYN, etc     RTC in  12 months  Fax note Mary Baker Martin    Discussion:     COVID-19 Discussion:    I had long discussion with patient and any applicable family about the COVID-19 coronavirus epidemic and the recommended precautions with regard to cancer and/or hematology patients. I have re-iterated the CDC recommendations for adequate hand washing, use of hand -like products, and coughing into elbow, etc. In addition, especially for our patients who are on chemotherapy and/or our otherwise immunocompromised patients, I have recommended avoidance of crowds, including movie theaters, restaurants, churches, etc. I have recommended avoidance of any sick or symptomatic family members and/or friends. I have also recommended avoidance of any raw and unwashed food products, and general avoidance of food items that have not been prepared by themselves. The patient has been asked to call us immediately with any symptom developments, issues, questions or other general concerns.       I spent over 25 mins of time with the patient. Reviewed results of the recently ordered labs, tests and studies; made directives with regards to the results. Over half of this time was spent couseling and coordinating care.    I have explained all of the above in detail and the patient understands all of the current recommendation(s). I have answered all of their questions to the best of my  ability and to their complete satisfaction.   The patient is to continue with the current management plan.            Electronically signed by Juni Nam MD

## 2023-04-25 ENCOUNTER — OFFICE VISIT (OUTPATIENT)
Dept: HEMATOLOGY/ONCOLOGY | Facility: CLINIC | Age: 58
End: 2023-04-25
Payer: OTHER GOVERNMENT

## 2023-04-25 VITALS
HEART RATE: 70 BPM | RESPIRATION RATE: 18 BRPM | BODY MASS INDEX: 25.73 KG/M2 | TEMPERATURE: 98 F | SYSTOLIC BLOOD PRESSURE: 157 MMHG | HEIGHT: 68 IN | WEIGHT: 169.81 LBS | DIASTOLIC BLOOD PRESSURE: 72 MMHG

## 2023-04-25 DIAGNOSIS — Z92.3 S/P RADIOTHERAPY: ICD-10-CM

## 2023-04-25 DIAGNOSIS — C50.912 MALIGNANT NEOPLASM OF LEFT BREAST IN FEMALE, ESTROGEN RECEPTOR POSITIVE, UNSPECIFIED SITE OF BREAST: Primary | ICD-10-CM

## 2023-04-25 DIAGNOSIS — Z98.890 S/P LUMPECTOMY, LEFT BREAST: ICD-10-CM

## 2023-04-25 DIAGNOSIS — Z17.0 MALIGNANT NEOPLASM OF LEFT BREAST IN FEMALE, ESTROGEN RECEPTOR POSITIVE, UNSPECIFIED SITE OF BREAST: Primary | ICD-10-CM

## 2023-04-25 DIAGNOSIS — Z17.0 ER+ (ESTROGEN RECEPTOR POSITIVE STATUS): ICD-10-CM

## 2023-04-25 DIAGNOSIS — Z92.21 S/P CHEMOTHERAPY, TIME SINCE GREATER THAN 12 WEEKS: ICD-10-CM

## 2023-04-25 PROCEDURE — 99214 OFFICE O/P EST MOD 30 MIN: CPT | Mod: S$GLB,,, | Performed by: INTERNAL MEDICINE

## 2023-04-25 PROCEDURE — 99214 PR OFFICE/OUTPT VISIT, EST, LEVL IV, 30-39 MIN: ICD-10-PCS | Mod: S$GLB,,, | Performed by: INTERNAL MEDICINE

## 2023-08-10 ENCOUNTER — TELEPHONE (OUTPATIENT)
Dept: NEUROLOGY | Facility: CLINIC | Age: 58
End: 2023-08-10
Payer: OTHER GOVERNMENT

## 2023-08-10 NOTE — TELEPHONE ENCOUNTER
----- Message from Kajal Perez sent at 8/10/2023  2:42 PM CDT -----  Regarding: Appointment  Contact: [phone]  Pts calling to get an appt. Pt states symptoms are  Tingling in face and down to legs  Severe fatigue  Light headed  Slight vertigo   Headaches  Cramps in calves  Sudden cramps   And a few other things  Please call pt to discuss what provider would be a best fit. Pt states she wants to see Dr. Marcos

## 2023-08-15 NOTE — TELEPHONE ENCOUNTER
Spoke with the pt, appt scheduled 8/28/23 at 0800 with Liseth Wilder.  Pt has multiple complaints. The worst is dizziness and tingling to face and legs. The pt feels she may have MS.  Informed her that she needed a neurological assessment and that the provider would then order the appropriate testing. She v/u.

## 2023-08-28 ENCOUNTER — TELEPHONE (OUTPATIENT)
Dept: NEUROLOGY | Facility: CLINIC | Age: 58
End: 2023-08-28
Payer: OTHER GOVERNMENT

## 2023-08-28 ENCOUNTER — OFFICE VISIT (OUTPATIENT)
Dept: NEUROLOGY | Facility: CLINIC | Age: 58
End: 2023-08-28
Payer: OTHER GOVERNMENT

## 2023-08-28 DIAGNOSIS — F09 COGNITIVE DYSFUNCTION: ICD-10-CM

## 2023-08-28 DIAGNOSIS — R29.90 MULTIPLE NEUROLOGICAL SYMPTOMS: ICD-10-CM

## 2023-08-28 DIAGNOSIS — M79.7 FIBROMYALGIA: ICD-10-CM

## 2023-08-28 DIAGNOSIS — R20.2 PARESTHESIA: ICD-10-CM

## 2023-08-28 DIAGNOSIS — R41.3 OTHER AMNESIA: ICD-10-CM

## 2023-08-28 DIAGNOSIS — R42 DIZZINESS AND GIDDINESS: Primary | ICD-10-CM

## 2023-08-28 PROCEDURE — 99205 OFFICE O/P NEW HI 60 MIN: CPT | Mod: 95,,, | Performed by: NURSE PRACTITIONER

## 2023-08-28 PROCEDURE — 99205 PR OFFICE/OUTPT VISIT, NEW, LEVL V, 60-74 MIN: ICD-10-PCS | Mod: 95,,, | Performed by: NURSE PRACTITIONER

## 2023-08-28 NOTE — PROGRESS NOTES
"NEUROLOGY  Outpatient Consultation Visit     [facility]  [address] [facility], [place] [address]  [phone] (office) / [phone] (fax)    Patient Name:  Sonido Loera  :  1965  MR #:  [id number]  Acct #:  [id number]    Date of  Visit: 2023    Other Physicians:  Evans Baker MD (Primary Care Physician)      The patient location is: [place], [place]  The chief complaint leading to consultation is: multiple neurological symptoms     Visit type: audiovisual    Face to Face time with patient: 44 min    Each patient to whom he or she provides medical services by telemedicine is:  (1) informed of the relationship between the physician and patient and the respective role of any other health care provider with respect to management of the patient; and (2) notified that he or she may decline to receive medical services by telemedicine and may withdraw from such care at any time.    HISTORY OF PRESENT ILLNESS:  Sonido Loera is a 57 y.o. female seen in consultation for multiple neurological concerns per Self, Aaareferral    Medical history is significant for HLD, breast cancer (s/p XRT, chemo, lumpectomy), fibromyalgia, GERD, erosive osteoarthritis, occipital neuralgia.     Accompanied during VV by her , who assists with history.     Noted onset of vertigo in 2023. Describes a spinning sensation with gait instability and diplopia that lasted 7-10 days. This was similar to episodes of vertigo that she has had in the past. In fact, was previously diagnosed with vestibular migraines by [facility] neurology.     She has not been herself since the vertigo episode. She describes various symptoms as noted below:    Intense fatigue, to the point of exhaustion for a period of 3 months afterwards, which is unusual for her as she is typically always active, on the go. This has improved a bit, but she still notes a sense of mild fatigue (before felt "lifeless").     Had one " "episode where she woke from sleeping on her L side and went to reach to turn alarm off and felt like the RUE was dead, numb, limp. Went back to sleep and this resolved.     Has also had episodic intense cramps in the legs. Her calves always feel tight at night, but she describes an episode of her foot flying forward ("like foot drop") and being unable to control the leg. Unsure of which one. Lasted for seconds.     Also notes tingling sensations affecting her entire body at different times, no particular pattern (her genitalia, fingertips, mouth, tongue, face, toes).     Has different sensation of being pricked with needs or chopsticks, like electrical shock. This affects her eyeballs, fingers and toes without any clear pattern.     Also has sensation of "zapping" like she is getting stung with a taser in various parts of her body. This has been going on for years.     Her feet feels like they are in a bucket of water. Ongoing for 3-4 years.     Gait has been unsteady, like she is skimming the walls in her hallway and sometimes veering to the left. No falls.      has noted episodic slurred speech. He describes that she is making errors like saying "tropical" instead of "topical." She used to be able to do calculations in her head and is now having trouble with calculations even if she is using a calculator. She feels that she isn't able to think clearly.     Last week, had a severe episode of dyspnea. She felt like she was dying, like she couldn't breathe.  has been researching her symptoms and fears that this was the "MS aida." He states that she has had various vague symptoms ongoing for years that get better and then worse, like what he read about relapsing - remitting MS.     She has a history of breast cancer, diagnosed ~ 2009. Treated with XRT and "red devil" chemotherapy. Not sure if the sensory sensations described above were before or afterwards, but thinks afterwards.     Has seen rheumatology " in the past and diagnosed with fibromyalgia.    She denies any C spine issues.     Saw a holistic provider recently at [facility] and had 32 lab tests done. She was told that all her labs were normal.     She sleeps well, no history of sleep disorder.     Denies anxiety or depression.     Allergies:  Review of patient's allergies indicates:   Allergen Reactions    Gabapentin Other (See Comments)     dizzy    Tamoxifen analogues Other (See Comments)     Unclear thinking, shoulder pain    Adhesive Rash     Rash on contact       Current Medications:  Current Outpatient Medications   Medication Sig Dispense Refill    ascorbic acid (VITAMIN C) 500 MG tablet Take 500 mg by mouth once daily.      b complex vitamins capsule Take 1 capsule by mouth once daily.      multivitamin (THERAGRAN) per tablet Take 1 tablet by mouth once daily.      naproxen sodium (ANAPROX) 220 MG tablet Take 220 mg by mouth every 12 (twelve) hours.      amoxicillin (AMOXIL) 875 MG tablet Take 1 tablet (875 mg total) by mouth every 12 (twelve) hours. 14 tablet 0    benzonatate (TESSALON) 200 MG capsule Take 1 capsule (200 mg total) by mouth 3 (three) times daily as needed for Cough. 30 capsule 0    EPINEPHrine (EPIPEN) 0.3 mg/0.3 mL AtIn Inject 0.3 mLs (0.3 mg total) into the muscle once. for 1 dose 2 each 0    pantoprazole (PROTONIX) 40 MG tablet TAKE 1 TABLET(40 MG) BY MOUTH EVERY DAY 90 tablet 1     No current facility-administered medications for this visit.       Past Medical History:  Past Medical History:   Diagnosis Date    Allergic rhinitis due to pollen     Breast cancer 2009    left lumpectomy invasive ductal (dx [place]) tamo    Closed nondisplaced fracture of fifth metatarsal bone of left foot with routine healing     2021// hx of right same area fracture 1999 (boot for both)    Colon polyp     Encounter for blood transfusion     after miscarriage     ER+ (estrogen receptor positive status) 04/25/2022    Erosive osteoarthritis  2020    Fibromyalgia     Headache     Hernia of abdominal wall     Liver cyst     Malignant neoplasm of left breast in female, estrogen receptor positive 2022    S/P chemotherapy, time since greater than 12 weeks 2022    S/P lumpectomy, left breast () 2022    S/P radiotherapy 2022    Thyroid nodule     left nodule  stable    Umbilical hernia        Past Surgical History:  Past Surgical History:   Procedure Laterality Date    BREAST LUMPECTOMY Left     with radiation and chemotherapy     SECTION      1    CHOLECYSTECTOMY      COLONOSCOPY  2015    Dr. Davenport, sent for scanning: one colon polyps removed, tortuous colon, large internal hemorrhoids; biopsy: tubular adenoma, repeat in 5 years for surveillance    ESOPHAGOGASTRODUODENOSCOPY N/A 2019    Procedure: EGD (ESOPHAGOGASTRODUODENOSCOPY);  Surgeon: Michael Hebert Jr., MD;  Location: [facility] ENDO;  Service: Endoscopy;  Laterality: N/A;    HERNIA REPAIR      REPAIR OF COLLATERAL LIGAMENT OF THUMB Bilateral      Dr Farr    ROBOT-ASSISTED LAPAROSCOPIC REPAIR OF INCISIONAL HERNIA N/A 10/07/2019    Procedure: ROBOTIC REPAIR, HERNIA, INCISIONAL;  Surgeon: Alexis Gonzalez MD;  Location: [facility] OR;  Service: General;  Laterality: N/A;    VEIN SURGERY Right     Varicose vein sclerotherapy       Family History:  family history includes AVM in her sister; Arthritis in her mother; Breast cancer (age of onset: 78) in her maternal aunt; Breast cancer (age of onset: 82) in her mother; Cancer in her mother; Chronic infections in her sister; Cirrhosis in her maternal grandmother; Heart disease in her father; Liver cancer in her paternal aunt; No Known Problems in her brother; Rhinitis in her daughter.    Social History:   reports that she quit smoking about 40 years ago. Her smoking use included cigarettes. She started smoking about 43 years ago. She has a 3.0 pack-year smoking history. She has never used smokeless  "tobacco. She reports that she does not currently use alcohol after a past usage of about 1.0 standard drink of alcohol per week. She reports that she does not use drugs.      REVIEW OF SYSTEMS:  As per HPI    PHYSICAL EXAM:  LMP 07/16/2011     General: Well groomed. No acute distress.  Pulmonary: Normal effort and rate.     Neurological exam:  Mental status: Awake and alert.  Oriented to person, place, time and situation. Recent and remote memory appear to be intact. 3/3 on DR. [place] of knowledge normal. Normal attention and concentration.   Speech/Language: Fluent and appropriate. No dysarthria or aphasia on conversation.   Cranial nerves (II-XII): Extraocular movements intact, no overt ptosis or nystagmus. Face symmetric. Hearing grossly intact. Palate TOMMY. Shoulder shrug normal bilaterally. Normal tongue protrusion.   Motor: RIVERA equally. No UE drift.   Sensation: TOMMY  DTR: TOMMY  Coordination: HANK normal bilaterally. No tremor with intent or posture in BUE.   Gait: TOMMY      DIAGNOSTIC DATA:  I have personally reviewed provider notes, labs and imaging made available to me today.     Imaging:  N/a    Cardiac:  Holter 6/2022:  The underlying rhythm is sinus  Rare PAC's were seen but no sustained arrhythmias or pauses  Normal cardiac event monitor    Labs:  Lab Results   Component Value Date    WBC 6.43 04/20/2023    HGB 12.7 04/20/2023    HCT 39.1 04/20/2023     04/20/2023    MCV 89 04/20/2023    RDW 13.9 04/20/2023     Lab Results   Component Value Date     04/20/2023    K 4.8 04/20/2023     04/20/2023    CO2 31 (H) 04/20/2023    BUN 16 04/20/2023    CREATININE 0.6 04/20/2023    GLU 85 04/20/2023    CALCIUM 10.1 04/20/2023     Lab Results   Component Value Date    PROT 7.5 04/20/2023    ALBUMIN 4.5 04/20/2023    BILITOT 0.3 04/20/2023    AST 19 04/20/2023    ALKPHOS 49 (L) 04/20/2023    ALT 19 04/20/2023     No results found for: "INR", "PROTIME", "PTT"  Lab Results   Component Value Date    CHOL " "209 (H) 04/21/2021    HDL 77 (H) 04/21/2021    LDLCALC 123.8 04/21/2021    TRIG 41 04/21/2021    CHOLHDL 36.8 04/21/2021     No results found for: "LABA1C", "HGBA1C"   No results found for: "YTTTKJME28"  No results found for: "FOLATE"  Lab Results   Component Value Date    TSH 1.059 04/21/2021     Outside labs provided by patient:  TSH not done  SULEIMAN normal   Magnesium normal  B12 627  Hormones normal   Vit D low    ASSESSMENT & PLAN:  Sonido Loera is a 57 y.o.  female seen in consultation for multiple neurological concerns.     Problem List Items Addressed This Visit          Neuro    Multiple neurological symptoms    Current Assessment & Plan     58 y/o with multiple concerns today (some may be neurological in nature, while others are less likely to be neurological)  Several symptoms sound to be r/t prior diagnosis of fibromyalgia  She and her  are concerned that she has MS. Discussed that physical exam is important in MS evaluation and unable to be completed via VV today. Also would be unlikely diagnosis given her age. Will start with MRI brain w wo given cognitive/speech symptoms and reported facial tingling.   Check serologies for etiologies of paresthesias reported, as well as cognitive changes.   Follow up after testing for in person visit / complete neuro exam             Orthopedic    Fibromyalgia     Other Visit Diagnoses       Dizziness and giddiness    -  Primary    Other amnesia        Paresthesia        Cognitive dysfunction                Follow up: 4 weeks in person after testing    I spent a total of 65 minutes on the day of the visit.    This includes face to face time with the patient, as well as non-face to face time preparing for and completing the visit (review of prior diagnostic testing and clinical notes, obtaining or reviewing history, documenting clinical information in the EMR, independently interpreting and communicating results to the patient/family and coordinating ongoing " care).       I appreciate the opportunity to participate in the care of this patient. Please feel free to contact me with any concerns or questions.       Liseth Wilder, ACN-AG  [facility] [address] [place] [address]

## 2023-08-28 NOTE — TELEPHONE ENCOUNTER
----- Message from Kris Schilling sent at 8/28/2023  8:40 AM CDT -----  Regarding: appt today  Type:  Patient Returning Call    Who Called:Pt    Who Left Message for Patient:Seema    Does the patient know what this is regarding?:Appt today    Would the patient rather a call back or a response via [facility]? Call back    Best Call Back Number:[phone]      Additional Information: sts she can do the vv.    Please advise -- Thank you

## 2023-08-28 NOTE — TELEPHONE ENCOUNTER
----- Message from Liseth Wilder NP sent at 8/28/2023  3:28 PM CDT -----  Non fasting labs  MRI brain  Follow up in person for exam after MRI And labs done

## 2023-08-29 PROBLEM — R29.90 MULTIPLE NEUROLOGICAL SYMPTOMS: Status: ACTIVE | Noted: 2023-08-29

## 2023-08-29 NOTE — ASSESSMENT & PLAN NOTE
56 y/o with multiple concerns today (some may be neurological in nature, while others are less likely to be neurological)  Several symptoms sound to be r/t prior diagnosis of fibromyalgia  She and her  are concerned that she has MS. Discussed that physical exam is important in MS evaluation and unable to be completed via VV today. Also would be unlikely diagnosis given her age. Will start with MRI brain w wo given cognitive/speech symptoms and reported facial tingling.   Check serologies for etiologies of paresthesias reported, as well as cognitive changes.   Follow up after testing for in person visit / complete neuro exam

## 2023-08-30 ENCOUNTER — TELEPHONE (OUTPATIENT)
Dept: NEUROLOGY | Facility: CLINIC | Age: 58
End: 2023-08-30
Payer: OTHER GOVERNMENT

## 2023-08-30 NOTE — TELEPHONE ENCOUNTER
Spoke with the pt, appt held Sept 21st at 080, to be book 7 days prior to appt, pt v/u. Appt will need to be 1 hour due to fist visit being a virtual  per Liseth.

## 2023-08-30 NOTE — TELEPHONE ENCOUNTER
----- Message from Michele Juarez sent at 8/29/2023  2:51 PM CDT -----  Regarding: Results Appt Sooner  Contact: carlos at [phone]  Type:  Sooner Appointment Request    Caller is requesting a sooner appointment.     Name of Caller:   // carlos    When is the first available appointment?  Pt's appt 10/18/23    Symptoms:  review MRI Results from MRI Scheduled 9/12/23     Best Call Back Number:  [phone]    Additional Information:  PT states at last appt LOW [place] wanted to see as quickly as possible after mri to discuss results. Possible MS or Tumor.

## 2023-09-05 ENCOUNTER — TELEPHONE (OUTPATIENT)
Dept: NEUROLOGY | Facility: CLINIC | Age: 58
End: 2023-09-05
Payer: OTHER GOVERNMENT

## 2023-09-05 NOTE — TELEPHONE ENCOUNTER
----- Message from Michele Juarez sent at 9/5/2023  9:37 AM CDT -----  Regarding: MRI question  Contact:  at [phone]  Type: Needs Medical Advice    Who Called:   // Caio    Symptoms (please be specific):  pt took tepid bath and is having worsening symptoms    How long has patient had these symptoms:  9/4    Best Call Back Number: [phone]    Additional Information:  wants to know if can add back/spine MRI order to MRI of brain on 9/12. Please call to discuss

## 2023-09-07 ENCOUNTER — LAB VISIT (OUTPATIENT)
Dept: LAB | Facility: HOSPITAL | Age: 58
End: 2023-09-07
Payer: OTHER GOVERNMENT

## 2023-09-07 DIAGNOSIS — F09 COGNITIVE DYSFUNCTION: ICD-10-CM

## 2023-09-07 DIAGNOSIS — R20.2 PARESTHESIA: ICD-10-CM

## 2023-09-07 LAB
CREAT SERPL-MCNC: 0.6 MG/DL (ref 0.5–1.4)
CRP SERPL-MCNC: 1.5 MG/L (ref 0–8.2)
ERYTHROCYTE [SEDIMENTATION RATE] IN BLOOD BY PHOTOMETRIC METHOD: 4 MM/HR (ref 0–36)
EST. GFR  (NO RACE VARIABLE): >60 ML/MIN/1.73 M^2
FOLATE SERPL-MCNC: 15.9 NG/ML (ref 4–24)
TSH SERPL DL<=0.005 MIU/L-ACNC: 1.48 UIU/ML (ref 0.4–4)

## 2023-09-07 PROCEDURE — 84425 ASSAY OF VITAMIN B-1: CPT | Performed by: NURSE PRACTITIONER

## 2023-09-07 PROCEDURE — 36415 COLL VENOUS BLD VENIPUNCTURE: CPT | Mod: PO | Performed by: NURSE PRACTITIONER

## 2023-09-07 PROCEDURE — 83921 ORGANIC ACID SINGLE QUANT: CPT | Performed by: NURSE PRACTITIONER

## 2023-09-07 PROCEDURE — 84165 PATHOLOGIST INTERPRETATION SPE: ICD-10-PCS | Mod: 26,,, | Performed by: PATHOLOGY

## 2023-09-07 PROCEDURE — 86235 NUCLEAR ANTIGEN ANTIBODY: CPT | Mod: 59 | Performed by: NURSE PRACTITIONER

## 2023-09-07 PROCEDURE — 82746 ASSAY OF FOLIC ACID SERUM: CPT | Performed by: NURSE PRACTITIONER

## 2023-09-07 PROCEDURE — 84207 ASSAY OF VITAMIN B-6: CPT | Performed by: NURSE PRACTITIONER

## 2023-09-07 PROCEDURE — 86334 IMMUNOFIX E-PHORESIS SERUM: CPT | Performed by: NURSE PRACTITIONER

## 2023-09-07 PROCEDURE — 86592 SYPHILIS TEST NON-TREP QUAL: CPT | Performed by: NURSE PRACTITIONER

## 2023-09-07 PROCEDURE — 86334 PATHOLOGIST INTERPRETATION IFE: ICD-10-PCS | Mod: 26,,, | Performed by: PATHOLOGY

## 2023-09-07 PROCEDURE — 86140 C-REACTIVE PROTEIN: CPT | Performed by: NURSE PRACTITIONER

## 2023-09-07 PROCEDURE — 84165 PROTEIN E-PHORESIS SERUM: CPT | Performed by: NURSE PRACTITIONER

## 2023-09-07 PROCEDURE — 84630 ASSAY OF ZINC: CPT | Performed by: NURSE PRACTITIONER

## 2023-09-07 PROCEDURE — 84165 PROTEIN E-PHORESIS SERUM: CPT | Mod: 26,,, | Performed by: PATHOLOGY

## 2023-09-07 PROCEDURE — 84443 ASSAY THYROID STIM HORMONE: CPT | Performed by: NURSE PRACTITIONER

## 2023-09-07 PROCEDURE — 85652 RBC SED RATE AUTOMATED: CPT | Performed by: NURSE PRACTITIONER

## 2023-09-07 PROCEDURE — 82565 ASSAY OF CREATININE: CPT | Performed by: NURSE PRACTITIONER

## 2023-09-07 PROCEDURE — 86334 IMMUNOFIX E-PHORESIS SERUM: CPT | Mod: 26,,, | Performed by: PATHOLOGY

## 2023-09-07 PROCEDURE — 86036 ANCA SCREEN EACH ANTIBODY: CPT | Performed by: NURSE PRACTITIONER

## 2023-09-07 PROCEDURE — 86235 NUCLEAR ANTIGEN ANTIBODY: CPT | Performed by: NURSE PRACTITIONER

## 2023-09-08 LAB
ALBUMIN SERPL ELPH-MCNC: 4.37 G/DL (ref 3.35–5.55)
ALPHA1 GLOB SERPL ELPH-MCNC: 0.25 G/DL (ref 0.17–0.41)
ALPHA2 GLOB SERPL ELPH-MCNC: 0.67 G/DL (ref 0.43–0.99)
B-GLOBULIN SERPL ELPH-MCNC: 0.74 G/DL (ref 0.5–1.1)
GAMMA GLOB SERPL ELPH-MCNC: 0.97 G/DL (ref 0.67–1.58)
INTERPRETATION SERPL IFE-IMP: NORMAL
PROT SERPL-MCNC: 7 G/DL (ref 6–8.4)
RPR SER QL: NORMAL

## 2023-09-11 LAB
ANCA AB TITR SER IF: NORMAL TITER
ANTI-SSA ANTIBODY: 0.07 RATIO (ref 0–0.99)
ANTI-SSA INTERPRETATION: NEGATIVE
ANTI-SSB ANTIBODY: 0.13 RATIO (ref 0–0.99)
ANTI-SSB INTERPRETATION: NEGATIVE
P-ANCA TITR SER IF: NORMAL TITER
ZINC SERPL-MCNC: 60 UG/DL (ref 60–130)

## 2023-09-12 ENCOUNTER — HOSPITAL ENCOUNTER (OUTPATIENT)
Dept: RADIOLOGY | Facility: HOSPITAL | Age: 58
Discharge: HOME | End: 2023-09-12
Attending: NURSE PRACTITIONER
Payer: OTHER GOVERNMENT

## 2023-09-12 DIAGNOSIS — R41.3 OTHER AMNESIA: ICD-10-CM

## 2023-09-12 DIAGNOSIS — R42 DIZZINESS AND GIDDINESS: ICD-10-CM

## 2023-09-12 LAB
METHYLMALONATE SERPL-SCNC: 0.18 UMOL/L
PATHOLOGIST INTERPRETATION IFE: NORMAL
PATHOLOGIST INTERPRETATION SPE: NORMAL
VIT B1 BLD-MCNC: 72 UG/L (ref 38–122)

## 2023-09-12 PROCEDURE — 70553 MRI BRAIN STEM W/O & W/DYE: CPT | Mod: 26,,, | Performed by: RADIOLOGY

## 2023-09-12 PROCEDURE — 70553 MRI BRAIN W WO CONTRAST: ICD-10-PCS | Mod: 26,,, | Performed by: RADIOLOGY

## 2023-09-12 PROCEDURE — 70553 MRI BRAIN STEM W/O & W/DYE: CPT | Mod: TC,PO

## 2023-09-12 PROCEDURE — 25500020 PHARM REV CODE 255: Mod: PO | Performed by: NURSE PRACTITIONER

## 2023-09-12 PROCEDURE — A9585 GADOBUTROL INJECTION: HCPCS | Mod: PO | Performed by: NURSE PRACTITIONER

## 2023-09-12 RX ORDER — GADOBUTROL 604.72 MG/ML
7 INJECTION INTRAVENOUS
Status: COMPLETED | OUTPATIENT
Start: 2023-09-12 | End: 2023-09-12

## 2023-09-12 RX ADMIN — GADOBUTROL 7 ML: 604.72 INJECTION INTRAVENOUS at 11:09

## 2023-09-13 LAB — PYRIDOXAL SERPL-MCNC: 26 UG/L (ref 5–50)

## 2023-09-14 ENCOUNTER — PATIENT MESSAGE (OUTPATIENT)
Dept: NEUROLOGY | Facility: CLINIC | Age: 58
End: 2023-09-14
Payer: OTHER GOVERNMENT

## 2023-09-21 ENCOUNTER — LAB VISIT (OUTPATIENT)
Dept: LAB | Facility: HOSPITAL | Age: 58
End: 2023-09-21
Attending: NURSE PRACTITIONER
Payer: OTHER GOVERNMENT

## 2023-09-21 ENCOUNTER — OFFICE VISIT (OUTPATIENT)
Dept: NEUROLOGY | Facility: CLINIC | Age: 58
End: 2023-09-21
Payer: OTHER GOVERNMENT

## 2023-09-21 ENCOUNTER — TELEPHONE (OUTPATIENT)
Dept: NEUROLOGY | Facility: CLINIC | Age: 58
End: 2023-09-21

## 2023-09-21 VITALS
DIASTOLIC BLOOD PRESSURE: 86 MMHG | BODY MASS INDEX: 26.01 KG/M2 | HEART RATE: 75 BPM | WEIGHT: 171.63 LBS | RESPIRATION RATE: 16 BRPM | HEIGHT: 68 IN | SYSTOLIC BLOOD PRESSURE: 132 MMHG

## 2023-09-21 DIAGNOSIS — R29.2 HYPERREFLEXIA: ICD-10-CM

## 2023-09-21 DIAGNOSIS — R33.9 URINARY RETENTION: ICD-10-CM

## 2023-09-21 DIAGNOSIS — R20.2 PARESTHESIAS: ICD-10-CM

## 2023-09-21 DIAGNOSIS — R29.90 MULTIPLE NEUROLOGICAL SYMPTOMS: Primary | ICD-10-CM

## 2023-09-21 DIAGNOSIS — M54.2 CERVICALGIA: ICD-10-CM

## 2023-09-21 DIAGNOSIS — G37.9 DEMYELINATING DISEASE OF CENTRAL NERVOUS SYSTEM, UNSPECIFIED: ICD-10-CM

## 2023-09-21 LAB
CREAT SERPL-MCNC: 0.6 MG/DL (ref 0.5–1.4)
EST. GFR  (NO RACE VARIABLE): >60 ML/MIN/1.73 M^2

## 2023-09-21 PROCEDURE — 99999 PR PBB SHADOW E&M-EST. PATIENT-LVL V: CPT | Mod: PBBFAC,,, | Performed by: NURSE PRACTITIONER

## 2023-09-21 PROCEDURE — 36415 COLL VENOUS BLD VENIPUNCTURE: CPT | Mod: PO | Performed by: NURSE PRACTITIONER

## 2023-09-21 PROCEDURE — 99215 OFFICE O/P EST HI 40 MIN: CPT | Mod: PBBFAC,PO | Performed by: NURSE PRACTITIONER

## 2023-09-21 PROCEDURE — 99215 PR OFFICE/OUTPT VISIT, EST, LEVL V, 40-54 MIN: ICD-10-PCS | Mod: S$PBB,,, | Performed by: NURSE PRACTITIONER

## 2023-09-21 PROCEDURE — 99999 PR PBB SHADOW E&M-EST. PATIENT-LVL V: ICD-10-PCS | Mod: PBBFAC,,, | Performed by: NURSE PRACTITIONER

## 2023-09-21 PROCEDURE — 82565 ASSAY OF CREATININE: CPT | Performed by: NURSE PRACTITIONER

## 2023-09-21 PROCEDURE — 99215 OFFICE O/P EST HI 40 MIN: CPT | Mod: S$PBB,,, | Performed by: NURSE PRACTITIONER

## 2023-09-21 NOTE — PATIENT INSTRUCTIONS
MRI of the cervical and thoracic spine  If these are unrevealing, will then move forward with nerve conduction study of the L arm and leg

## 2023-09-21 NOTE — ASSESSMENT & PLAN NOTE
MRI brain w/wo personally reviewed with pt. Shows incidental pineal cyst, but otherwise is grossly normal.   Serologies also unremarkable   On exam, she has brisk reflexes at the knees, but no pathological reflexes. Aside from subtle IO weakness and small patch reported sensory change in L FA, exam non focal. With the urinary symptoms and pain in the mid back, must consider possible T spine lesion. Less likely, distal C spine. Will proceed with MRIs of the C and T spine w wo. If negative, will obtain EMG NCS of the L extremities.    questions if her symptoms could be long COVID syndrome. This would be a diagnosis of exclusion.

## 2023-09-21 NOTE — PROGRESS NOTES
"NEUROLOGY  Outpatient Visit     [facility]  [address] [facility], [place] [address]  [phone] (office) / [phone] (fax)    Patient Name:  Sonido Loera  :  1965  MR #:  [id number]  Acct #:  [id number]    Date of  Visit: 2023    Other Physicians:  Evans Baker MD (Primary Care Physician)      Interval history:  23:  Sonido Loera is a 58 y.o. female seen in f/u for multiple neurological concerns     Medical history is significant for HLD, breast cancer (s/p XRT, chemo, lumpectomy), fibromyalgia, GERD, erosive osteoarthritis, occipital neuralgia.     MRI brain was normal. Serologies were also normal.     No further dizziness. Still feels "lightheaded, disconnected" but less fatigued overall. The last week has been the best that she has had in a long while. She started driving again and has been able to tolerate walking outside to get some exercise.     Same tingling sensations as previously described. Most prevalent in the LUE from elbow to hand, this is constant. Sometimes present in the L toes and lateral foot intermittently.     Recently had another episode of intense pain, pressure and tightness around her T spine. Feels like she is being squeezed. Can't hardly talk or move when it happens. Constant sense of tingling and pressure in her mid back.     The other day, she had different episode of feeling like she was going to combust. Everything felt like it was on fire. Started in the nostrils, then her eyes, the tip of her nose, the mouth, tongue. Then, sensation moved to her lungs, her rectum, her feet. Her feet were red at the time.     Over the last year, reports sensing need to urinate, but then can't. More recently, notes sense of incomplete emptying and urgency. No incontinence. No change in bowel pattern.     HISTORY OF PRESENT ILLNESS 23:  Sonido Loera is a 58 y.o. female seen in consultation for multiple neurological concerns per No ref. " "provider found    Medical history is significant for HLD, breast cancer (s/p XRT, chemo, lumpectomy), fibromyalgia, GERD, erosive osteoarthritis, occipital neuralgia.     Accompanied during VV by her , who assists with history.     Noted onset of vertigo in April 2023. Describes a spinning sensation with gait instability and diplopia that lasted 7-10 days. This was similar to episodes of vertigo that she has had in the past. In fact, was previously diagnosed with vestibular migraines by [facility] neurology.     She has not been herself since the vertigo episode. She describes various symptoms as noted below:    Intense fatigue, to the point of exhaustion for a period of 3 months afterwards, which is unusual for her as she is typically always active, on the go. This has improved a bit, but she still notes a sense of mild fatigue (before felt "lifeless").     Had one episode where she woke from sleeping on her L side and went to reach to turn alarm off and felt like the RUE was dead, numb, limp. Went back to sleep and this resolved.     Has also had episodic intense cramps in the legs. Her calves always feel tight at night, but she describes an episode of her foot flying forward ("like foot drop") and being unable to control the leg. Unsure of which one. Lasted for seconds.     Also notes tingling sensations affecting her entire body at different times, no particular pattern (her genitalia, fingertips, mouth, tongue, face, toes).     Has different sensation of being pricked with needs or chopsticks, like electrical shock. This affects her eyeballs, fingers and toes without any clear pattern.     Also has sensation of "zapping" like she is getting stung with a taser in various parts of her body. This has been going on for years.     Her feet feels like they are in a bucket of water. Ongoing for 3-4 years.     Gait has been unsteady, like she is skimming the walls in her hallway and sometimes veering to the left. " "No falls.      has noted episodic slurred speech. He describes that she is making errors like saying "tropical" instead of "topical." She used to be able to do calculations in her head and is now having trouble with calculations even if she is using a calculator. She feels that she isn't able to think clearly.     Last week, had a severe episode of dyspnea. She felt like she was dying, like she couldn't breathe.  has been researching her symptoms and fears that this was the "MS aida." He states that she has had various vague symptoms ongoing for years that get better and then worse, like what he read about relapsing - remitting MS.     She has a history of breast cancer, diagnosed ~ 2009. Treated with XRT and "red devil" chemotherapy. Not sure if the sensory sensations described above were before or afterwards, but thinks afterwards.     Has seen rheumatology in the past and diagnosed with fibromyalgia.    She denies any C spine issues.     Saw a holistic provider recently at [facility] medicine and had 32 lab tests done. She was told that all her labs were normal.     She sleeps well, no history of sleep disorder.     Denies anxiety or depression.     Allergies:  Review of patient's allergies indicates:   Allergen Reactions    Gabapentin Other (See Comments)     dizzy    Tamoxifen analogues Other (See Comments)     Unclear thinking, shoulder pain    Adhesive Rash     Rash on contact       Current Medications:  Current Outpatient Medications   Medication Sig Dispense Refill    ascorbic acid (VITAMIN C) 500 MG tablet Take 500 mg by mouth once daily.      b complex vitamins capsule Take 1 capsule by mouth once daily.      multivitamin (THERAGRAN) per tablet Take 1 tablet by mouth once daily.      naproxen sodium (ANAPROX) 220 MG tablet Take 220 mg by mouth every 12 (twelve) hours.      EPINEPHrine (EPIPEN) 0.3 mg/0.3 mL AtIn Inject 0.3 mLs (0.3 mg total) into the muscle once. for 1 dose 2 each 0     No " current facility-administered medications for this visit.       Past Medical History:  Past Medical History:   Diagnosis Date    Allergic rhinitis due to pollen     Breast cancer     left lumpectomy invasive ductal (dx [place]) tamo    Closed nondisplaced fracture of fifth metatarsal bone of left foot with routine healing     / hx of right same area fracture  (boot for both)    Colon polyp     Encounter for blood transfusion     after miscarriage     ER+ (estrogen receptor positive status) 2022    Erosive osteoarthritis 2020    Fibromyalgia     Headache     Hernia of abdominal wall     Liver cyst     Malignant neoplasm of left breast in female, estrogen receptor positive 2022    S/P chemotherapy, time since greater than 12 weeks 2022    S/P lumpectomy, left breast () 2022    S/P radiotherapy 2022    Thyroid nodule     left nodule  stable    Umbilical hernia        Past Surgical History:  Past Surgical History:   Procedure Laterality Date    BREAST LUMPECTOMY Left     with radiation and chemotherapy     SECTION      1    CHOLECYSTECTOMY      COLONOSCOPY  2015    Dr. Davenport, sent for scanning: one colon polyps removed, tortuous colon, large internal hemorrhoids; biopsy: tubular adenoma, repeat in 5 years for surveillance    ESOPHAGOGASTRODUODENOSCOPY N/A 2019    Procedure: EGD (ESOPHAGOGASTRODUODENOSCOPY);  Surgeon: Michael Hebert Jr., MD;  Location: [facility];  Service: Endoscopy;  Laterality: N/A;    HERNIA REPAIR      REPAIR OF COLLATERAL LIGAMENT OF THUMB Bilateral      Dr Farr    ROBOT-ASSISTED LAPAROSCOPIC REPAIR OF INCISIONAL HERNIA N/A 10/07/2019    Procedure: ROBOTIC REPAIR, HERNIA, INCISIONAL;  Surgeon: Alexis Gonzalez MD;  Location: [facility];  Service: General;  Laterality: N/A;    VEIN SURGERY Right     Varicose vein sclerotherapy       Family History:  family history includes AVM in her sister; Arthritis in her mother;  "Breast cancer (age of onset: 78) in her maternal aunt; Breast cancer (age of onset: 82) in her mother; Cancer in her mother; Chronic infections in her sister; Cirrhosis in her maternal grandmother; Heart disease in her father; Liver cancer in her paternal aunt; No Known Problems in her brother; Rhinitis in her daughter.    Social History:   reports that she quit smoking about 40 years ago. Her smoking use included cigarettes. She started smoking about 43 years ago. She has a 3.0 pack-year smoking history. She has never used smokeless tobacco. She reports current alcohol use of about 1.0 standard drink of alcohol per week. She reports that she does not use drugs.      REVIEW OF SYSTEMS:  As per HPI    PHYSICAL EXAM:  /86 (BP Location: Right arm, Patient Position: Standing, BP Method: Medium (Automatic))   Pulse 75   Resp 16   Ht 5' 8" (1.727 m)   Wt 77.9 kg (171 lb 10.1 oz)   LMP 07/16/2011   BMI 26.10 kg/m²     General: Well groomed. No acute distress.  Pulmonary: Normal effort and rate.   Musculoskeletal: No obvious joint deformities, moves all extremities well.  Extremities: No clubbing, cyanosis or edema.     Neurological exam:  Mental status: Awake and alert.  Oriented to person, place, time and situation. Recent and remote memory appear to be intact.   Speech/Language: Fluent and appropriate. No dysarthria or aphasia on conversation. Able to follow complex commands.   Cranial nerves (II-XII): Visual fields full. Pupils equal round and reactive to light, extraocular movements intact, no ptosis, no nystagmus. Facial sensation and symmetry intact bilaterally. Hearing grossly intact. Palate mobile and midline. Shoulder shrug normal bilaterally. Normal tongue protrusion.   Motor: 5 out of 5 strength throughout the upper and lower extremities bilaterally except 5- in L IO. Normal bulk and tone. No abnormal movements or drift.   Sensation: Intact to light touch. Decreased to pin in patchy region of L " anterior FA proximal to the wrist. Intact to vibration. Normal proprioception.   DTR: 2+ at the biceps & BR. 3+ at the knees. No clonus at the ankles. Neg Moreno's bilaterally.   Coordination: Finger-nose-finger testing intact bilaterally. HANK normal bilaterally. No tremor. Romberg absent.   Gait: Narrow gait, appears to be almost tandem walking with casual gait. Able to heel and toe walk.       DIAGNOSTIC DATA:  I have personally reviewed provider notes, labs and imaging made available to me today.     Imaging:  MRI brain w wo 9/12/23:  FINDINGS:  Ventricles and sulci are normal in size for age without evidence of hydrocephalus.     The brain parenchyma appears within normal limits. Incidental note is made of an 8 mm pineal cyst.  Diffusion-weighted images demonstrate no evidence of an acute infarct.   Susceptibility weighted images demonstrate no evidence of acute or chronic hemorrhage. No mass effect or midline shift.     No extra-axial blood or fluid collections.     No abnormal intracranial enhancement.     Normal vascular flow voids are preserved.     Bone marrow signal intensity is normal.  The paranasal sinuses are normal.  The visualized portions of the mastoids are unremarkable.  Orbits are unremarkable.     Impression:  No evidence of an acute abnormality or abnormal enhancement.    Cardiac:  Holter 6/2022:  The underlying rhythm is sinus  Rare PAC's were seen but no sustained arrhythmias or pauses  Normal cardiac event monitor    Labs:  Lab Results   Component Value Date    WBC 6.43 04/20/2023    HGB 12.7 04/20/2023    HCT 39.1 04/20/2023     04/20/2023    MCV 89 04/20/2023    RDW 13.9 04/20/2023     Lab Results   Component Value Date     04/20/2023    K 4.8 04/20/2023     04/20/2023    CO2 31 (H) 04/20/2023    BUN 16 04/20/2023    CREATININE 0.6 09/07/2023    GLU 85 04/20/2023    CALCIUM 10.1 04/20/2023     Lab Results   Component Value Date    PROT 7.5 04/20/2023    ALBUMIN 4.5  "04/20/2023    BILITOT 0.3 04/20/2023    AST 19 04/20/2023    ALKPHOS 49 (L) 04/20/2023    ALT 19 04/20/2023     No results found for: "INR", "PROTIME", "PTT"  Lab Results   Component Value Date    CHOL 209 (H) 04/21/2021    HDL 77 (H) 04/21/2021    LDLCALC 123.8 04/21/2021    TRIG 41 04/21/2021    CHOLHDL 36.8 04/21/2021     No results found for: "LABA1C", "HGBA1C"   No results found for: "IIAGFYHG49"  Lab Results   Component Value Date    FOLATE 15.9 09/07/2023     Lab Results   Component Value Date    TSH 1.484 09/07/2023     Outside labs provided by patient:  TSH not done  SULEIMAN normal   Magnesium normal  B12 627  Hormones normal   Vit D low      Component      Latest Ref Rng 9/7/2023   Protein, Serum      6.0 - 8.4 g/dL 7.0    Albumin grams/dl      3.35 - 5.55 g/dL 4.37    Alpha-1 grams/dl      0.17 - 0.41 g/dL 0.25    Alpha-2      0.43 - 0.99 g/dL 0.67    Beta      0.50 - 1.10 g/dL 0.74    Gamma      0.67 - 1.58 g/dL 0.97    Cytoplasmic Neutrophilic Ab      <1:20 Titer <1:20    Perinuclear (P-ANCA)      <1:20 Titer <1:20    Anti-SSA Antibody      0.00 - 0.99 Ratio 0.07    Anti-SSA Interpretation      Negative  Negative    Anti-SSB Antibody      0.00 - 0.99 Ratio 0.13    Anti-SSB Interpretation      Negative  Negative    Folate      4.0 - 24.0 ng/mL 15.9    Methlymalonic Acid      <0.40 umol/L 0.18    RPR      Non-reactive  Non-reactive    TSH      0.400 - 4.000 uIU/mL 1.484    Thiamine      38 - 122 ug/L 72    CRP      0.0 - 8.2 mg/L 1.5    Sed Rate      0 - 36 mm/Hr 4    Vitamin B6      5 - 50 ug/L 26    Zinc, Serum-ALT      60 - 130 ug/dL 60    Immunofix Interp. SEE COMMENT    Pathologist Interpretation JENNIFER REVIEWED    Pathologist Interpretation SPE REVIEWED        ASSESSMENT & PLAN:  Sonido Loera is a 58 y.o.  female seen in f/u for multiple neurological concerns.     Problem List Items Addressed This Visit          Neuro    Multiple neurological symptoms - Primary    Current Assessment & Plan     MRI brain " w/wo personally reviewed with pt. Shows incidental pineal cyst, but otherwise is grossly normal.   Serologies also unremarkable   On exam, she has brisk reflexes at the knees, but no pathological reflexes. Aside from subtle IO weakness and small patch reported sensory change in L FA, exam non focal. With the urinary symptoms and pain in the mid back, must consider possible T spine lesion. Less likely, distal C spine. Will proceed with MRIs of the C and T spine w wo. If negative, will obtain EMG NCS of the L extremities.    questions if her symptoms could be long COVID syndrome. This would be a diagnosis of exclusion.           Other Visit Diagnoses       Demyelinating disease of central nervous system, unspecified        Hyperreflexia        Urinary retention        Paresthesias        Cervicalgia                  Follow up: after testing     I spent a total of 50 minutes on the day of the visit.    This includes face to face time with the patient, as well as non-face to face time preparing for and completing the visit (review of prior diagnostic testing and clinical notes, obtaining or reviewing history, documenting clinical information in the EMR, independently interpreting and communicating results to the patient/family and coordinating ongoing care).       I appreciate the opportunity to participate in the care of this patient. Please feel free to contact me with any concerns or questions.       Liseth Wilder, [facility]  [facility]  [address]  [place] [address]

## 2023-10-05 ENCOUNTER — PATIENT MESSAGE (OUTPATIENT)
Dept: NEUROLOGY | Facility: CLINIC | Age: 58
End: 2023-10-05
Payer: OTHER GOVERNMENT

## 2023-10-05 ENCOUNTER — HOSPITAL ENCOUNTER (OUTPATIENT)
Dept: RADIOLOGY | Facility: HOSPITAL | Age: 58
Discharge: HOME | End: 2023-10-05
Attending: NURSE PRACTITIONER
Payer: OTHER GOVERNMENT

## 2023-10-05 DIAGNOSIS — G37.9 DEMYELINATING DISEASE OF CENTRAL NERVOUS SYSTEM, UNSPECIFIED: ICD-10-CM

## 2023-10-05 DIAGNOSIS — R29.2 HYPERREFLEXIA: ICD-10-CM

## 2023-10-05 PROCEDURE — 72156 MRI NECK SPINE W/O & W/DYE: CPT | Mod: TC,PO

## 2023-10-05 PROCEDURE — 25500020 PHARM REV CODE 255: Mod: PO | Performed by: NURSE PRACTITIONER

## 2023-10-05 PROCEDURE — 72157 MRI CHEST SPINE W/O & W/DYE: CPT | Mod: TC,PO

## 2023-10-05 PROCEDURE — 72156 MRI NECK SPINE W/O & W/DYE: CPT | Mod: 26,,, | Performed by: RADIOLOGY

## 2023-10-05 PROCEDURE — 72157 MRI THORACIC SPINE W WO CONTRAST: ICD-10-PCS | Mod: 26,,, | Performed by: RADIOLOGY

## 2023-10-05 PROCEDURE — A9585 GADOBUTROL INJECTION: HCPCS | Mod: PO | Performed by: NURSE PRACTITIONER

## 2023-10-05 PROCEDURE — 72157 MRI CHEST SPINE W/O & W/DYE: CPT | Mod: 26,,, | Performed by: RADIOLOGY

## 2023-10-05 PROCEDURE — 72156 MRI CERVICAL SPINE DEMYELINATING W W/O CONTRAST: ICD-10-PCS | Mod: 26,,, | Performed by: RADIOLOGY

## 2023-10-05 RX ORDER — GADOBUTROL 604.72 MG/ML
7 INJECTION INTRAVENOUS
Status: COMPLETED | OUTPATIENT
Start: 2023-10-05 | End: 2023-10-05

## 2023-10-05 RX ADMIN — GADOBUTROL 7 ML: 604.72 INJECTION INTRAVENOUS at 02:10

## 2023-10-09 ENCOUNTER — TELEPHONE (OUTPATIENT)
Dept: HEMATOLOGY/ONCOLOGY | Facility: CLINIC | Age: 58
End: 2023-10-09

## 2023-10-09 DIAGNOSIS — Z17.0 MALIGNANT NEOPLASM OF LEFT BREAST IN FEMALE, ESTROGEN RECEPTOR POSITIVE, UNSPECIFIED SITE OF BREAST: ICD-10-CM

## 2023-10-09 DIAGNOSIS — C50.912 MALIGNANT NEOPLASM OF LEFT BREAST IN FEMALE, ESTROGEN RECEPTOR POSITIVE, UNSPECIFIED SITE OF BREAST: ICD-10-CM

## 2023-10-09 DIAGNOSIS — R93.7 ABNORMAL MRI, THORACIC SPINE: Primary | ICD-10-CM

## 2023-10-09 DIAGNOSIS — R91.1 PULMONARY NODULE: ICD-10-CM

## 2023-10-09 DIAGNOSIS — M54.9 BACK PAIN, UNSPECIFIED BACK LOCATION, UNSPECIFIED BACK PAIN LATERALITY, UNSPECIFIED CHRONICITY: ICD-10-CM

## 2023-10-09 NOTE — TELEPHONE ENCOUNTER
Spoke with the patient regarding the results of the thoracic spine MRI and concern for bone mets. Will get PET scan and bone scan to rule out bone mets and will see her in clinic with Dr. Nam after. She verbalized understanding and will call or message me to schedule an appt after scheduling the scans

## 2023-10-10 ENCOUNTER — PATIENT MESSAGE (OUTPATIENT)
Dept: NEUROLOGY | Facility: CLINIC | Age: 58
End: 2023-10-10
Payer: OTHER GOVERNMENT

## 2023-10-12 ENCOUNTER — PATIENT MESSAGE (OUTPATIENT)
Dept: HEMATOLOGY/ONCOLOGY | Facility: CLINIC | Age: 58
End: 2023-10-12

## 2023-10-13 ENCOUNTER — HOSPITAL ENCOUNTER (OUTPATIENT)
Dept: RADIOLOGY | Facility: HOSPITAL | Age: 58
Discharge: HOME | End: 2023-10-13
Attending: NURSE PRACTITIONER
Payer: OTHER GOVERNMENT

## 2023-10-13 DIAGNOSIS — C50.912 MALIGNANT NEOPLASM OF LEFT BREAST IN FEMALE, ESTROGEN RECEPTOR POSITIVE, UNSPECIFIED SITE OF BREAST: ICD-10-CM

## 2023-10-13 DIAGNOSIS — R91.1 PULMONARY NODULE: ICD-10-CM

## 2023-10-13 DIAGNOSIS — Z17.0 MALIGNANT NEOPLASM OF LEFT BREAST IN FEMALE, ESTROGEN RECEPTOR POSITIVE, UNSPECIFIED SITE OF BREAST: ICD-10-CM

## 2023-10-13 DIAGNOSIS — R93.7 ABNORMAL MRI, THORACIC SPINE: ICD-10-CM

## 2023-10-13 DIAGNOSIS — M54.9 BACK PAIN, UNSPECIFIED BACK LOCATION, UNSPECIFIED BACK PAIN LATERALITY, UNSPECIFIED CHRONICITY: ICD-10-CM

## 2023-10-13 PROCEDURE — 78306 BONE IMAGING WHOLE BODY: CPT | Mod: TC

## 2023-10-13 PROCEDURE — A9503 TC99M MEDRONATE: HCPCS

## 2023-10-18 ENCOUNTER — HOSPITAL ENCOUNTER (OUTPATIENT)
Dept: RADIOLOGY | Facility: HOSPITAL | Age: 58
Discharge: HOME | End: 2023-10-18
Attending: NURSE PRACTITIONER
Payer: OTHER GOVERNMENT

## 2023-10-18 VITALS — HEIGHT: 68 IN | WEIGHT: 165 LBS | BODY MASS INDEX: 25.01 KG/M2

## 2023-10-18 DIAGNOSIS — R93.7 ABNORMAL MRI, THORACIC SPINE: ICD-10-CM

## 2023-10-18 DIAGNOSIS — M54.9 BACK PAIN, UNSPECIFIED BACK LOCATION, UNSPECIFIED BACK PAIN LATERALITY, UNSPECIFIED CHRONICITY: ICD-10-CM

## 2023-10-18 DIAGNOSIS — R91.1 PULMONARY NODULE: ICD-10-CM

## 2023-10-18 DIAGNOSIS — C50.912 MALIGNANT NEOPLASM OF LEFT BREAST IN FEMALE, ESTROGEN RECEPTOR POSITIVE, UNSPECIFIED SITE OF BREAST: ICD-10-CM

## 2023-10-18 DIAGNOSIS — Z17.0 MALIGNANT NEOPLASM OF LEFT BREAST IN FEMALE, ESTROGEN RECEPTOR POSITIVE, UNSPECIFIED SITE OF BREAST: ICD-10-CM

## 2023-10-18 LAB — GLUCOSE SERPL-MCNC: 76 MG/DL (ref 70–110)

## 2023-10-18 PROCEDURE — A9552 F18 FDG: HCPCS | Mod: PO

## 2023-10-19 ENCOUNTER — TELEPHONE (OUTPATIENT)
Dept: HEMATOLOGY/ONCOLOGY | Facility: CLINIC | Age: 58
End: 2023-10-19

## 2023-10-20 ENCOUNTER — TELEPHONE (OUTPATIENT)
Dept: HEMATOLOGY/ONCOLOGY | Facility: CLINIC | Age: 58
End: 2023-10-20

## 2023-10-20 ENCOUNTER — PATIENT MESSAGE (OUTPATIENT)
Dept: HEMATOLOGY/ONCOLOGY | Facility: CLINIC | Age: 58
End: 2023-10-20

## 2023-10-20 DIAGNOSIS — R94.6 ABNORMAL THYROID SCAN: Primary | ICD-10-CM

## 2023-11-08 ENCOUNTER — OFFICE VISIT (OUTPATIENT)
Dept: PHYSICAL MEDICINE AND REHAB | Facility: CLINIC | Age: 58
End: 2023-11-08
Payer: OTHER GOVERNMENT

## 2023-11-08 VITALS — BODY MASS INDEX: 24.99 KG/M2 | HEIGHT: 68 IN | WEIGHT: 164.88 LBS

## 2023-11-08 DIAGNOSIS — R20.2 PARESTHESIA OF LEFT ARM AND LEG: ICD-10-CM

## 2023-11-08 DIAGNOSIS — M54.2 CERVICALGIA: ICD-10-CM

## 2023-11-08 PROCEDURE — 95910 PR NERVE CONDUCTION STUDY; 7-8 STUDIES: ICD-10-PCS | Mod: 26,S$PBB,, | Performed by: PHYSICAL MEDICINE & REHABILITATION

## 2023-11-08 PROCEDURE — 99999 PR PBB SHADOW E&M-EST. PATIENT-LVL II: CPT | Mod: PBBFAC,,, | Performed by: PHYSICAL MEDICINE & REHABILITATION

## 2023-11-08 PROCEDURE — 95886 PR EMG COMPLETE, W/ NERVE CONDUCTION STUDIES, 5+ MUSCLES: ICD-10-PCS | Mod: 26,S$PBB,, | Performed by: PHYSICAL MEDICINE & REHABILITATION

## 2023-11-08 PROCEDURE — 99499 UNLISTED E&M SERVICE: CPT | Mod: S$PBB,,, | Performed by: PHYSICAL MEDICINE & REHABILITATION

## 2023-11-08 PROCEDURE — 95910 NRV CNDJ TEST 7-8 STUDIES: CPT | Mod: PBBFAC,PO | Performed by: PHYSICAL MEDICINE & REHABILITATION

## 2023-11-08 PROCEDURE — 99999 PR PBB SHADOW E&M-EST. PATIENT-LVL II: ICD-10-PCS | Mod: PBBFAC,,, | Performed by: PHYSICAL MEDICINE & REHABILITATION

## 2023-11-08 PROCEDURE — 95886 MUSC TEST DONE W/N TEST COMP: CPT | Mod: PBBFAC,PO | Performed by: PHYSICAL MEDICINE & REHABILITATION

## 2023-11-08 PROCEDURE — 99212 OFFICE O/P EST SF 10 MIN: CPT | Mod: PBBFAC,PO | Performed by: PHYSICAL MEDICINE & REHABILITATION

## 2023-11-08 PROCEDURE — 95910 NRV CNDJ TEST 7-8 STUDIES: CPT | Mod: 26,S$PBB,, | Performed by: PHYSICAL MEDICINE & REHABILITATION

## 2023-11-08 PROCEDURE — 95886 MUSC TEST DONE W/N TEST COMP: CPT | Mod: 26,S$PBB,, | Performed by: PHYSICAL MEDICINE & REHABILITATION

## 2023-11-08 PROCEDURE — 99499 NO LOS: ICD-10-PCS | Mod: S$PBB,,, | Performed by: PHYSICAL MEDICINE & REHABILITATION

## 2023-11-08 NOTE — PROGRESS NOTES
[facility]  [address]  [place] [address]             Full Name: Sonido Loera YOB: 1965  MRN: [id number]  History: Left arm and leg pain and numbness. NO neck or back pain. Hx of breast cancer s/p chemo and radiation.       Visit Date: 11/8/2023 9:58 AM  Age: 58 Years  Examining Physician: Tana Gibson DO  Referring Physician: Liseth Wilder      Sensory NCS      Nerve / Sites Rec. Site Onset Lat Peak Lat NP Amp PP Amp Segments Distance Velocity     ms ms µV µV  cm m/s   L Median - Digit III (Antidromic)      Wrist Dig III 2.75 3.46 29.8 46.2 Wrist - Dig III 14 51   L Ulnar - Digit V (Antidromic)      Wrist Dig V 2.73 3.67 36.0 54.0 Wrist - Dig V 14 51   L Radial - Anatomical snuff box (Forearm)      Forearm Wrist 1.92 2.56 18.1 21.5 Forearm - Wrist 10 52   L Sural - Ankle (Calf)      Calf Ankle 2.96 3.85 15.0 7.1 Calf - Ankle 14 47       Motor NCS      Nerve / Sites Muscle Latency Amplitude Amp % Duration Segments Distance Lat Diff Velocity     ms mV % ms  cm ms m/s   L Median - APB      Wrist APB 3.92 6.4 100 7.42 Wrist - APB 8        Elbow APB 7.38 6.2 97 7.23 Elbow - Wrist 21 3.46 61   L Ulnar - ADM      Wrist ADM 3.06 6.9 100 9.96 Wrist - ADM 8        B.Elbow ADM 7.02 7.6 110 9.13 B.Elbow - Wrist 23 3.96 58      A.Elbow ADM 8.85 4.4 63.9 8.71 A.Elbow - B.Elbow 10 1.83 55   L Peroneal - EDB      Ankle EDB 4.27 4.1 100 6.58 Ankle - EDB 8        Fib head EDB 10.75 3.4 83.5 7.19 Fib head - Ankle 33 6.48 51      Pop fossa EDB 12.06 5.4 132 7.31 Pop fossa - Fib head 10 1.31 76       EMG Summary Table     Spontaneous MUAP Recruitment   Muscle IA Fib PSW Fasc CRD Amp Dur. Poly Pattern   L. Deltoid N None None None None N N None N   L. Biceps brachii N None None None None N N None N   L. Triceps brachii N None None None None N N None N   L. Pronator teres N None None None None N N None N   L. First dorsal interosseous N None None None None N N None N   L. Quadriceps N None None  None None N N None N   L. Tibialis anterior N None None None None N N None N   L. Peroneus longus N None None None None N N None N   L. Gastrocnemius (Medial head) N None None None None N N None N   L. Gluteus medius N None None None None N N None N       Summary    The motor conduction test was normal in all 3 of the tested nerves: L Median - APB, L Ulnar - ADM, L Peroneal - EDB.    The sensory conduction test was normal in all 4 of the tested nerves: L Median - Digit III (Antidromic), L Ulnar - Digit V (Antidromic), L Radial - Anatomical snuff box (Forearm), L Sural - Ankle (Calf).    The needle EMG study was normal in all 10 tested muscles: L. Deltoid, L. Biceps brachii, L. Triceps brachii, L. Pronator teres, L. First dorsal interosseous, L. Quadriceps, L. Tibialis anterior, L. Peroneus longus, L. Gastrocnemius (Medial head), L. Gluteus medius.       Impression:  Normal study.  No electrophysiologic evidence of left cervical or left lumbosacral radiculopathy/plexopathy, left median mononeuropathy, left ulnar mononeuropathy, or peripheral neuropathy in the left upper or left lower extremity.    ------------------------------  Tana Gibson, DO

## 2023-11-13 ENCOUNTER — HOSPITAL ENCOUNTER (OUTPATIENT)
Dept: RADIOLOGY | Facility: HOSPITAL | Age: 58
Discharge: HOME | End: 2023-11-13
Attending: NURSE PRACTITIONER
Payer: OTHER GOVERNMENT

## 2023-11-13 DIAGNOSIS — R94.6 ABNORMAL THYROID SCAN: ICD-10-CM

## 2023-11-13 PROCEDURE — 76536 US EXAM OF HEAD AND NECK: CPT | Mod: TC,PO

## 2023-11-14 ENCOUNTER — TELEPHONE (OUTPATIENT)
Dept: HEMATOLOGY/ONCOLOGY | Facility: CLINIC | Age: 58
End: 2023-11-14

## 2023-11-14 NOTE — TELEPHONE ENCOUNTER
----- Message from EDDIE Joe sent at 11/14/2023 10:12 AM CST -----  Regarding: FW:   Please let her know the thyroid nodule hasnt changed since 2019      Mali  ----- Message -----  From: Interface, Rad Results In  Sent: 11/13/2023  11:39 AM CST  To: EDDIE Joe

## 2023-11-14 NOTE — TELEPHONE ENCOUNTER
LVM asking pt to return my call in regards to results.     Spoke with pt. She verbalized understanding.

## 2024-01-02 ENCOUNTER — TELEPHONE (OUTPATIENT)
Dept: RHEUMATOLOGY | Facility: CLINIC | Age: 59
End: 2024-01-02
Payer: OTHER GOVERNMENT

## 2024-01-02 NOTE — TELEPHONE ENCOUNTER
----- Message from Jimbo Mcneill sent at 1/2/2024 11:08 AM CST -----  Type: Needs Medical Advice  Who Called:  Patient    Best Call Back Number: [phone]  Additional Information: Patient states that she would like a callback regarding her recent body scan and her inflamed joints

## 2024-01-08 NOTE — TELEPHONE ENCOUNTER
I am absolutely accepting new patients for 2024. Thanks for checking first.  This is fine. We have every 9am Tue-Friday that I am here. Dr. SMITH

## 2024-02-01 ENCOUNTER — TELEPHONE (OUTPATIENT)
Dept: HEMATOLOGY/ONCOLOGY | Facility: CLINIC | Age: 59
End: 2024-02-01
Payer: OTHER GOVERNMENT

## 2024-02-01 NOTE — TELEPHONE ENCOUNTER
----- Message from Eryn Barillas sent at 2/1/2024  1:10 PM CST -----  Contact: pt  Type: Needs Medical Advice  Who Called:  pt  Best Call Back Number: [phone]    Additional Information: Pt is calling the office trying to schedule an appt.Please call back and advise.

## 2024-02-01 NOTE — TELEPHONE ENCOUNTER
Returned call to pt.  Hx breast cancer.  She stated she had breast surgery in 2009 and chemo and radiation ended in 2010; done in [place].   Pt is now seeing Dr Namhemvirgilio at [facility] (in [facility]).   She is requesting to change [facility] providers for her routine breast follow ups to here at [facility].  Pt said with her  she does not need a referral.   Informed her that our breast navigator will be calling her back to schedule; and she verbalized understanding.

## 2024-02-05 NOTE — NURSING
Spoke with pt.  She has a hx of breast cancer.  2009 - lumpectomy and received chemo & radiation.  She is on yearly surveillance and wants to transfer care here.  She is due for mammo and f/u visit in April.  Scheduled her to see Dr. Stone on 4/17/24.  She will bring her records.  Reviewed location and contact information.  Will call her regarding a mammo prior to her appt.  Asked her to call with any questions or concerns.  She thanked me and verbalized understanding.    No episodes on Telemetry

## 2024-02-06 DIAGNOSIS — Z85.3 HISTORY OF BREAST CANCER: Primary | ICD-10-CM

## 2024-02-06 NOTE — NURSING
Per Dr. Stone.  A screening mammogram is good.  Notified pt.  She will call [facility] to schedule appt in April.  Asked her to call with any questions or concerns.  She thanked me and verbalized understanding.

## 2024-02-07 DIAGNOSIS — Z85.3 HISTORY OF BREAST CANCER: Primary | ICD-10-CM

## 2024-03-12 ENCOUNTER — OFFICE VISIT (OUTPATIENT)
Dept: RHEUMATOLOGY | Facility: CLINIC | Age: 59
End: 2024-03-12
Payer: OTHER GOVERNMENT

## 2024-03-12 VITALS
BODY MASS INDEX: 25.77 KG/M2 | HEART RATE: 71 BPM | WEIGHT: 170.06 LBS | SYSTOLIC BLOOD PRESSURE: 154 MMHG | DIASTOLIC BLOOD PRESSURE: 79 MMHG | HEIGHT: 68 IN

## 2024-03-12 DIAGNOSIS — M79.7 FIBROMYALGIA: Primary | ICD-10-CM

## 2024-03-12 DIAGNOSIS — M15.9 OSTEOARTHRITIS OF MULTIPLE JOINTS, UNSPECIFIED OSTEOARTHRITIS TYPE: ICD-10-CM

## 2024-03-12 DIAGNOSIS — M15.4 EROSIVE OSTEOARTHRITIS: ICD-10-CM

## 2024-03-12 PROCEDURE — 99213 OFFICE O/P EST LOW 20 MIN: CPT | Mod: PBBFAC,PN | Performed by: INTERNAL MEDICINE

## 2024-03-12 PROCEDURE — 99999 PR PBB SHADOW E&M-EST. PATIENT-LVL III: CPT | Mod: PBBFAC,,, | Performed by: INTERNAL MEDICINE

## 2024-03-12 PROCEDURE — 99204 OFFICE O/P NEW MOD 45 MIN: CPT | Mod: S$PBB,,, | Performed by: INTERNAL MEDICINE

## 2024-03-12 RX ORDER — TURMERIC 400 MG
CAPSULE ORAL
COMMUNITY

## 2024-03-12 NOTE — PATIENT INSTRUCTIONS
Nondrug Therapies    Exercise    Movement is an essential part of an OA treatment plan. Getting 150 minutes of moderate-to-vigorous exercise per week should be the goal, according to the U.S. Department of Health and Human Services. A good exercise program to fight OA pain and stiffness has four parts:    Strengthening exercises build muscles around painful joints and helps to ease the stress on them.   Range-of-motion exercise or stretching helps to reduce stiffness and keep joints moving.  Aerobic or cardio exercises help improve stamina and energy levels and reduce excess weight.  Balance exercises help strengthen small muscles around the knees and ankles and help prevent falls.      Numerous natural supplements are promoted for treating osteoarthritis (OA), from glucosamine and chondroitin to curcumin. Some are better than others at easing pain and stiffness. Check with your doctor before trying any of these products - many have some side effects and some may interfere with your medications. Use them only as part of a comprehensive OA management plan that includes exercising regularly, maintaining a healthy weight and protecting your joints.    Some common supplements we recommend:   Move Free Ultra with or without Turmeric depending on your medications  Pawel Naturally CuraMed or Curamin,   Curcumin     Curcumin is the active compound in the yellow-hued spice, turmeric, which is a staple of orderbolt. In the body, it acts as a powerful anti-inflammatory agent, blocking the same inflammation-promoting enzyme as the MARTINEZ-2 inhibitor drug, celecoxib.     In a study of 367 people with knee OA, a 1,500 mg daily dose of curcumin extract was as effective as 1,200 mg a day of ibuprofen, without the gastrointestinal side effects. This supplement also appears to relieve RA swelling and tenderness.   I encourage all patients to take at least 500mg daily   Avoid if taking blood thinners like coumadin, Eliquis, Plavix.      Glucosamine and Chondroitin      Glucosamine and chondroitin are two of the most commonly used supplements for arthritis. They're components of cartilage--the substance that cushions the joints.     Research on these supplements has been mixed, in part because studies have used varying designs and supplement types. A large National Institutes of Health study called the GAIT trial compared glucosamine and chondroitin, alone or together, with an NSAID and inactive treatment (placebo) in people with knee osteoarthritis (OA). Glucosamine improved symptoms like pain and function, but not much better than a placebo. Yet a 2016 international trial found the combination to be as effective as the NSAID celecoxib at reducing pain, stiffness and swelling in knee OA.     Studies have also differed on which form of the supplements is most effective. Some evidence suggests glucosamine sulfate is best. Others find glucosamine hydrochloride to be more effective. One study that compared the two forms head to head showed they offered equivalent pain relief. [facility] researchers say evidence supports trying glucosamine sulfate - not hydrochloride - with or without chondroitin sulfate for knee OA.      Do not take if taking anticoagulants or Plavix without speaking to your physician.     Undenatured type II collagen (UC-II)    Collagen is the structural protein in skin, tendons, and bones. Collagen type II is the form in the cartilage lining the joints. Undenatured type II collagen (UC-II) supplements come from the breastbone of chickens. One study showed that UC-11 improved pain, stiffness, and function in knee OA better than a placebo, and slightly better than glucosamine and chondroitin supplements. Additional research is needed to confirm whether this supplement is effective for OA.         Fish Oil  Fish oil is loaded with the omega-3 fatty acids DHA and EPA, which help bring down inflammation in the body. These supplements  have the potential to protect the heart and brain, which may be why they're so widely used. However, there's no clear evidence that fish oil does anything to improve OA symptoms. In fact, based on the quality of evidence available, the American College of Rheumatology (ACR) does not recommend using it for hand, hip or knee OA.        Methylsulfonylmethane (MSM)  MSM, a compound found in green fruits and vegetables, is used in the body to maintain and repair connective tissue, and it may have anti-inflammatory properties. Some studies found it may improve pain and function in knee OA, and a recent review says that, despite poor quality of studies, limited evidence does support using it for OA. Doses used in the studies ranged from 1.5 g to 6 g per day. MSM can cause side effects, including allergic reactions.           Boswellia juan pablo  This plant extract has been used in Ayurvedic medicine for centuries to treat inflammatory diseases. In studies, proprietary extracts of Boswellia juan pablo (5-Loxin, Aflapin) temporarily reduced inflammation and pain and disability in knee OA. A systematic review found noteworthy effects in easing OA symptoms, although the quality of the evidence was weak. Minor side effects included nausea, headache, fever, diarrhea, abdominal pain and general weakness.

## 2024-03-12 NOTE — PROGRESS NOTES
Subjective:          Chief Complaint: Sonido Loera is a 58 y.o. female who had concerns including Osteoarthritis.    HPI:  Neurologic evaluation for very diffuse and widespread tingling as well as chest wall pain patient had an MRI of her T-spine T-spine showed some abnormalities on vertebral bodies warranted a follow-up with Dr. Morgan receive her hematology oncologist for history of breast cancer she had a PET scan as well as a bone scan bone scan findings relevant to Rheumatology below  2. Chronic degenerative uptake of the bilateral shoulders, elbows, wrists, knees, and bilateral ankles. There is focal hypermetabolic activity along the axis of the lateral margin of the right midfoot region may be on a posttraumatic basis. Electronically signed by:  Kwabena Key MD  10/13/2023 01:07 PM CDT Workstation: [id number]    This is consistent with her hx of degenerative arthritis of various joints.   Patient did have a bit of autoimmune workup including an ANCA which was negative a sed rate C-reactive protein negative SSA and SSB also negative this pretty standard for paresthesias from the neurology department. Patient did have a nerve conduction study 11/08/2023 as normal study.    Patient today with 3/10 pain various joints. No specific swelling s/p CMC arthrotomy b/l, some pain in knees.     Last HPI from 2020:   Patient is a 50 y/o female with aches and pains for approximately 4 years in joints and long bones. INtermittent. 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 Present for 22 years. Did have cholecystectomy years ago but did not resolve the issue. Twice/year she has severe pain.   Exacerbated with loss of sleep over a period of time.      Aleve 220mg BID    We trialed gabapentin low dose, but she developed dizziness/vertigo. She has hx of vertigo type migraine, with sleep deprivation and gabapentin seemed to trigger this.   Doing very well with LDN-has  markedly improved her overall symptoms.     DIP nodes with intermittent pain continues  Hands are swollen more last 2 months.   But she noted her HAs were worsening, referred to Dr. Singh for occipital block,  but patient with with vasovagal syncope pre-procedure.     REVIEW OF SYSTEMS:    Review of Systems   Constitutional:  Positive for malaise/fatigue. Negative for fever and weight loss.   HENT:  Negative for sore throat.    Eyes:  Negative for double vision, photophobia and redness.   Respiratory:  Negative for cough, shortness of breath and wheezing.    Cardiovascular:  Negative for chest pain, palpitations and orthopnea.   Gastrointestinal:  Negative for abdominal pain, constipation and diarrhea.   Genitourinary:  Negative for dysuria, hematuria and urgency.   Musculoskeletal:  Positive for joint pain and myalgias. Negative for back pain.   Skin:  Negative for rash.   Neurological:  Negative for dizziness, tingling, focal weakness and headaches.   Endo/Heme/Allergies:  Does not bruise/bleed easily.   Psychiatric/Behavioral:  Negative for depression, hallucinations and suicidal ideas. The patient has insomnia.                Objective:            Past Medical History:   Diagnosis Date    Allergic rhinitis due to pollen     Breast cancer 2009    left lumpectomy invasive ductal (dx [place]) tamo    Closed nondisplaced fracture of fifth metatarsal bone of left foot with routine healing     2021// hx of right same area fracture 1999 (boot for both)    Colon polyp     Encounter for blood transfusion     after miscarriage     ER+ (estrogen receptor positive status) 04/25/2022    Erosive osteoarthritis 05/07/2020    Fibromyalgia     Headache     Hernia of abdominal wall     Liver cyst     Malignant neoplasm of left breast in female, estrogen receptor positive 04/25/2022    S/P chemotherapy, time since greater than 12 weeks 04/25/2022    S/P lumpectomy, left breast (2009) 04/25/2022    S/P radiotherapy 04/25/2022     Thyroid nodule     left nodule  stable    Umbilical hernia      Family History   Problem Relation Age of Onset    Rhinitis Daughter     Breast cancer Mother 82    Arthritis Mother     Cancer Mother     Breast cancer Maternal Aunt 78        mother's twin    Liver cancer Paternal Aunt     Cirrhosis Maternal Grandmother     Heart disease Father     Chronic infections Sister     No Known Problems Brother     AVM Sister     Urticaria Neg Hx     Immunodeficiency Neg Hx     Eczema Neg Hx     Asthma Neg Hx     Angioedema Neg Hx     Colon cancer Neg Hx     Crohn's disease Neg Hx     Ulcerative colitis Neg Hx     Esophageal cancer Neg Hx      Social History     Tobacco Use    Smoking status: Former     Current packs/day: 0.00     Average packs/day: 1 pack/day for 3.0 years (3.0 ttl pk-yrs)     Types: Cigarettes     Start date: 1980     Quit date: 1983     Years since quittin.1    Smokeless tobacco: Never    Tobacco comments:     Very brief part of late teens   Substance Use Topics    Alcohol use: Yes     Alcohol/week: 1.0 standard drink of alcohol     Types: 1 Glasses of wine per week     Comment: 1-2 times a year    Drug use: Never         Current Outpatient Medications on File Prior to Visit   Medication Sig Dispense Refill    ascorbic acid (VITAMIN C) 500 MG tablet Take 500 mg by mouth once daily.      b complex vitamins capsule Take 1 capsule by mouth once daily.      calcium carbonate (CALCIUM 500 ORAL) Take by mouth.      ergocalciferol, vitamin D2, (VITAMIN D ORAL) Take by mouth.      multivitamin (THERAGRAN) per tablet Take 1 tablet by mouth once daily.      turmeric 400 mg Cap Take by mouth.      EPINEPHrine (EPIPEN) 0.3 mg/0.3 mL AtIn Inject 0.3 mLs (0.3 mg total) into the muscle once. for 1 dose 2 each 0    naproxen sodium (ANAPROX) 220 MG tablet Take 220 mg by mouth every 12 (twelve) hours.       No current facility-administered medications on file prior to visit.       Vitals:    24 0900    BP: (!) 154/79   Pulse: 71       Physical Exam:    Physical Exam  Constitutional:       Appearance: She is well-developed.   Eyes:      General: Lids are normal.   Neurological:      Mental Status: She is oriented to person, place, and time.   Psychiatric:         Behavior: Behavior normal.         Thought Content: Thought content normal.               Assessment:       Encounter Diagnoses   Name Primary?    Fibromyalgia Yes    Erosive osteoarthritis     Osteoarthritis of multiple joints, unspecified osteoarthritis type             Plan:        Fibromyalgia    Erosive osteoarthritis    Osteoarthritis of multiple joints, unspecified osteoarthritis type        Very pleasant 59 y/o female with myalgia and polyarthralgia triggered with sleep deprivation. Does not have the typic tender points of FMS but highly suspect myofascial pain       OA hands-NSAIDs PRN> reviewed natural progression of OA hands and changes of the DIP joints. Discussed wrapping DIP joints PRN   LDN we thought for some time she was doing well but ultimately discerned that they exacerbated HA for her.   C/p CMC arthrotomy.   Reassurance that bone scan is all degenerative and can be managed with NSAID/Tylenol.     No follow-ups on file.        50 min consultation with greater than 50% spent in counseling, chart review and coordination of care. All questions answered.  Thank you for allowing me to participate in the care of this very pleasant patient.

## 2024-03-20 ENCOUNTER — OFFICE VISIT (OUTPATIENT)
Dept: OPTOMETRY | Facility: CLINIC | Age: 59
End: 2024-03-20
Payer: OTHER GOVERNMENT

## 2024-03-20 ENCOUNTER — TELEPHONE (OUTPATIENT)
Dept: OPTOMETRY | Facility: CLINIC | Age: 59
End: 2024-03-20
Payer: OTHER GOVERNMENT

## 2024-03-20 DIAGNOSIS — H10.9 CONJUNCTIVITIS OF LEFT EYE, UNSPECIFIED CONJUNCTIVITIS TYPE: Primary | ICD-10-CM

## 2024-03-20 DIAGNOSIS — H11.422 CHEMOSIS OF CONJUNCTIVA SUBCONJUNCTIVAL EDEMA, LEFT: ICD-10-CM

## 2024-03-20 PROCEDURE — 99203 OFFICE O/P NEW LOW 30 MIN: CPT | Mod: S$PBB,,, | Performed by: OPTOMETRIST

## 2024-03-20 PROCEDURE — 99999 PR PBB SHADOW E&M-EST. PATIENT-LVL III: CPT | Mod: PBBFAC,,, | Performed by: OPTOMETRIST

## 2024-03-20 PROCEDURE — 99213 OFFICE O/P EST LOW 20 MIN: CPT | Mod: PBBFAC,PO | Performed by: OPTOMETRIST

## 2024-03-20 RX ORDER — LOTEPREDNOL ETABONATE 5 MG/ML
SUSPENSION/ DROPS OPHTHALMIC
Qty: 5 ML | Refills: 1 | Status: SHIPPED | OUTPATIENT
Start: 2024-03-20 | End: 2024-04-17 | Stop reason: ALTCHOICE

## 2024-03-20 NOTE — PROGRESS NOTES
"HPI    Pt here for urgent visit after having FBS in OS for the past few months.   Pt states that it appears like part of eye is coming up. Denies flashes,   floaters present. No gtt  Last edited by Cristina Storey on 3/20/2024 12:59 PM.            Assessment /Plan     For exam results, see Encounter Report.    Conjunctivitis of left eye, unspecified conjunctivitis type  -     loteprednol (LOTEMAX) 0.5 % ophthalmic suspension; I gtt OS qid x 5 days, then bid x 5 days, then qam prn for inflammation  Dispense: 5 mL; Refill: 1    Chemosis of conjunctiva subconjunctival edema, left  -     loteprednol (LOTEMAX) 0.5 % ophthalmic suspension; I gtt OS qid x 5 days, then bid x 5 days, then qam prn for inflammation  Dispense: 5 mL; Refill: 1      1,2.   No K/conj/ tarsal fb noted  No abrasion / no injury     Area of bulbar conj inflammation w/ some redundant tissue sup / nasal canthus area and under lid   Does not appear as neoplastic growth  Not typical infectious / contagious appearance   Duration somewhat unknown, but fbs x "days to months"     No discharge, no vision change, no previous episode     Wears readers for near only     TIMOTEO ? Per pt     Short course lotemax as directed   Message with report in 1 week, if persistent, can consider referral to Dr Enriquez for eval                    "

## 2024-04-03 ENCOUNTER — TELEPHONE (OUTPATIENT)
Dept: SURGERY | Facility: CLINIC | Age: 59
End: 2024-04-03
Payer: OTHER GOVERNMENT

## 2024-04-03 NOTE — TELEPHONE ENCOUNTER
Returned patient's call -- MICHELLE Govea will be calling pt to discuss mammogram after 4/17 -- not needed prior to appointment with Dr Stone on 4/17    Answered questions to patient's satisfaction -- pt v/u of our call -- advised to call the clinic with any questions or concerns.      ----- Message from Mayra Magallanes sent at 4/3/2024 12:21 PM CDT -----  Type:  Needs Medical Advice    Who Called:  Pt    Would the patient rather a call back or a response via [facility]?  Call back or portal    Best Call Back Number:  [phone]    Additional Information:  Pt is needing to know if Dr Stone is needing mammogram before appt 4/17 or if it is okay to have it after. Insurance is saying it has to be after 4/17.  Please call back to advise. Thanks!

## 2024-04-17 ENCOUNTER — OFFICE VISIT (OUTPATIENT)
Dept: HEMATOLOGY/ONCOLOGY | Facility: CLINIC | Age: 59
End: 2024-04-17
Payer: OTHER GOVERNMENT

## 2024-04-17 VITALS
RESPIRATION RATE: 14 BRPM | BODY MASS INDEX: 25.66 KG/M2 | WEIGHT: 169.31 LBS | SYSTOLIC BLOOD PRESSURE: 126 MMHG | HEART RATE: 71 BPM | DIASTOLIC BLOOD PRESSURE: 80 MMHG | TEMPERATURE: 97 F | HEIGHT: 68 IN | OXYGEN SATURATION: 99 %

## 2024-04-17 DIAGNOSIS — C50.912 MALIGNANT NEOPLASM OF LEFT BREAST IN FEMALE, ESTROGEN RECEPTOR POSITIVE, UNSPECIFIED SITE OF BREAST: ICD-10-CM

## 2024-04-17 DIAGNOSIS — Z17.0 ER+ (ESTROGEN RECEPTOR POSITIVE STATUS): ICD-10-CM

## 2024-04-17 DIAGNOSIS — Z92.21 S/P CHEMOTHERAPY, TIME SINCE GREATER THAN 12 WEEKS: ICD-10-CM

## 2024-04-17 DIAGNOSIS — Z85.3 HISTORY OF BREAST CANCER: ICD-10-CM

## 2024-04-17 DIAGNOSIS — Z98.890 S/P LUMPECTOMY, LEFT BREAST: Primary | ICD-10-CM

## 2024-04-17 DIAGNOSIS — Z17.0 MALIGNANT NEOPLASM OF LEFT BREAST IN FEMALE, ESTROGEN RECEPTOR POSITIVE, UNSPECIFIED SITE OF BREAST: ICD-10-CM

## 2024-04-17 DIAGNOSIS — Z92.3 S/P RADIOTHERAPY: ICD-10-CM

## 2024-04-17 PROCEDURE — 99214 OFFICE O/P EST MOD 30 MIN: CPT | Mod: PBBFAC,PN | Performed by: INTERNAL MEDICINE

## 2024-04-17 PROCEDURE — 99203 OFFICE O/P NEW LOW 30 MIN: CPT | Mod: S$PBB,,, | Performed by: INTERNAL MEDICINE

## 2024-04-17 PROCEDURE — 99999 PR PBB SHADOW E&M-EST. PATIENT-LVL IV: CPT | Mod: PBBFAC,,, | Performed by: INTERNAL MEDICINE

## 2024-04-17 NOTE — PROGRESS NOTES
Name: Sonido Loera  MRN:  [id number]  :  1965 Age 58 y.o.  Date of Service: 2024    Reason for visit:  Sonido Loera is a 58 y.o. female here regarding...    # right breast invasive ductal carcinoma  Date of Original Diagnosis:   Original Stage:  T2N0M0, ER/OR positive  Current Sites of Disease:  None  Current Goals of Therapy:  Curative  Current Therapy:  None    Oncologic History/History of Present Illness:   Diagnosed in  with T2 N0 M0 ER/OR positive invasive ductal carcinoma, had lumpectomy followed by 3 cycles of Adriamycin and Cytoxan completed chemotherapy in 2010.  Had adjuvant breast radiation.  Took a short course of tamoxifen in  but had side effects including confusion thus she stopped the medication.  She has not been on any therapy since discontinuing tamoxifen nearly 14 years ago.  All of her initial care was done in [place].    She moved to [place] in     Previously seen by oncologist Dr. Lerner and Dr. Nam.     Transferred care to [facility] 2024.  Last mammogram 2023 with BI-RADS 1, she had a brain MRI in 2023 which was negative. Had PET scan and bone scan in 2023 which did not show any avidity or uptake respectively.     Feeling well though did have right-sided breast pain transiently a couple of weeks ago.    Past Medical History:   Diagnosis Date    Allergic rhinitis due to pollen     Breast cancer     left lumpectomy invasive ductal (dx [place]) tamo    Closed nondisplaced fracture of fifth metatarsal bone of left foot with routine healing     / hx of right same area fracture  (boot for both)    Colon polyp     Encounter for blood transfusion     after miscarriage     ER+ (estrogen receptor positive status) 2022    Erosive osteoarthritis 2020    Fibromyalgia     Headache     Hernia of abdominal wall     Liver cyst     Malignant neoplasm of left breast in female,  estrogen receptor positive 2022    S/P chemotherapy, time since greater than 12 weeks 2022    S/P lumpectomy, left breast () 2022    S/P radiotherapy 2022    Thyroid nodule     left nodule  stable    Umbilical hernia        Past Surgical History:   Procedure Laterality Date    BREAST LUMPECTOMY Left     with radiation and chemotherapy     SECTION      1    CHOLECYSTECTOMY      COLONOSCOPY  2015    Dr. Davenport, sent for scanning: one colon polyps removed, tortuous colon, large internal hemorrhoids; biopsy: tubular adenoma, repeat in 5 years for surveillance    ESOPHAGOGASTRODUODENOSCOPY N/A 2019    Procedure: EGD (ESOPHAGOGASTRODUODENOSCOPY);  Surgeon: Michael Hebert Jr., MD;  Location: [facility] ENDO;  Service: Endoscopy;  Laterality: N/A;    HERNIA REPAIR      REPAIR OF COLLATERAL LIGAMENT OF THUMB Bilateral      Dr Farr    ROBOT-ASSISTED LAPAROSCOPIC REPAIR OF INCISIONAL HERNIA N/A 10/07/2019    Procedure: ROBOTIC REPAIR, HERNIA, INCISIONAL;  Surgeon: Alexis Gonzalez MD;  Location: [facility] OR;  Service: General;  Laterality: N/A;    VEIN SURGERY Right     Varicose vein sclerotherapy       Allergies as of 2024 - Reviewed 2024   Allergen Reaction Noted    Gabapentin Other (See Comments) 2018    Tamoxifen analogues Other (See Comments) 2017    Adhesive Rash 2017       Family History   Problem Relation Name Age of Onset    Breast cancer Mother Shakira 82    Arthritis Mother Shakira     Cancer Mother Shakira     Heart disease Father Arnold     Chronic infections Sister      AVM Sister      No Known Problems Brother      Breast cancer Maternal Aunt  78        mother's twin    Liver cancer Paternal Aunt      Cirrhosis Maternal Grandmother      Rhinitis Daughter      Urticaria Neg Hx      Immunodeficiency Neg Hx      Eczema Neg Hx      Asthma Neg Hx      Angioedema Neg Hx      Colon cancer Neg Hx      Crohn's disease Neg Hx      Ulcerative  colitis Neg Hx      Esophageal cancer Neg Hx      Cataracts Neg Hx      Glaucoma Neg Hx      Macular degeneration Neg Hx         Social History     Tobacco Use    Smoking status: Former     Current packs/day: 0.00     Average packs/day: 1 pack/day for 3.0 years (3.0 ttl pk-yrs)     Types: Cigarettes     Start date: 1980     Quit date: 1983     Years since quittin.2    Smokeless tobacco: Never    Tobacco comments:     Very brief part of late teens   Substance Use Topics    Alcohol use: Yes     Alcohol/week: 1.0 standard drink of alcohol     Types: 1 Glasses of wine per week     Comment: 1-2 times a year    Drug use: Never         PHYSICAL EXAMINATION:  LMP 2011   Wt Readings from Last 3 Encounters:   24 77.2 kg (170 lb 1.4 oz)   23 74.8 kg (164 lb 14.5 oz)   10/18/23 74.8 kg (165 lb)     ECOG PERFORMANCE STATUS:  0  Physical Exam   General:  Well-appearing, nontoxic  Eyes:  Equal and round pupils, EOMI, no scleral icterus  Mouth:  No lesions, moist  Cardiovascular:  Warm, well-perfused, no peripheral edema  Lungs:  Unlabored on room air, no wheezing  Neurologic:  Awake, alert and oriented, participating in the exam  Psych:  Appropriate mood and affect  Skin:  Normal pallor, No rashes  Heme:  No petechiae, no purpura  Breasts: right breast normal without mass, skin or nipple changes or axillary nodes, left breast normal without mass, skin or nipple changes or axillary nodes, surgical scars noted   .     LABORATORY:  CBC  Lab Results   Component Value Date    WBC 6.43 2023    HGB 12.7 2023    HCT 39.1 2023    MCV 89 2023     2023         BMP  Lab Results   Component Value Date     2023    K 4.8 2023     2023    CO2 31 (H) 2023    BUN 16 2023    CREATININE 0.6 2023    CALCIUM 10.1 2023    ANIONGAP 9 2023    ESTGFRAFRICA >60.0 2022    EGFRNONAA >60.0 2022          PATHOLOGY:        RADIOLOGY:  PET scan 10/18/23  Impression:  No evidence of FDG avid metastatic disease.  Sclerotic lesion involving rightward aspect of T1 vertebral body is not FDG avid.  No PET/CT correlate for previously described T5 vertebral body lesion.    Bone scan 10/13/2023  IMPRESSION:  1. No evidence of concerning areas of hypermetabolic activity throughout the osseous skeleton. Referenced focus by spine MRI the thoracic spine is not appreciated on the current examination.  2. Chronic degenerative uptake of the bilateral shoulders, elbows, wrists, knees, and bilateral ankles. There is focal hypermetabolic activity along the axis of the lateral margin of the right midfoot region may be on a posttraumatic basis    MRI brain 09/12/2023  Impression:   No evidence of an acute abnormality or abnormal enhancement.    ASSESSMENT AND PLAN:  Sonido Loera is a 58 y.o. female with...    # right breast invasive ductal carcinoma  Date of Original Diagnosis: 2009  Original Stage:  T2N0M0, ER/TX positive  Current Sites of Disease:  None  Current Goals of Therapy:  Curative  Current Therapy:  None    -she completed a very short course of endocrine therapy then discontinued as noted in the HPI.  She had complete restaging imaging in 2023 as noted above year which showed no evidence of metastatic disease.  I recommend no further scanning outside of annual mammograms in the absence of symptoms.  Moving forward she should have symptom based scanning.      Ordered her annual mammogram today at [facility]    Given she is nearly 15 years out from her diagnosis, she can be followed by her PCP  with good clinical breast exams and annual mammograms.  She no longer needs to see an oncology physician.    I spent greater than 30 minutes in chart review, literature review, interpreting labs and/or imaging, composing documentation and in face-to-face time with the patient.      Genie Stone M.D.  Hematology/Oncology      Route Chart for Scheduling    Med Onc Chart Routing      Follow up with physician No follow up needed.   Follow up with [facility]    Infusion scheduling note    Injection scheduling note    Labs    Imaging   [facility]ammogram at [facility] [place] now   Pharmacy appointment    Other referrals

## 2024-06-24 ENCOUNTER — TELEPHONE (OUTPATIENT)
Dept: FAMILY MEDICINE | Facility: CLINIC | Age: 59
End: 2024-06-24
Payer: OTHER GOVERNMENT

## 2024-06-24 NOTE — TELEPHONE ENCOUNTER
Pt called back and so I rescheduled her for the same day but with the NP Lana. Pt said thanks and hung up.

## 2024-07-08 ENCOUNTER — TELEPHONE (OUTPATIENT)
Dept: FAMILY MEDICINE | Facility: CLINIC | Age: 59
End: 2024-07-08
Payer: OTHER GOVERNMENT

## 2024-07-08 DIAGNOSIS — Z00.00 WELLNESS EXAMINATION: Primary | ICD-10-CM

## 2024-07-08 NOTE — TELEPHONE ENCOUNTER
Called pt to reschedule appointment due to [place] will be out tomorrow for a . Left pt vm to call us back to get rescheduled. Please reschedule pt appointment.

## 2024-07-08 NOTE — TELEPHONE ENCOUNTER
----- Message from Brianda Orona sent at 7/8/2024 12:05 PM CDT -----  Type:  Patient Returning Call    Who Called:  pt   Who Left Message for Patient:  Nicole   Does the patient know what this is regarding?:  yes  Best Call Back Number:  [phone]  Additional Information:  pt stated she needs any and all annual labs/ testing to be ordered so pt can complete before schd appt for 07/26 at 1:30 please ensure to call pt back with status update to advise and prince asap thanks!

## 2024-07-09 ENCOUNTER — TELEPHONE (OUTPATIENT)
Dept: FAMILY MEDICINE | Facility: CLINIC | Age: 59
End: 2024-07-09
Payer: OTHER GOVERNMENT

## 2024-07-09 NOTE — TELEPHONE ENCOUNTER
Called pt got her scheduled for her annual fasting labs on 07/23/24 and r/s pt to [facility] schedule for the same day as her appointment on 07/26/24 as pt request.

## 2024-07-23 ENCOUNTER — LAB VISIT (OUTPATIENT)
Dept: LAB | Facility: HOSPITAL | Age: 59
End: 2024-07-23
Attending: INTERNAL MEDICINE
Payer: OTHER GOVERNMENT

## 2024-07-23 DIAGNOSIS — Z00.00 WELLNESS EXAMINATION: ICD-10-CM

## 2024-07-23 LAB
ALBUMIN SERPL BCP-MCNC: 3.7 G/DL (ref 3.5–5.2)
ALP SERPL-CCNC: 57 U/L (ref 55–135)
ALT SERPL W/O P-5'-P-CCNC: 17 U/L (ref 10–44)
ANION GAP SERPL CALC-SCNC: 6 MMOL/L (ref 8–16)
AST SERPL-CCNC: 18 U/L (ref 10–40)
BILIRUB SERPL-MCNC: 0.3 MG/DL (ref 0.1–1)
BUN SERPL-MCNC: 12 MG/DL (ref 6–20)
CALCIUM SERPL-MCNC: 9.3 MG/DL (ref 8.7–10.5)
CHLORIDE SERPL-SCNC: 103 MMOL/L (ref 95–110)
CHOLEST SERPL-MCNC: 215 MG/DL (ref 120–199)
CHOLEST/HDLC SERPL: 3.1 {RATIO} (ref 2–5)
CO2 SERPL-SCNC: 30 MMOL/L (ref 23–29)
CREAT SERPL-MCNC: 0.7 MG/DL (ref 0.5–1.4)
ERYTHROCYTE [DISTWIDTH] IN BLOOD BY AUTOMATED COUNT: 14.2 % (ref 11.5–14.5)
EST. GFR  (NO RACE VARIABLE): >60 ML/MIN/1.73 M^2
GLUCOSE SERPL-MCNC: 77 MG/DL (ref 70–110)
HCT VFR BLD AUTO: 41.4 % (ref 37–48.5)
HDLC SERPL-MCNC: 69 MG/DL (ref 40–75)
HDLC SERPL: 32.1 % (ref 20–50)
HGB BLD-MCNC: 13.5 G/DL (ref 12–16)
LDLC SERPL CALC-MCNC: 138.6 MG/DL (ref 63–159)
MCH RBC QN AUTO: 29.1 PG (ref 27–31)
MCHC RBC AUTO-ENTMCNC: 32.6 G/DL (ref 32–36)
MCV RBC AUTO: 89 FL (ref 82–98)
NONHDLC SERPL-MCNC: 146 MG/DL
PLATELET # BLD AUTO: 346 K/UL (ref 150–450)
PMV BLD AUTO: 10 FL (ref 9.2–12.9)
POTASSIUM SERPL-SCNC: 5.1 MMOL/L (ref 3.5–5.1)
PROT SERPL-MCNC: 6.9 G/DL (ref 6–8.4)
RBC # BLD AUTO: 4.64 M/UL (ref 4–5.4)
SODIUM SERPL-SCNC: 139 MMOL/L (ref 136–145)
TRIGL SERPL-MCNC: 37 MG/DL (ref 30–150)
WBC # BLD AUTO: 6.51 K/UL (ref 3.9–12.7)

## 2024-07-23 PROCEDURE — 80053 COMPREHEN METABOLIC PANEL: CPT | Performed by: INTERNAL MEDICINE

## 2024-07-23 PROCEDURE — 85027 COMPLETE CBC AUTOMATED: CPT | Performed by: INTERNAL MEDICINE

## 2024-07-23 PROCEDURE — 80061 LIPID PANEL: CPT | Performed by: INTERNAL MEDICINE

## 2024-07-23 PROCEDURE — 36415 COLL VENOUS BLD VENIPUNCTURE: CPT | Mod: PN | Performed by: INTERNAL MEDICINE

## 2024-07-29 ENCOUNTER — HOSPITAL ENCOUNTER (OUTPATIENT)
Dept: RADIOLOGY | Facility: HOSPITAL | Age: 59
Discharge: HOME | End: 2024-07-29
Attending: NURSE PRACTITIONER
Payer: OTHER GOVERNMENT

## 2024-07-29 ENCOUNTER — TELEPHONE (OUTPATIENT)
Dept: FAMILY MEDICINE | Facility: CLINIC | Age: 59
End: 2024-07-29
Payer: OTHER GOVERNMENT

## 2024-07-29 ENCOUNTER — OFFICE VISIT (OUTPATIENT)
Dept: FAMILY MEDICINE | Facility: CLINIC | Age: 59
End: 2024-07-29
Payer: OTHER GOVERNMENT

## 2024-07-29 VITALS
HEART RATE: 64 BPM | HEIGHT: 68 IN | DIASTOLIC BLOOD PRESSURE: 80 MMHG | OXYGEN SATURATION: 100 % | SYSTOLIC BLOOD PRESSURE: 138 MMHG | BODY MASS INDEX: 24.22 KG/M2 | WEIGHT: 159.81 LBS

## 2024-07-29 DIAGNOSIS — M81.0 OSTEOPOROSIS, UNSPECIFIED OSTEOPOROSIS TYPE, UNSPECIFIED PATHOLOGICAL FRACTURE PRESENCE: ICD-10-CM

## 2024-07-29 DIAGNOSIS — M25.551 RIGHT HIP PAIN: ICD-10-CM

## 2024-07-29 DIAGNOSIS — Z00.00 WELLNESS EXAMINATION: Primary | ICD-10-CM

## 2024-07-29 PROCEDURE — 99999 PR PBB SHADOW E&M-EST. PATIENT-LVL V: CPT | Mod: PBBFAC,,, | Performed by: NURSE PRACTITIONER

## 2024-07-29 PROCEDURE — 73502 X-RAY EXAM HIP UNI 2-3 VIEWS: CPT | Mod: 26,RT,, | Performed by: RADIOLOGY

## 2024-07-29 PROCEDURE — 99215 OFFICE O/P EST HI 40 MIN: CPT | Mod: PBBFAC,PN,25 | Performed by: NURSE PRACTITIONER

## 2024-07-29 PROCEDURE — 99396 PREV VISIT EST AGE 40-64: CPT | Mod: S$PBB,,, | Performed by: NURSE PRACTITIONER

## 2024-07-29 PROCEDURE — 73502 X-RAY EXAM HIP UNI 2-3 VIEWS: CPT | Mod: TC,PN,RT

## 2024-07-29 NOTE — PROGRESS NOTES
THIS DOCUMENT WAS MADE IN PART WITH VOICE RECOGNITION SOFTWARE.  OCCASIONALLY THIS SOFTWARE WILL MISINTERPRET WORDS OR PHRASES.     Assessment and Plan:    Wellness examination  Comments:  anticipatory guidance reviewed    Osteoporosis, unspecified osteoporosis type, unspecified pathological fracture presence  -     DXA Bone Density Axial Skeleton 1 or more sites; Future; Expected date: 07/29/2024    Right hip pain  Comments:  advised on symptomatic treatment   stretches,  warm compresses.   ibuprofen or Tylenol as needed for pain  Orders:  -     X-Ray Hip 2 or 3 views Right with Pelvis when performed; Future; Expected date: 07/29/2024         Visit summary:   Patient was seen for annual wellness visit today     Recent labs reviewed in detail with patient during today's visit    Screening recommendations appropriate to age and health status were reviewed.     Continue to work on regular exercise, maintain healthy weight, balanced diet. Avoid unhealthy habits: smoking, excessive alcohol intake.     Follow up in about 1 year (around 7/29/2025) for Annual with PCP.   ______________________________________________________________________    Subjective:    Chief Complaint:  Annual exam    HPI:  Sonido is a 58 y.o. year old patient here for annual exam.      Patient had labs completed on July 23rd.    MMG:   recent mammogram-  July 5th. 11/2020- hx of invasive ductal breast cancer.  Did not tolerate tamoxifen took only a few months.  Has not followed up with Oncology here  Colonoscopy:  states having completed earlier this year-  Garfield argueta - we will request records  Immunizations: Flu: Tdap: Pneumovax: Prevnar 13: Shingles: Covid:   Smoker: former    Due  Pap-  She will schedule with gyn- sees  Dr. Martin    Thyroid nodule:  Has had FNA seen endo Dr Damien naranjo for recheck in  November     She reports having some right sided hip pain- worse when she sits for long periods of time or lays on right side for too long. She  doesn't take anything for pain. Became more noticeable over past  couple of months. Denies trauma.           Past Medical History:  Past Medical History:   Diagnosis Date    Allergic rhinitis due to pollen     Breast cancer     left lumpectomy invasive ductal (dx [place]) tamo    Closed nondisplaced fracture of fifth metatarsal bone of left foot with routine healing      hx of right same area fracture  (boot for both)    Colon polyp     Encounter for blood transfusion     after miscarriage     ER+ (estrogen receptor positive status) 2022    Erosive osteoarthritis 2020    Fibromyalgia     Headache     Hernia of abdominal wall     Liver cyst     Malignant neoplasm of left breast in female, estrogen receptor positive 2022    S/P chemotherapy, time since greater than 12 weeks 2022    S/P lumpectomy, left breast () 2022    S/P radiotherapy 2022    Thyroid nodule     left nodule  stable    Umbilical hernia        Past Surgical History:  Past Surgical History:   Procedure Laterality Date    BREAST LUMPECTOMY Left     with radiation and chemotherapy     SECTION      1    CHOLECYSTECTOMY      COLONOSCOPY  2015    Dr. Davenport, sent for scanning: one colon polyps removed, tortuous colon, large internal hemorrhoids; biopsy: tubular adenoma, repeat in 5 years for surveillance    ESOPHAGOGASTRODUODENOSCOPY N/A 2019    Procedure: EGD (ESOPHAGOGASTRODUODENOSCOPY);  Surgeon: Michael Hebert Jr., MD;  Location: [facility];  Service: Endoscopy;  Laterality: N/A;    HERNIA REPAIR      REPAIR OF COLLATERAL LIGAMENT OF THUMB Bilateral      Dr Farr    ROBOT-ASSISTED LAPAROSCOPIC REPAIR OF INCISIONAL HERNIA N/A 10/07/2019    Procedure: ROBOTIC REPAIR, HERNIA, INCISIONAL;  Surgeon: Alexis Gonzalez MD;  Location: [facility];  Service: General;  Laterality: N/A;    VEIN SURGERY Right     Varicose vein sclerotherapy       Family History:  Family History    Problem Relation Name Age of Onset    Breast cancer Mother Shakira 82    Arthritis Mother Shakira     Cancer Mother Shakira     Heart disease Father Arnold     Chronic infections Sister      AVM Sister      No Known Problems Brother      Breast cancer Maternal Aunt  78        mother's twin    Liver cancer Paternal Aunt      Cirrhosis Maternal Grandmother      Rhinitis Daughter      Urticaria Neg Hx      Immunodeficiency Neg Hx      Eczema Neg Hx      Asthma Neg Hx      Angioedema Neg Hx      Colon cancer Neg Hx      Crohn's disease Neg Hx      Ulcerative colitis Neg Hx      Esophageal cancer Neg Hx      Cataracts Neg Hx      Glaucoma Neg Hx      Macular degeneration Neg Hx         Social History:  Social History     Socioeconomic History    Marital status:    Tobacco Use    Smoking status: Former     Current packs/day: 0.00     Average packs/day: 1 pack/day for 3.0 years (3.0 ttl pk-yrs)     Types: Cigarettes     Start date: 1980     Quit date: 1983     Years since quittin.5     Passive exposure: Past    Smokeless tobacco: Never    Tobacco comments:     Very brief part of late teens   Substance and Sexual Activity    Alcohol use: Yes     Alcohol/week: 1.0 standard drink of alcohol     Types: 1 Glasses of wine per week     Comment: 1-2 times a year    Drug use: Never    Sexual activity: Yes     Partners: Male     Birth control/protection: Post-menopausal, None     Social Determinants of Health     Financial Resource Strain: Low Risk  (2024)    Overall Financial Resource Strain (CARDIA)     Difficulty of Paying Living Expenses: Not hard at all   Food Insecurity: No Food Insecurity (2024)    Hunger Vital Sign     Worried About Running Out of Food in the Last Year: Never true     Ran Out of Food in the Last Year: Never true   Transportation Needs: No Transportation Needs (2024)    PRAPARE - Transportation     Lack of Transportation (Medical): No     Lack of Transportation (Non-Medical):  No   Physical Activity: Sufficiently Active (4/16/2024)    Exercise Vital Sign     Days of Exercise per Week: 6 days     Minutes of Exercise per Session: 50 min   Stress: No Stress Concern Present (4/16/2024)    [employer] - Occupational Stress Questionnaire     Feeling of Stress : Not at all   Housing Stability: Unknown (4/16/2024)    Housing Stability Vital Sign     Unable to Pay for Housing in the Last Year: No       Medications:  Current Outpatient Medications on File Prior to Visit   Medication Sig Dispense Refill    ascorbic acid (VITAMIN C) 500 MG tablet Take 500 mg by mouth once daily.      b complex vitamins capsule Take 1 capsule by mouth once daily.      calcium carbonate (CALCIUM 500 ORAL) Take by mouth.      ergocalciferol, vitamin D2, (VITAMIN D ORAL) Take by mouth.      multivitamin (THERAGRAN) per tablet Take 1 tablet by mouth once daily.      turmeric 400 mg Cap Take by mouth.      EPINEPHrine (EPIPEN) 0.3 mg/0.3 mL AtIn Inject 0.3 mLs (0.3 mg total) into the muscle once. for 1 dose (Patient not taking: Reported on 7/29/2024) 2 each 0    naproxen sodium (ANAPROX) 220 MG tablet Take 220 mg by mouth every 12 (twelve) hours. (Patient not taking: Reported on 7/29/2024)       No current facility-administered medications on file prior to visit.       Allergies:  Gabapentin, Tamoxifen analogues, and Adhesive    Immunizations:    There is no immunization history on file for this patient.    Review of Systems:  Review of Systems   Constitutional:  Negative for fatigue and unexpected weight change.   HENT:  Negative for congestion, postnasal drip and rhinorrhea.    Respiratory:  Negative for cough and shortness of breath.    Gastrointestinal:  Negative for constipation, diarrhea, nausea and vomiting.   Genitourinary:  Negative for difficulty urinating and dysuria.   Musculoskeletal:  Positive for arthralgias (right hip pain). Negative for back pain, gait problem and joint  "swelling.   Neurological:  Negative for dizziness and headaches.       Objective:    Vitals:  Vitals:    07/29/24 1435   BP: 138/80   Pulse: 64   SpO2: 100%   Weight: 72.5 kg (159 lb 13.3 oz)   Height: 5' 8" (1.727 m)   PainSc: 0-No pain       Physical Exam  Vitals and nursing note reviewed.   Constitutional:       General: She is not in acute distress.  HENT:      Head: Normocephalic and atraumatic.   Eyes:      General: No scleral icterus.     Conjunctiva/sclera: Conjunctivae normal.   Cardiovascular:      Rate and Rhythm: Normal rate and regular rhythm.   Pulmonary:      Effort: Pulmonary effort is normal. No respiratory distress.      Breath sounds: Normal breath sounds.   Abdominal:      General: Bowel sounds are normal. There is no distension.      Palpations: Abdomen is soft.      Tenderness: There is no abdominal tenderness.   Musculoskeletal:      Right hip: No tenderness or bony tenderness. Normal range of motion.      Right lower leg: No edema.      Left lower leg: No edema.   Skin:     General: Skin is warm and dry.   Neurological:      Mental Status: She is alert and oriented to person, place, and time.   Psychiatric:         Mood and Affect: Mood normal.         Behavior: Behavior normal.         Thought Content: Thought content normal.         Data:  CMP  Sodium   Date Value Ref Range Status   07/23/2024 139 136 - 145 mmol/L Final     Potassium   Date Value Ref Range Status   07/23/2024 5.1 3.5 - 5.1 mmol/L Final     Chloride   Date Value Ref Range Status   07/23/2024 103 95 - 110 mmol/L Final     CO2   Date Value Ref Range Status   07/23/2024 30 (H) 23 - 29 mmol/L Final     Glucose   Date Value Ref Range Status   07/23/2024 77 70 - 110 mg/dL Final     BUN   Date Value Ref Range Status   07/23/2024 12 6 - 20 mg/dL Final     Creatinine   Date Value Ref Range Status   07/23/2024 0.7 0.5 - 1.4 mg/dL Final     Calcium   Date Value Ref Range Status   07/23/2024 9.3 8.7 - 10.5 mg/dL Final     Total Protein "   Date Value Ref Range Status   07/23/2024 6.9 6.0 - 8.4 g/dL Final     Albumin   Date Value Ref Range Status   07/23/2024 3.7 3.5 - 5.2 g/dL Final     Total Bilirubin   Date Value Ref Range Status   07/23/2024 0.3 0.1 - 1.0 mg/dL Final     Comment:     For infants and newborns, interpretation of results should be based  on gestational age, weight and in agreement with clinical  observations.    Premature Infant recommended reference ranges:  Up to 24 hours.............<8.0 mg/dL  Up to 48 hours............<12.0 mg/dL  3-5 days..................<15.0 mg/dL  6-29 days.................<15.0 mg/dL       Alkaline Phosphatase   Date Value Ref Range Status   07/23/2024 57 55 - 135 U/L Final     AST   Date Value Ref Range Status   07/23/2024 18 10 - 40 U/L Final     ALT   Date Value Ref Range Status   07/23/2024 17 10 - 44 U/L Final     Anion Gap   Date Value Ref Range Status   07/23/2024 6 (L) 8 - 16 mmol/L Final     eGFR   Date Value Ref Range Status   07/23/2024 >60.0 >60 mL/min/1.73 m^2 Final           Lab Results   Component Value Date    WBC 6.51 07/23/2024    HGB 13.5 07/23/2024    HCT 41.4 07/23/2024    MCV 89 07/23/2024     07/23/2024      The 10-year ASCVD risk score (Vonnie DK, et al., 2019) is: 3.5%    Values used to calculate the score:      Age: 58 years      Sex: Female      Is Non- : No      Diabetic: No      Tobacco smoker: No      Systolic Blood Pressure: 138 mmHg      Is BP treated: No      HDL Cholesterol: 69 mg/dL      Total Cholesterol: 215 mg/dL     Medical history reviewed, Medications reconciled.          AFTAB Domínguez-C  Family Medicine

## 2024-07-29 NOTE — TELEPHONE ENCOUNTER
----- Message from Evans Baker MD sent at 7/26/2024 12:24 PM CDT -----  This pt had labs done.  Hasn't seen me since 2022.  Has cancelled multi visit.      Make her an appt for wellness and labs review

## 2024-07-30 ENCOUNTER — TELEPHONE (OUTPATIENT)
Dept: FAMILY MEDICINE | Facility: CLINIC | Age: 59
End: 2024-07-30
Payer: OTHER GOVERNMENT

## 2024-07-30 NOTE — PROGRESS NOTES
No evidence of fracture or dislocation or other acute bone/ joint abnormality.  Mild age-related  hip joint space narrowing noted.  Recommend taking NSAIDs ( ibuprofen or Aleve) as needed Recommend physical therapy if patient continues to have pain.

## 2024-07-30 NOTE — TELEPHONE ENCOUNTER
----- Message from AFTAB Domínguez sent at 7/30/2024  9:35 AM CDT -----   No evidence of fracture or dislocation or other acute bone/ joint abnormality.  Mild age-related  hip joint space narrowing noted.  Recommend taking NSAIDs ( ibuprofen or Aleve) as needed Recommend physical therapy if patient continues to have pain.

## 2024-09-20 ENCOUNTER — HOSPITAL ENCOUNTER (OUTPATIENT)
Dept: RADIOLOGY | Facility: HOSPITAL | Age: 59
Discharge: HOME | End: 2024-09-20
Attending: NURSE PRACTITIONER
Payer: OTHER GOVERNMENT

## 2024-09-20 DIAGNOSIS — M81.0 OSTEOPOROSIS, UNSPECIFIED OSTEOPOROSIS TYPE, UNSPECIFIED PATHOLOGICAL FRACTURE PRESENCE: ICD-10-CM

## 2024-09-20 PROCEDURE — 77092 TBS I&R FX RSK QHP: CPT | Mod: ,,, | Performed by: RADIOLOGY

## 2024-09-20 PROCEDURE — 77080 DXA BONE DENSITY AXIAL: CPT | Mod: TC,PO

## 2024-09-20 PROCEDURE — 77080 DXA BONE DENSITY AXIAL: CPT | Mod: 26,,, | Performed by: RADIOLOGY

## 2024-09-20 PROCEDURE — 77091 TBS TECHL CALCULATION ONLY: CPT | Mod: PO

## 2024-10-20 NOTE — TELEPHONE ENCOUNTER
Understood and I apologize for that exchange. I do think you need an evaluation for both the inguinal lymph node and the hernia and I'll refer you to another surgeon.   
20-Oct-2024 23:48

## 2024-11-14 ENCOUNTER — OFFICE VISIT (OUTPATIENT)
Dept: FAMILY MEDICINE | Facility: CLINIC | Age: 59
End: 2024-11-14
Payer: OTHER GOVERNMENT

## 2024-11-14 ENCOUNTER — PATIENT OUTREACH (OUTPATIENT)
Dept: ADMINISTRATIVE | Facility: HOSPITAL | Age: 59
End: 2024-11-14
Payer: OTHER GOVERNMENT

## 2024-11-14 ENCOUNTER — HOSPITAL ENCOUNTER (OUTPATIENT)
Dept: RADIOLOGY | Facility: HOSPITAL | Age: 59
Discharge: HOME | End: 2024-11-14
Attending: INTERNAL MEDICINE
Payer: OTHER GOVERNMENT

## 2024-11-14 VITALS
WEIGHT: 160.25 LBS | SYSTOLIC BLOOD PRESSURE: 138 MMHG | RESPIRATION RATE: 18 BRPM | HEIGHT: 68 IN | HEART RATE: 84 BPM | BODY MASS INDEX: 24.29 KG/M2 | DIASTOLIC BLOOD PRESSURE: 88 MMHG | OXYGEN SATURATION: 98 %

## 2024-11-14 DIAGNOSIS — K76.89 LIVER CYST: ICD-10-CM

## 2024-11-14 DIAGNOSIS — R14.0 BLOATING SYMPTOM: ICD-10-CM

## 2024-11-14 DIAGNOSIS — M81.0 AGE-RELATED OSTEOPOROSIS WITHOUT CURRENT PATHOLOGICAL FRACTURE: ICD-10-CM

## 2024-11-14 DIAGNOSIS — R07.81 RIB PAIN ON RIGHT SIDE: ICD-10-CM

## 2024-11-14 DIAGNOSIS — R10.11 CHRONIC RUQ PAIN: ICD-10-CM

## 2024-11-14 DIAGNOSIS — Z17.0 MALIGNANT NEOPLASM OF LEFT BREAST IN FEMALE, ESTROGEN RECEPTOR POSITIVE, UNSPECIFIED SITE OF BREAST: ICD-10-CM

## 2024-11-14 DIAGNOSIS — Z00.00 WELLNESS EXAMINATION: Primary | ICD-10-CM

## 2024-11-14 DIAGNOSIS — C50.912 MALIGNANT NEOPLASM OF LEFT BREAST IN FEMALE, ESTROGEN RECEPTOR POSITIVE, UNSPECIFIED SITE OF BREAST: ICD-10-CM

## 2024-11-14 DIAGNOSIS — G89.29 CHRONIC RUQ PAIN: ICD-10-CM

## 2024-11-14 PROCEDURE — 71100 X-RAY EXAM RIBS UNI 2 VIEWS: CPT | Mod: TC,PN,RT

## 2024-11-14 PROCEDURE — 99215 OFFICE O/P EST HI 40 MIN: CPT | Mod: PBBFAC,25,PN | Performed by: INTERNAL MEDICINE

## 2024-11-14 PROCEDURE — 71100 X-RAY EXAM RIBS UNI 2 VIEWS: CPT | Mod: 26,RT,, | Performed by: RADIOLOGY

## 2024-11-14 PROCEDURE — 99999 PR PBB SHADOW E&M-EST. PATIENT-LVL V: CPT | Mod: PBBFAC,,, | Performed by: INTERNAL MEDICINE

## 2024-11-14 RX ORDER — METHOCARBAMOL 500 MG/1
500 TABLET, FILM COATED ORAL NIGHTLY PRN
Qty: 30 TABLET | Refills: 0 | Status: SHIPPED | OUTPATIENT
Start: 2024-11-14 | End: 2024-11-24

## 2024-11-14 NOTE — PROGRESS NOTES
Population Health Chart Review & Patient Outreach Details      Additional [facility] Health Notes:               Updates Requested / Reviewed:      Updated Care Coordination Note and          Health Maintenance Topics Overdue:      VB Score: 1     Cervical Cancer Screening    Influenza Vaccine  Pneumonia Vaccine  Tetanus Vaccine  Shingles/Zoster Vaccine                  Health Maintenance Topic(s) Outreach Outcomes & Actions Taken:    Colorectal Cancer Screening - Outreach Outcomes & Actions Taken  : External Records Requested & Care Team Updated if Applicable

## 2024-11-14 NOTE — LETTER
AUTHORIZATION FOR RELEASE OF   CONFIDENTIAL INFORMATION    Dear EVERTON Davenport MD,    We are seeing Sonido Loera, date of birth 1965, in the clinic at [facility]. Evans Baker MD is the patient's PCP. Sonido Loera has an outstanding lab/procedure at the time we reviewed her chart. In order to help keep her health information updated, she has authorized us to request the following medical record(s):        (  )  MAMMOGRAM                                      (X)  COLONOSCOPY/PATH/REPEAT DT      (  )  PAP SMEAR                                          (  )  OUTSIDE LAB RESULTS     (  )  DEXA SCAN                                          (  )  EYE EXAM            (  )  FOOT EXAM                                          (  )  ENTIRE RECORD     (  )  OUTSIDE IMMUNIZATIONS                 (  )  _______________         Please fax records to [facility], Simon-Davis, Brandon D., MD, [phone] or email to [email or web address].    If you have any questions, please contact Ananda Morales LPN Care Coordinator  at [phone].            Patient Name: Sonido Loera  : 1965  Patient Phone #: [phone]                       Sonido Loera  MRN: [id number]  : 1965  Age: 59 y.o.  Sex: female         Patient/Legal Guardian Signature  This signature was collected at 2024    Sonido Loera     Self  _______________________________   Printed Name/Relationship to Patient      Consent for Examination and Treatment: I hereby authorize the providers and employees of [facility] ([facility]) to provide medical treatment/services which includes, but is not limited to, performing and administering tests and diagnostic procedures that are deemed necessary, including, but not limited to, imaging examinations, blood tests and other laboratory procedures as may be required by the hospital, clinic, or may be ordered by my physician(s) or persons working under the  general and/or special instructions of my physician(s).      I understand and agree that this consent covers all authorized persons, including but not limited to physicians, residents, nurse practitioners, physicians' assistants, specialists, consultants, student nurses, and independently contracted physicians, who are called upon by the physician in charge, to carry out the diagnostic procedures and medical or surgical treatment.     I hereby authorize [facility] to retain or dispose of any specimens or tissue, should there be such remaining from any test or procedure.     I hereby authorize and give consent for [facility] providers and employees to take photographs, images or videotapes of such diagnostic, surgical or treatment procedures of Patient as may be required by [facility] or as may be ordered by a physician. I further acknowledge and agree that [facility] may use cameras or other devices for patient monitoring.     I am aware that the practice of medicine is not an exact science, and I acknowledge that no guarantees have been made to me as to the outcome of any tests, procedures or treatment.     Authorization for Release of Information: I understand that my insurance company and/or their agents may need information necessary to make determinations about payment/reimbursement. I hereby provide authorization to release to all insurance companies, their successors, assignees, other parties with whom they may have contracted, or others acting on their behalf, that are involved with payment for any hospital and/or clinic charges incurred by the patient, any information that they request and deem necessary for payment/reimbursement, and/or quality review.  I further authorize the release of my health information to physicians or other health care practitioners on staff who are involved in my health care now and in the future, and to other health care providers, entities, or institutions for the purpose of my continued  care and treatment, including referrals.     REGISTRATION AUTHORIZATION  Form No. 46575 (Rev. 3/25/2024)    Page 1 of 3                       Medicare Patient's Certification and Authorization to Release Information and Payment Request:  I certify that the information given by me in applying for payment under Title XVIII of the Social Security Act is correct. I authorize any trimble of medical or other information about me to release to the Social SecurityAdministration, or its intermediaries or carriers, any information needed for this or a related Medicare claim. I request that payment of authorized benefits be made on my behalf.     Assignment of Insurance Benefits:   I hereby authorize any and all insurance companies, health plans, defined   benefit plans, health insurers or any entity that is or may be responsible for payment of my medical expenses to pay all hospital and medical benefits now due, and to become due and payable to me under any hospital benefits, sick benefits, injury benefits or any other benefit for services rendered to me, including Major Medical Benefits, direct to [facility] and all independently contracted physicians. I assign any and all rights that I may have against any and all insurance companies, health plans, defined benefit plans, health insurers or any entity that is or may be responsible for payment of my medical expenses, including, but not limited to any right to appeal a denial of a claim, any right to bring any action, lawsuit, administrative proceeding, or other cause of action on my behalf. I specifically assign my right to pursue litigation against any and all insurance companies, health plans, defined benefit plans, health insurers or any entity that is or may be responsible for payment of my medical expenses based upon a refusal to pay charges.            E. Valuables: It is understood and agreed that [facility] is not liable for the damage to or loss of any money, jewelry,    documents, dentures, eye glasses, hearing aids, prosthetics, or other property of value.     F. Computer Equipment: I understand and agree that should I choose to use computer equipment owned by [facility] or if I choose to access the Internet via [facility]s network, I do so at my own risk. [facility] is not responsible for any damage to my computer equipment or to any damages of any type that might arise from my loss of equipment or data.     G. Acceptance of Financial Responsibility:  I agree that in consideration of the services and   supplies that have been   or will be furnished to the patient, I am hereby obligated to pay all charges made for or on the account of the patient according to the standard rates (in effect at the time the services and supplies are delivered) established by [facility], including its Patient Financial Assistance Policy to the extent it is applicable. I understand that I am responsible for all charges, or portions thereof, not covered by insurance or other sources. Patient refunds will be distributed only after balances at all [facility] facilities are paid.     H. Communication Authorization:  I hereby authorize [facility] and its representatives, along with any billing service   or  who may work on their behalf, to contact me on   my cell phone and/or home phone using pre- recorded messages, artificial voice messages, automatic telephone dialing devices or other computer assisted technology, or by electronic      mail, text messaging, or by any other form of electronic communication. This includes, but is not limited to, appointment reminders, yearly physical exam reminders, preventive care reminders, patient campaigns, welcome calls, and calls about account balances on my account or any account on which I am listed as a guarantor. I understand I have the right to opt out of these communications at any time.      Relationship  Between  Facility and  Provider:      I understand that  some, but not all, providers furnishing services to the patient are not employees or agents of [facility]. The patient is under the care and supervision of his/her attending physician, and it is the responsibility of the facility and its nursing staff to carry out the instructions of such physicians. It is the responsibility of the patient's physician/designee to obtain the patient's informed consent, when required, for medical or surgical treatment, special diagnostic or therapeutic procedures, or hospital services rendered for the patient under the special instructions of the physician/designee.           REGISTRATION AUTHORIZATION  Form No. 68552 (Rev. 3/25/2024)    Page 2 of 3                       Immunizations: [facility] shares immunization information with state sponsored health departments to help you and your doctor keep track of your immunization records. By signing, you consent to have this information shared with the health department in your state:                                [place] - LINKS ([place] Immunization Network for Kids Statewide)                                [place] - MIIX ([place] Immunization Information eXchange)                                [place] - ImmPRINT (Immunization Patient Registry with Integrated Technology)     TERM: This authorization is valid for this and subsequent care/treatment I receive at [facility] and will remain valid unless/until revoked in writing by me.     [facility]: As used in this document, [facility] means all [facility] owned and managed facilities, including, but not limited to, all health centers, surgery centers, clinics, urgent care centers, and hospitals.         [facility] complies with applicable Federal civil rights laws and does not discriminate on the basis of race, color, national origin, age, disability, or sex.  ATENCIÓN: si habla nik, tiene a kingsley disposición servicios gratuitos de asistencia lingüística. Llame  al [phone].  [place] Ý: N?u b?n nói Ti?ng Vi?t, có các d?ch v? h? tr? ngôn ng? mi?n phí enriqueh cho b?n. G?i s? [phone].        REGISTRATION AUTHORIZATION  Form No. 01702 (Rev. 3/25/2024)   Page 3 of 3

## 2024-11-14 NOTE — PROGRESS NOTES
Subjective:       Patient ID: Sonido Loera is a 59 y.o. female.    Chief Complaint: Annual Exam (Patient is here today for ongoing pain and pressure under right rib. This has been constant for the past 2-3 months. Sometimes it's like a pulling or throbbing sensation. Not taking any medication to help. )   HPI       Here for annual    Patient presents with persistent right upper quadrant discomfort and back pain of 2-3 months duration.    HPI:  Patient reports right upper quadrant discomfort for 2-3 months, described as constant and characterized more as a pulling sensation rather than pain. The discomfort radiates to the back and fluctuates in pressure but is present daily, feeling as if it extends through the body. Patient notes this sensation is different from previous experiences with her gallbladder, as it has never been constantly present like this before. 3 days prior to the appointment, the patient had throbbing in the area, though not continuously throughout the day.  Has not taken anything over-the-counter for it    Patient is unsure if eating affects the discomfort, noting that occasionally it might worsen after certain foods, but cannot identify a clear pattern.   The discomfort does not affect appetite or eating habits.    Patient denies recent falls or injuries that might explain the discomfort. Stretching does not particularly affect the pain,     Bowel movements occur mostly daily, sometimes 2-3 times daily. Stools are generally normal Patient reports some blood during bowel movements, attributed to a hemorrhoid developed during her third pregnancy.  Colonoscopy was read done need to get report    Patient denies pain when breathing, constipation, nausea, reflux, diarrhea, increased urinary frequency, or blood in the urine.    MEDICAL HISTORY:  Patient has a history of osteoporosis, liver cyst, and hemangiomas.      TEST RESULTS:  Last year, the patient's Vitamin D levels were low, falling within  the osteoporosis range (less than -2.5). A thyroid test conducted in November last year showed a TSH of 1.9, which has remained unchanged since 2019. Patient recently underwent a bone density scan, which revealed osteoporosis in the hip with a T-score less than -2.5.    IMAGING:  A PET scan in  showed an absent gallbladder and a small, stable liver cyst. The pancreas and adrenals appeared normal.   A CT from  revealed 3 small liver cysts: a 1.9 cm cyst in the right hepatic lobe and a small cyst in the left lobe. No fatty liver was observed.   An MRI of the spine showed T1 changes (possibly arthritis), a normal spinal cord, and small hemangiomas.          Last visit with me 22     Gyn: ANATOLIY Martin   MM'' L invasive ductal Breast CA ER+ s/p lumpectomy chemo, XRT.   SE: tamoxifen took only a few months.  DC previous Dr [facility]edo oncology.   Osteoporosis:  24' L -1.9 & H -2.6.  Discussed bisphosphonate patient defer.  Side effect profile  Colonoscopy:   3/2015 r/c 5  Dr Davenport 24 recently done  Immunizations: Flu: Tdap: Shingles: recommend   Smoker: former quit 83 ' / PET negative      Wellness  Lab review        Migraines:  [facility]     Thyroid nodule: stable US 23'   hx benign FNA   Mgmt endo Dr Quezada    138/94       ____________________________________________________________________________________________________  Assessment & Plan:  1. Wellness examination    2. Rib pain on right side  - X-Ray Ribs 2 View Right; Future  - methocarbamoL (ROBAXIN) 500 MG Tab; Take 1 tablet (500 mg total) by mouth nightly as needed (muscle relaxer).  Dispense: 30 tablet; Refill: 0    3. Chronic RUQ pain  - US Abdomen Complete; Future  - methocarbamoL (ROBAXIN) 500 MG Tab; Take 1 tablet (500 mg total) by mouth nightly as needed (muscle relaxer).  Dispense: 30 tablet; Refill: 0    4. Bloating symptom  - US Abdomen Complete; Future    5. Liver cyst    6. Age-related osteoporosis without current pathological  fracture    7. Malignant neoplasm of left breast in female, estrogen receptor positive, unspecified site of breast     Wellness examination    Rib pain on right side  -     X-Ray Ribs 2 View Right; Future; Expected date: 11/14/2024  -     methocarbamoL (ROBAXIN) 500 MG Tab; Take 1 tablet (500 mg total) by mouth nightly as needed (muscle relaxer).  Dispense: 30 tablet; Refill: 0    Chronic RUQ pain  -     Cancel: US Abdomen Complete; Future; Expected date: 11/14/2024  -     US Abdomen Complete; Future; Expected date: 11/14/2024  -     methocarbamoL (ROBAXIN) 500 MG Tab; Take 1 tablet (500 mg total) by mouth nightly as needed (muscle relaxer).  Dispense: 30 tablet; Refill: 0    Bloating symptom  -     US Abdomen Complete; Future; Expected date: 11/14/2024    Liver cyst  Comments:  Recheck ultrasound    Age-related osteoporosis without current pathological fracture  Comments:  Discussed bisphosphonate meds patient will think about it    Malignant neoplasm of left breast in female, estrogen receptor positive, unspecified site of breast        Continue to work on maintain healthy weight, balanced diet. Avoid unhealthy habits: smoking/vaping, excessive alcohol intake.     Recommend diet exercise:  High protein, low fat, low carb diet - calories women under <1200 & men <1800, carbs <100 G, protein 50-60 G daily. Protein supplements to replace one meal.  Keep all beverages < 10 calories per serving. Keep snacks < 100 calories.   Recommend exercise 160 minutes per week, combo of cardio and weight/strength training.     Visit today included increased complexity associated with the care of the episodic problem addressed and managing the longitudinal care of the patient due to the serious and/or complex managed problem(s).  History of breast cancer osteoporosis      Disclaimer: This note was partly generated using dictation software which may occasionally result in transcription  "errors  ____________________________________________________________________________________________________  Review of Systems:  Review of Systems      Side pain    Objective:     Wt Readings from Last 3 Encounters:   11/14/24 72.7 kg (160 lb 4.4 oz)   07/29/24 72.5 kg (159 lb 13.3 oz)   07/05/24 76.7 kg (169 lb)     BP Readings from Last 3 Encounters:   11/14/24 138/88   07/29/24 138/80   04/17/24 126/80       Lab Results   Component Value Date    WBC 6.51 07/23/2024    HGB 13.5 07/23/2024    HCT 41.4 07/23/2024     07/23/2024     07/23/2024    K 5.1 07/23/2024     07/23/2024    ALT 17 07/23/2024    AST 18 07/23/2024    CO2 30 (H) 07/23/2024    CREATININE 0.7 07/23/2024    BUN 12 07/23/2024    GLU 77 07/23/2024      No results found for: "HGBA1C"   Lab Results   Component Value Date    TSH 1.484 09/07/2023    TSH 1.059 04/21/2021    TSH 0.916 09/12/2019     Lab Results   Component Value Date    FREET4 0.90 06/28/2017     Lab Results   Component Value Date    LDLCALC 138.6 07/23/2024    LDLCALC 123.8 04/21/2021    LDLCALC 141.8 07/19/2019     Lab Results   Component Value Date    TRIG 37 07/23/2024    TRIG 41 04/21/2021    TRIG 41 07/19/2019            Physical Exam  Constitutional:       Appearance: Normal appearance.   HENT:      Head: Normocephalic and atraumatic.   Eyes:      Extraocular Movements: Extraocular movements intact.      Pupils: Pupils are equal, round, and reactive to light.   Cardiovascular:      Rate and Rhythm: Normal rate and regular rhythm.   Pulmonary:      Effort: Pulmonary effort is normal.      Breath sounds: Normal breath sounds.   Abdominal:      General: Bowel sounds are normal.      Palpations: There is no mass.      Tenderness: There is abdominal tenderness. There is no right CVA tenderness, left CVA tenderness, guarding or rebound.      Hernia: No hernia is present.          Comments: Mild tenderness deep palpation no rebound   Musculoskeletal:      Right lower " leg: No edema.      Left lower leg: No edema.   Neurological:      Mental Status: She is alert and oriented to person, place, and time.   Psychiatric:         Mood and Affect: Mood normal.             Medication List with Changes/Refills   New Medications    METHOCARBAMOL (ROBAXIN) 500 MG TAB    Take 1 tablet (500 mg total) by mouth nightly as needed (muscle relaxer).   Current Medications    ASCORBIC ACID (VITAMIN C) 500 MG TABLET    Take 500 mg by mouth once daily.    B COMPLEX VITAMINS CAPSULE    Take 1 capsule by mouth once daily.    CALCIUM CARBONATE (CALCIUM 500 ORAL)    Take by mouth.    EPINEPHRINE (EPIPEN) 0.3 MG/0.3 ML ATIN    Inject 0.3 mLs (0.3 mg total) into the muscle once. for 1 dose    ERGOCALCIFEROL, VITAMIN D2, (VITAMIN D ORAL)    Take by mouth.    MULTIVITAMIN (THERAGRAN) PER TABLET    Take 1 tablet by mouth once daily.    TURMERIC 400 MG CAP    Take by mouth.   Discontinued Medications    NAPROXEN SODIUM (ANAPROX) 220 MG TABLET    Take 220 mg by mouth every 12 (twelve) hours.

## 2025-02-17 ENCOUNTER — PATIENT MESSAGE (OUTPATIENT)
Dept: ADMINISTRATIVE | Facility: HOSPITAL | Age: 60
End: 2025-02-17
Payer: COMMERCIAL

## 2025-04-07 ENCOUNTER — OFFICE VISIT (OUTPATIENT)
Dept: OBSTETRICS AND GYNECOLOGY | Facility: CLINIC | Age: 60
End: 2025-04-07
Payer: OTHER GOVERNMENT

## 2025-04-07 VITALS
SYSTOLIC BLOOD PRESSURE: 130 MMHG | HEIGHT: 68 IN | DIASTOLIC BLOOD PRESSURE: 78 MMHG | BODY MASS INDEX: 24.32 KG/M2 | WEIGHT: 160.5 LBS

## 2025-04-07 DIAGNOSIS — N95.0 PMB (POSTMENOPAUSAL BLEEDING): Primary | ICD-10-CM

## 2025-04-07 DIAGNOSIS — Z12.4 CERVICAL CANCER SCREENING: ICD-10-CM

## 2025-04-07 PROCEDURE — 99999 PR PBB SHADOW E&M-EST. PATIENT-LVL III: CPT | Mod: PBBFAC,,, | Performed by: OBSTETRICS & GYNECOLOGY

## 2025-04-07 PROCEDURE — 99213 OFFICE O/P EST LOW 20 MIN: CPT | Mod: PBBFAC,PN | Performed by: OBSTETRICS & GYNECOLOGY

## 2025-04-07 PROCEDURE — 99203 OFFICE O/P NEW LOW 30 MIN: CPT | Mod: S$PBB,,, | Performed by: OBSTETRICS & GYNECOLOGY

## 2025-04-07 NOTE — PROGRESS NOTES
"Chief Complaint   Patient presents with    Vaginal Bleeding       History of Present Illness   59 y.o. F patient presents today for PMB.     Did have vaginal bleeding 5 days ago for 3 days pinkish mucous for 3 days, then just mucous for1, and then spotting pinkish water. Complains of swollen LN in right groin    Risk Factors:   Unexposed estrogen: none  Obesity: BMI 24  Chronic anovulation: no  Diabetes: no  Hypertension: no  Postmenopausal:yes  Tamoxifen: previously, 5 months only, and then stopped years ago,   Low Parity: 4  Early menarche: 11  Late menopause (>52): 43 due to chemo, History of breast cancer, mother BRCA negative      Denies history of abnromal paps  Last pap 2019  Admits to hemorrhoids, denies constipation      Past medical and surgical history reviewed.   I have reviewed the patient's medical history in detail and updated the computerized patient record.  Review of patient's allergies indicates:   Allergen Reactions    Gabapentin Other (See Comments)     dizzy    Tamoxifen analogues Other (See Comments)     Unclear thinking, shoulder pain    Adhesive Rash     Rash on contact       Review of Systems  Constitutional: negative for chills and fatigue  Gastrointestinal: negative for abdominal pain, constipation, diarrhea, nausea and vomiting  Genitourinary:negative for  genital lesions and vaginal discharge, dysuria, frequency and hematuria    Physical Examination:  /78   Ht 5' 8" (1.727 m)   Wt 72.8 kg (160 lb 7.9 oz)   LMP 07/16/2011   BMI 24.40 kg/m²    Physical Exam:   Constitutional: She appears well-developed and well-nourished. No distress.             Abdominal: Soft. There is no abdominal tenderness.     Genitourinary:    Inguinal canal, urethra, bladder, vagina, uterus, right adnexa and left adnexa normal.   Rectum:      External hemorrhoid present.   The external female genitalia was normal.     Labial bartholins normal.Cervix is normal. Vaginal atrophy noted. Normal urethral " meatus.Urethra findings: no urethral mass, no tenderness and no urethral scarringBladder findings: no bladder distention and no bladder tenderness                    Assessment/Plan:  PMB (postmenopausal bleeding)  -     US Pelvis Comp with Transvag NON-OB (xpd; Future; Expected date: 04/07/2025    Cervical cancer screening  -     Liquid-Based Pap Smear, Screening        PMB work up ordered. Uterine sampling indicated if EMS >4mm.

## 2025-04-09 ENCOUNTER — HOSPITAL ENCOUNTER (OUTPATIENT)
Dept: RADIOLOGY | Facility: HOSPITAL | Age: 60
Discharge: HOME | End: 2025-04-09
Payer: OTHER GOVERNMENT

## 2025-04-09 ENCOUNTER — OFFICE VISIT (OUTPATIENT)
Dept: FAMILY MEDICINE | Facility: CLINIC | Age: 60
End: 2025-04-09
Payer: OTHER GOVERNMENT

## 2025-04-09 ENCOUNTER — RESULTS FOLLOW-UP (OUTPATIENT)
Dept: OBSTETRICS AND GYNECOLOGY | Facility: CLINIC | Age: 60
End: 2025-04-09

## 2025-04-09 ENCOUNTER — HOSPITAL ENCOUNTER (OUTPATIENT)
Dept: RADIOLOGY | Facility: HOSPITAL | Age: 60
Discharge: HOME | End: 2025-04-09
Attending: OBSTETRICS & GYNECOLOGY
Payer: OTHER GOVERNMENT

## 2025-04-09 ENCOUNTER — RESULTS FOLLOW-UP (OUTPATIENT)
Dept: FAMILY MEDICINE | Facility: CLINIC | Age: 60
End: 2025-04-09

## 2025-04-09 VITALS
HEIGHT: 68 IN | HEART RATE: 65 BPM | WEIGHT: 160.69 LBS | DIASTOLIC BLOOD PRESSURE: 84 MMHG | OXYGEN SATURATION: 98 % | BODY MASS INDEX: 24.35 KG/M2 | SYSTOLIC BLOOD PRESSURE: 132 MMHG

## 2025-04-09 DIAGNOSIS — R60.0 LOCALIZED EDEMA: Primary | ICD-10-CM

## 2025-04-09 DIAGNOSIS — N95.0 PMB (POSTMENOPAUSAL BLEEDING): ICD-10-CM

## 2025-04-09 DIAGNOSIS — M25.561 PAIN AND SWELLING OF RIGHT KNEE: ICD-10-CM

## 2025-04-09 DIAGNOSIS — M25.461 PAIN AND SWELLING OF RIGHT KNEE: Primary | ICD-10-CM

## 2025-04-09 DIAGNOSIS — R60.0 LOCALIZED EDEMA: ICD-10-CM

## 2025-04-09 DIAGNOSIS — M25.461 PAIN AND SWELLING OF RIGHT KNEE: ICD-10-CM

## 2025-04-09 DIAGNOSIS — M25.561 PAIN AND SWELLING OF RIGHT KNEE: Primary | ICD-10-CM

## 2025-04-09 PROCEDURE — 99214 OFFICE O/P EST MOD 30 MIN: CPT | Mod: PBBFAC,25,PN

## 2025-04-09 PROCEDURE — 76830 TRANSVAGINAL US NON-OB: CPT | Mod: 26,,, | Performed by: RADIOLOGY

## 2025-04-09 PROCEDURE — 76830 TRANSVAGINAL US NON-OB: CPT | Mod: TC,PO

## 2025-04-09 PROCEDURE — 76856 US EXAM PELVIC COMPLETE: CPT | Mod: 26,,, | Performed by: RADIOLOGY

## 2025-04-09 PROCEDURE — 93971 EXTREMITY STUDY: CPT | Mod: 26,RT,, | Performed by: RADIOLOGY

## 2025-04-09 PROCEDURE — 73562 X-RAY EXAM OF KNEE 3: CPT | Mod: 26,RT,, | Performed by: RADIOLOGY

## 2025-04-09 PROCEDURE — 99999 PR PBB SHADOW E&M-EST. PATIENT-LVL IV: CPT | Mod: PBBFAC,,,

## 2025-04-09 PROCEDURE — 73560 X-RAY EXAM OF KNEE 1 OR 2: CPT | Mod: 26,59,LT, | Performed by: RADIOLOGY

## 2025-04-09 PROCEDURE — 73562 X-RAY EXAM OF KNEE 3: CPT | Mod: TC,FY,PO,RT

## 2025-04-09 PROCEDURE — 93971 EXTREMITY STUDY: CPT | Mod: TC,PO,RT

## 2025-04-09 PROCEDURE — 99213 OFFICE O/P EST LOW 20 MIN: CPT | Mod: S$PBB,,,

## 2025-04-09 NOTE — PROGRESS NOTES
[facility]  [address] [place] [address]    Subjective    Chief Complaint:   Chief Complaint   Patient presents with    Cyst     R knee swelling behind knee. Pain spreading from  thigh down to her calf.  Approx.  3-4 weeks.       History of Present Illness:     Sonido Loera is a(n) 59 y.o. female with past medical history as noted below who presents to the clinic today for knee pain and swelling.     She reports swelling of the right knee x 3 weeks. This initially was localized to a spot behind the right knee, but has spread to the calf and thigh. Most of her discomfort is in the right calf. No fever, chest pain, or shortness of breath. She reports a history of knee arthritis.       Problem List: Problem List[1]    Current Outpatient Medications:   Current Outpatient Medications   Medication Instructions    ascorbic acid (vitamin C) (VITAMIN C) 500 mg, Daily    b complex vitamins capsule 1 capsule, Daily    calcium carbonate (CALCIUM 500 ORAL) Take by mouth.    EPINEPHrine (EPIPEN) 0.3 mg, Intramuscular, Once    ergocalciferol, vitamin D2, (VITAMIN D ORAL) Take by mouth.    multivitamin (THERAGRAN) per tablet 1 tablet, Daily    turmeric 400 mg Cap Take by mouth.       Surgical History:   Past Surgical History:   Procedure Laterality Date    BREAST LUMPECTOMY Left     with radiation and chemotherapy     SECTION      1    CHOLECYSTECTOMY      COLONOSCOPY  2015    Dr. Davenport, sent for scanning: one colon polyps removed, tortuous colon, large internal hemorrhoids; biopsy: tubular adenoma, repeat in 5 years for surveillance    COLONOSCOPY  2023    repeat 5 yrs - polyps    ESOPHAGOGASTRODUODENOSCOPY N/A 2019    Procedure: EGD (ESOPHAGOGASTRODUODENOSCOPY);  Surgeon: Michael Hebert Jr., MD;  Location: [facility];  Service: Endoscopy;  Laterality: N/A;    HERNIA REPAIR      REPAIR OF COLLATERAL LIGAMENT OF THUMB Bilateral        Yordan    ROBOT-ASSISTED LAPAROSCOPIC REPAIR OF INCISIONAL HERNIA N/A 10/07/2019    Procedure: ROBOTIC REPAIR, HERNIA, INCISIONAL;  Surgeon: Alexis Gonzalez MD;  Location: [facility];  Service: General;  Laterality: N/A;    VEIN SURGERY Right     Varicose vein sclerotherapy       Family History:   Family History   Problem Relation Name Age of Onset    Cirrhosis Maternal Grandmother          Non etoh    Heart disease Father Arnold     Breast cancer Mother Shakira 82    Arthritis Mother Shakira     Cancer Mother Shakira     No Known Problems Brother      Chronic infections Sister      Osteoporosis Sister      AVM Sister      Rhinitis Daughter      Breast cancer Maternal Aunt  78        mother's twin    Liver cancer Paternal Aunt      Urticaria Neg Hx      Immunodeficiency Neg Hx      Eczema Neg Hx      Asthma Neg Hx      Angioedema Neg Hx      Colon cancer Neg Hx      Crohn's disease Neg Hx      Ulcerative colitis Neg Hx      Esophageal cancer Neg Hx      Cataracts Neg Hx      Glaucoma Neg Hx      Macular degeneration Neg Hx      Ovarian cancer Neg Hx         Allergies:   Review of patient's allergies indicates:   Allergen Reactions    Gabapentin Other (See Comments)     dizzy    Tamoxifen analogues Other (See Comments)     Unclear thinking, shoulder pain    Adhesive Rash     Rash on contact       Tobacco Status:   Tobacco Use: Medium Risk (4/9/2025)    Patient History     Smoking Tobacco Use: Former     Smokeless Tobacco Use: Never     Passive Exposure: Past       Sexual Activity:   Social History     Substance and Sexual Activity   Sexual Activity Yes    Partners: Male    Birth control/protection: Post-menopausal, None       Alcohol Use:   Social History     Substance and Sexual Activity   Alcohol Use Yes    Alcohol/week: 1.0 standard drink of alcohol    Types: 1 Glasses of wine per week    Comment: 1-2 times a year         Objective       Vitals:    04/09/25 1311   BP: 132/84   Patient Position: Sitting   Pulse: 65   SpO2:  "98%   Weight: 72.9 kg (160 lb 11.5 oz)   Height: 5' 8" (1.727 m)       Review of Systems   Constitutional:  Negative for chills and fever.   Respiratory:  Negative for cough and shortness of breath.    Cardiovascular:  Positive for leg swelling. Negative for chest pain.   Musculoskeletal:  Positive for joint pain.       Physical Exam  Constitutional:       General: She is not in acute distress.     Appearance: Normal appearance.   HENT:      Head: Normocephalic and atraumatic.   Cardiovascular:      Rate and Rhythm: Normal rate and regular rhythm.      Heart sounds: Normal heart sounds. No murmur heard.  Pulmonary:      Effort: Pulmonary effort is normal. No respiratory distress.      Breath sounds: Normal breath sounds. No wheezing.   Musculoskeletal:      Right knee: No effusion or erythema. Tenderness (popliteal fossa) present over the medial joint line.      Left knee: No effusion or erythema.      Comments: +right calf tenderness   Skin:     General: Skin is warm.   Neurological:      Mental Status: She is alert and oriented to person, place, and time.   Psychiatric:         Behavior: Behavior normal.           Assessment and Plan:    1. Localized edema  -     US Lower Extremity Veins Right; Future; Expected date: 04/09/2025        Visit summary:    Sonido Loera presented today for knee swelling and calf pain.    Patient will have US this afternoon to r/o acute DVT    If negative, consider x-ray and possible intraarticular steroid injection      Patient was instructed to report to ER if symptoms become severe.    Follow up: pending results      Shirley Laird PA-C    This note was created partially with voice dictation software and is prone to errors. This note has been reviewed by me but some errors are inevitable.          [1]   Patient Active Problem List  Diagnosis    Allergic rhinitis due to pollen    Fibromyalgia    Hernia of abdominal wall    Liver cyst    RUQ abdominal pain    Umbilical hernia    " Gastroesophageal reflux disease without esophagitis    Thyroid nodule    Mixed hyperlipidemia    Cervical myofascial pain syndrome    Bilateral occipital neuralgia    Erosive osteoarthritis    Vitamin D deficient osteomalacia    Pulmonary nodule    S/P lumpectomy, left breast (2009)    S/P radiotherapy    S/P chemotherapy, time since greater than 12 weeks    ER+ (estrogen receptor positive status)    Malignant neoplasm of left breast in female, estrogen receptor positive (2009)    Multiple neurological symptoms    Osteoporosis

## 2025-04-11 ENCOUNTER — RESULTS FOLLOW-UP (OUTPATIENT)
Dept: FAMILY MEDICINE | Facility: CLINIC | Age: 60
End: 2025-04-11
Payer: OTHER GOVERNMENT

## 2025-04-11 ENCOUNTER — TELEPHONE (OUTPATIENT)
Dept: FAMILY MEDICINE | Facility: CLINIC | Age: 60
End: 2025-04-11
Payer: OTHER GOVERNMENT

## 2025-04-11 DIAGNOSIS — M25.461 PAIN AND SWELLING OF RIGHT KNEE: Primary | ICD-10-CM

## 2025-04-11 DIAGNOSIS — R60.0 LOCALIZED EDEMA: ICD-10-CM

## 2025-04-11 DIAGNOSIS — M25.561 PAIN AND SWELLING OF RIGHT KNEE: Primary | ICD-10-CM

## 2025-04-24 ENCOUNTER — OFFICE VISIT (OUTPATIENT)
Dept: ORTHOPEDICS | Facility: CLINIC | Age: 60
End: 2025-04-24
Payer: OTHER GOVERNMENT

## 2025-04-24 VITALS
HEIGHT: 68 IN | WEIGHT: 160.69 LBS | HEART RATE: 65 BPM | BODY MASS INDEX: 24.35 KG/M2 | SYSTOLIC BLOOD PRESSURE: 132 MMHG | DIASTOLIC BLOOD PRESSURE: 84 MMHG

## 2025-04-24 DIAGNOSIS — M94.261 CHONDROMALACIA OF KNEE, RIGHT: ICD-10-CM

## 2025-04-24 DIAGNOSIS — M17.11 PRIMARY OSTEOARTHRITIS OF RIGHT KNEE: Primary | ICD-10-CM

## 2025-04-24 DIAGNOSIS — R60.0 LOCALIZED EDEMA: ICD-10-CM

## 2025-04-24 DIAGNOSIS — M25.561 PAIN AND SWELLING OF RIGHT KNEE: ICD-10-CM

## 2025-04-24 DIAGNOSIS — M25.461 PAIN AND SWELLING OF RIGHT KNEE: ICD-10-CM

## 2025-04-24 PROCEDURE — 99999PBSHW PR PBB SHADOW TECHNICAL ONLY FILED TO HB: Mod: PBBFAC,,,

## 2025-04-24 PROCEDURE — 20610 DRAIN/INJ JOINT/BURSA W/O US: CPT | Mod: PBBFAC,PO,RT | Performed by: NURSE PRACTITIONER

## 2025-04-24 PROCEDURE — 99999 PR PBB SHADOW E&M-EST. PATIENT-LVL IV: CPT | Mod: PBBFAC,,, | Performed by: NURSE PRACTITIONER

## 2025-04-24 PROCEDURE — 99214 OFFICE O/P EST MOD 30 MIN: CPT | Mod: PBBFAC,PO | Performed by: NURSE PRACTITIONER

## 2025-04-24 RX ORDER — TRIAMCINOLONE ACETONIDE 40 MG/ML
40 INJECTION, SUSPENSION INTRA-ARTICULAR; INTRAMUSCULAR
Status: DISCONTINUED | OUTPATIENT
Start: 2025-04-24 | End: 2025-04-24 | Stop reason: HOSPADM

## 2025-04-24 RX ADMIN — TRIAMCINOLONE ACETONIDE 40 MG: 40 INJECTION, SUSPENSION INTRA-ARTICULAR; INTRAMUSCULAR at 03:04

## 2025-04-24 NOTE — PROCEDURES
Large Joint Aspiration/Injection: R knee    Date/Time: 4/24/2025 3:00 PM    Performed by: Leigha Kendall FNP  Authorized by: Leigha Kendall FNP    Consent Done?:  Yes (Verbal)  Indications:  Pain  Timeout: prior to procedure the correct patient, procedure, and site was verified    Prep: patient was prepped and draped in usual sterile fashion    Local anesthetic:  Lidocaine 1% without epinephrine  Anesthetic total (ml):  5      Details:  Needle Size:  21 G  Approach:  Anterolateral  Location:  Knee  Site:  R knee  Medications:  40 mg triamcinolone acetonide 40 mg/mL  Patient tolerance:  Patient tolerated the procedure well with no immediate complications

## 2025-04-24 NOTE — PROGRESS NOTES
Chief Complaint   Patient presents with    Right Knee - Pain     History of Present Illness    Sonido presents with right knee pain and swelling ongoing for approximately 6 weeks. Her knee was initially swollen in the back, but now the entire knee is affected. Pain started after kneeling down. She has difficulty bending or fully straightening the knee, with pain during these movements. She reports right knee instability, with the patella feeling unstable. She has limited range of motion in the knee.    She, who describes herself as very active, has to limp when trying to keep up with her grandchildren. She has experienced similar swelling in the past when kneeling, but it usually resolved within 2-3 weeks and was not as severe as the current episode. For years, she has tried to avoid kneeling due to these incidents.    She expresses frustration that the swelling is not resolving.    She denies any formal medical diagnoses.      ROS:  Musculoskeletal: +joint pain, +joint swelling, +limb pain, +pain with movement         Past Medical History:   Diagnosis Date    Allergic rhinitis due to pollen     Breast cancer 2009    left lumpectomy invasive ductal (dx [place]) tamo    Closed nondisplaced fracture of fifth metatarsal bone of left foot with routine healing     2021// hx of right same area fracture 1999 (boot for both)    Colon polyp     Encounter for blood transfusion     after miscarriage     ER+ (estrogen receptor positive status) 04/25/2022    Erosive osteoarthritis 05/07/2020    Fibromyalgia     Headache     Hernia of abdominal wall     Liver cyst     Malignant neoplasm of left breast in female, estrogen receptor positive 04/25/2022    S/P chemotherapy, time since greater than 12 weeks 04/25/2022    S/P lumpectomy, left breast (2009) 04/25/2022    S/P radiotherapy 04/25/2022    Thyroid nodule     left nodule 2021 stable    Umbilical hernia       Past Surgical History:   Procedure Laterality Date    BREAST  LUMPECTOMY Left     with radiation and chemotherapy     SECTION      1    CHOLECYSTECTOMY      COLONOSCOPY  2015    Dr. Davenport, sent for scanning: one colon polyps removed, tortuous colon, large internal hemorrhoids; biopsy: tubular adenoma, repeat in 5 years for surveillance    COLONOSCOPY  2023    repeat 5 yrs - polyps    ESOPHAGOGASTRODUODENOSCOPY N/A 2019    Procedure: EGD (ESOPHAGOGASTRODUODENOSCOPY);  Surgeon: Michael Hebert Jr., MD;  Location: [facility];  Service: Endoscopy;  Laterality: N/A;    HERNIA REPAIR      REPAIR OF COLLATERAL LIGAMENT OF THUMB Bilateral      Dr Farr    ROBOT-ASSISTED LAPAROSCOPIC REPAIR OF INCISIONAL HERNIA N/A 10/07/2019    Procedure: ROBOTIC REPAIR, HERNIA, INCISIONAL;  Surgeon: Alexis Gonzalez MD;  Location: [facility];  Service: General;  Laterality: N/A;    VEIN SURGERY Right     Varicose vein sclerotherapy      Medications Ordered Prior to Encounter[1]   Review of patient's allergies indicates:   Allergen Reactions    Gabapentin Other (See Comments)     dizzy    Tamoxifen analogues Other (See Comments)     Unclear thinking, shoulder pain    Adhesive Rash     Rash on contact      Family History not pertinent   Social History[2]      Review of Systems:   Constitutional:  Denies fever or chills    Eyes:  Denies change in visual acuity    HENT:  Denies nasal congestion or sore throat    Respiratory:  Denies cough or shortness of breath    Cardiovascular:  Denies chest pain or edema    GI:  Denies abdominal pain, nausea, vomiting, bloody stools or diarrhea    :  Denies dysuria    Integument:  Denies rash    Neurologic:  Denies headache, focal weakness or sensory changes    Endocrine:  Denies polyuria or polydipsia    Lymphatic:  Denies swollen glands    Psychiatric:  Denies depression or anxiety       Physical Exam:    Constitutional:  Well developed, well nourished, no acute distress, non-toxic appearance    Integument:  Well  hydrated  Neurologic:  Alert & oriented x 3  Psychiatric:  Speech and behavior appropriate      Bilateral Knee Exam    Right Knee Exam   Tenderness   The patient is experiencing tenderness in the medial joint line.    Range of Motion   Flexion: abnormal     Muscle Strength   The patient has normal knee strength.    Tests   Abdifatah:  Medial - positive   Lachman:  Anterior - negative      Varus: negative  Valgus: negative  Patellar Apprehension: negative      Other   Erythema: absent  Sensation: normal  Pulse: present  Swelling: mild    left Knee exam   Knee exam performed same as contralateral side and is normal            X-rays were performed, personally reviewed by me and findings discussed with the patient.   3 views of the right knee show mild medial compartment narrowing is noted bilaterally (worse to right knee).  No obvious fracture or dislocation is noted. A suprapatellar joint effusion that is moderate on the right is noted.  Quadriceps spur off of the right patella is noted. Lateral patellar tilt is noted right greater than left.               Primary osteoarthritis of right knee  -     Prior authorization Order  -     Large Joint Aspiration/Injection: R knee  -     triamcinolone acetonide injection 40 mg    Pain and swelling of right knee  -     Ambulatory referral/consult to Orthopedics    Localized edema  -     Ambulatory referral/consult to Orthopedics    Chondromalacia of knee, right  -     Prior authorization Order      Ice, compress with ace wrap and elevate as needed for pain and swelling.   We discussed gel options (Euflexxa).     Using an aseptic technique, I injected 5 cc of lidocaine 1% without and 1 cc of kenalog 40mg into the right Knee. The patient tolerated this well. I will have them return to clinic in 2 months for right knee euflexxa gel series.                [1]   Current Outpatient Medications on File Prior to Visit   Medication Sig Dispense Refill    ascorbic acid (VITAMIN C) 500 MG  tablet Take 500 mg by mouth once daily.      b complex vitamins capsule Take 1 capsule by mouth once daily.      calcium carbonate (CALCIUM 500 ORAL) Take by mouth.      ergocalciferol, vitamin D2, (VITAMIN D ORAL) Take by mouth.      multivitamin (THERAGRAN) per tablet Take 1 tablet by mouth once daily.      turmeric 400 mg Cap Take by mouth.      EPINEPHrine (EPIPEN) 0.3 mg/0.3 mL AtIn Inject 0.3 mLs (0.3 mg total) into the muscle once. for 1 dose 2 each 0     No current facility-administered medications on file prior to visit.   [2]   Social History  Socioeconomic History    Marital status:    Tobacco Use    Smoking status: Former     Current packs/day: 0.00     Average packs/day: 1 pack/day for 3.0 years (3.0 ttl pk-yrs)     Types: Cigarettes     Start date: 1980     Quit date: 1983     Years since quittin.2     Passive exposure: Past    Smokeless tobacco: Never    Tobacco comments:     Very brief part of late teens   Substance and Sexual Activity    Alcohol use: Yes     Alcohol/week: 1.0 standard drink of alcohol     Types: 1 Glasses of wine per week     Comment: 1-2 times a year    Drug use: Never    Sexual activity: Yes     Partners: Male     Birth control/protection: Post-menopausal, None     Social Drivers of Health     Financial Resource Strain: Low Risk  (2024)    Overall Financial Resource Strain (CARDIA)     Difficulty of Paying Living Expenses: Not hard at all   Food Insecurity: No Food Insecurity (2024)    Hunger Vital Sign     Worried About Running Out of Food in the Last Year: Never true     Ran Out of Food in the Last Year: Never true   Transportation Needs: No Transportation Needs (2024)    PRAPARE - Transportation     Lack of Transportation (Medical): No     Lack of Transportation (Non-Medical): No   Physical Activity: Sufficiently Active (2024)    Exercise Vital Sign     Days of Exercise per Week: 6 days     Minutes of Exercise per Session: 50 min    Stress: No Stress Concern Present (4/16/2024)    [employer] [place] [employer] - Occupational Stress Questionnaire     Feeling of Stress : Not at all   Housing Stability: Unknown (4/16/2024)    Housing Stability Vital Sign     Unable to Pay for Housing in the Last Year: No

## 2025-06-24 ENCOUNTER — OFFICE VISIT (OUTPATIENT)
Dept: ORTHOPEDICS | Facility: CLINIC | Age: 60
End: 2025-06-24
Payer: OTHER GOVERNMENT

## 2025-06-24 VITALS
BODY MASS INDEX: 24.35 KG/M2 | DIASTOLIC BLOOD PRESSURE: 84 MMHG | HEIGHT: 68 IN | WEIGHT: 160.69 LBS | SYSTOLIC BLOOD PRESSURE: 132 MMHG | HEART RATE: 65 BPM

## 2025-06-24 DIAGNOSIS — M17.11 PRIMARY OSTEOARTHRITIS OF RIGHT KNEE: Primary | ICD-10-CM

## 2025-06-24 PROCEDURE — 20610 DRAIN/INJ JOINT/BURSA W/O US: CPT | Mod: PBBFAC,PO,RT | Performed by: NURSE PRACTITIONER

## 2025-06-24 PROCEDURE — 99999PBSHW PR PBB SHADOW TECHNICAL ONLY FILED TO HB: Mod: JZ,PBBFAC,,

## 2025-06-24 PROCEDURE — 99213 OFFICE O/P EST LOW 20 MIN: CPT | Mod: PBBFAC,PO | Performed by: NURSE PRACTITIONER

## 2025-06-24 PROCEDURE — 20610 DRAIN/INJ JOINT/BURSA W/O US: CPT | Mod: S$PBB,RT,, | Performed by: NURSE PRACTITIONER

## 2025-06-24 PROCEDURE — 99499 UNLISTED E&M SERVICE: CPT | Mod: S$PBB,,, | Performed by: NURSE PRACTITIONER

## 2025-06-24 PROCEDURE — 99999 PR PBB SHADOW E&M-EST. PATIENT-LVL III: CPT | Mod: PBBFAC,,, | Performed by: NURSE PRACTITIONER

## 2025-06-24 RX ADMIN — Medication 20 MG: at 10:06

## 2025-07-01 ENCOUNTER — OFFICE VISIT (OUTPATIENT)
Dept: ORTHOPEDICS | Facility: CLINIC | Age: 60
End: 2025-07-01
Payer: OTHER GOVERNMENT

## 2025-07-01 VITALS
DIASTOLIC BLOOD PRESSURE: 84 MMHG | SYSTOLIC BLOOD PRESSURE: 132 MMHG | BODY MASS INDEX: 24.35 KG/M2 | HEART RATE: 65 BPM | WEIGHT: 160.69 LBS | HEIGHT: 68 IN

## 2025-07-01 DIAGNOSIS — M17.11 PRIMARY OSTEOARTHRITIS OF RIGHT KNEE: Primary | ICD-10-CM

## 2025-07-01 PROCEDURE — 20610 DRAIN/INJ JOINT/BURSA W/O US: CPT | Mod: PBBFAC,PO,RT | Performed by: NURSE PRACTITIONER

## 2025-07-01 PROCEDURE — 99999PBSHW PR PBB SHADOW TECHNICAL ONLY FILED TO HB: Mod: JZ,PBBFAC,,

## 2025-07-01 PROCEDURE — 20610 DRAIN/INJ JOINT/BURSA W/O US: CPT | Mod: S$PBB,RT,, | Performed by: NURSE PRACTITIONER

## 2025-07-01 PROCEDURE — 99999 PR PBB SHADOW E&M-EST. PATIENT-LVL III: CPT | Mod: PBBFAC,,, | Performed by: NURSE PRACTITIONER

## 2025-07-01 PROCEDURE — 99499 UNLISTED E&M SERVICE: CPT | Mod: S$PBB,,, | Performed by: NURSE PRACTITIONER

## 2025-07-01 PROCEDURE — 99213 OFFICE O/P EST LOW 20 MIN: CPT | Mod: PBBFAC,PO,25 | Performed by: NURSE PRACTITIONER

## 2025-07-01 RX ADMIN — Medication 20 MG: at 11:07

## 2025-07-01 NOTE — PROGRESS NOTES
Chief Complaint   Patient presents with    Right Knee - Pain       HPI:  59 y.o. returns to clinic today for the 2nd Euflexxa injection    Knee exam  No interval change    Primary osteoarthritis of right knee  -     Large Joint Aspiration/Injection: R knee  -     sodium hyaluronate (EUFLEXXA) 10 mg/mL(mw 2.4 -3.6 million) injection 20 mg          Using an aseptic technique, I injected Euflexxa into the right Knee. The patient tolerated this well. I will have them return to clinic next week as scheduled.

## 2025-07-01 NOTE — PROCEDURES
Large Joint Aspiration/Injection: R knee    Date/Time: 7/1/2025 11:00 AM    Performed by: Leigha Kendall FNP  Authorized by: Leigha Kendall FNP    Consent Done?:  Yes (Verbal)  Indications:  Pain  Timeout: prior to procedure the correct patient, procedure, and site was verified    Prep: patient was prepped and draped in usual sterile fashion      Details:  Needle Size:  21 G  Approach:  Anterolateral  Location:  Knee  Site:  R knee  Medications:  20 mg sodium hyaluronate (EUFLEXXA) 10 mg/mL(mw 2.4 -3.6 million)  Patient tolerance:  Patient tolerated the procedure well with no immediate complications

## 2025-07-02 NOTE — PROGRESS NOTES
Chief Complaint   Patient presents with    Right Knee - Pain       HPI:  59 y.o. returns to clinic today for the 1st Euflexxa injection    Knee exam  No interval change    Primary osteoarthritis of right knee  -     Large Joint Aspiration/Injection: R knee  -     sodium hyaluronate (EUFLEXXA) 10 mg/mL(mw 2.4 -3.6 million) injection 20 mg          Using an aseptic technique, I injected Euflexxa into the right knee. The patient tolerated this well. I will have them return to clinic next week as scheduled.

## 2025-07-02 NOTE — PROCEDURES
Large Joint Aspiration/Injection: R knee    Date/Time: 6/24/2025 10:40 AM    Performed by: Leigha Kendall FNP  Authorized by: Leigha Kendall FNP    Consent Done?:  Yes (Verbal)  Indications:  Pain  Timeout: prior to procedure the correct patient, procedure, and site was verified    Prep: patient was prepped and draped in usual sterile fashion      Details:  Needle Size:  21 G  Approach:  Anterolateral  Location:  Knee  Site:  R knee  Medications:  20 mg sodium hyaluronate (EUFLEXXA) 10 mg/mL(mw 2.4 -3.6 million)  Patient tolerance:  Patient tolerated the procedure well with no immediate complications

## 2025-07-08 ENCOUNTER — OFFICE VISIT (OUTPATIENT)
Dept: ORTHOPEDICS | Facility: CLINIC | Age: 60
End: 2025-07-08
Payer: OTHER GOVERNMENT

## 2025-07-08 VITALS
DIASTOLIC BLOOD PRESSURE: 84 MMHG | SYSTOLIC BLOOD PRESSURE: 132 MMHG | WEIGHT: 160.69 LBS | HEIGHT: 68 IN | HEART RATE: 65 BPM | BODY MASS INDEX: 24.35 KG/M2

## 2025-07-08 DIAGNOSIS — M17.11 PRIMARY OSTEOARTHRITIS OF RIGHT KNEE: Primary | ICD-10-CM

## 2025-07-08 PROCEDURE — 99499 UNLISTED E&M SERVICE: CPT | Mod: S$PBB,,, | Performed by: NURSE PRACTITIONER

## 2025-07-08 PROCEDURE — 99999 PR PBB SHADOW E&M-EST. PATIENT-LVL III: CPT | Mod: PBBFAC,,, | Performed by: NURSE PRACTITIONER

## 2025-07-08 PROCEDURE — 99213 OFFICE O/P EST LOW 20 MIN: CPT | Mod: PBBFAC,PO | Performed by: NURSE PRACTITIONER

## 2025-07-08 PROCEDURE — 20610 DRAIN/INJ JOINT/BURSA W/O US: CPT | Mod: S$PBB,RT,, | Performed by: NURSE PRACTITIONER

## 2025-07-08 PROCEDURE — 99999PBSHW PR PBB SHADOW TECHNICAL ONLY FILED TO HB: Mod: JZ,PBBFAC,,

## 2025-07-08 PROCEDURE — 20610 DRAIN/INJ JOINT/BURSA W/O US: CPT | Mod: PBBFAC,PO,RT | Performed by: NURSE PRACTITIONER

## 2025-07-08 RX ADMIN — Medication 20 MG: at 11:07

## 2025-07-08 NOTE — PROCEDURES
Large Joint Aspiration/Injection: R knee    Date/Time: 7/8/2025 11:00 AM    Performed by: Leigha Kendall FNP  Authorized by: Leigha Kendall FNP    Consent Done?:  Yes (Verbal)  Indications:  Pain  Timeout: prior to procedure the correct patient, procedure, and site was verified    Prep: patient was prepped and draped in usual sterile fashion      Details:  Needle Size:  21 G  Approach:  Anterolateral  Location:  Knee  Site:  R knee  Medications:  20 mg sodium hyaluronate (EUFLEXXA) 10 mg/mL(mw 2.4 -3.6 million)  Patient tolerance:  Patient tolerated the procedure well with no immediate complications

## 2025-07-08 NOTE — PROGRESS NOTES
Chief Complaint   Patient presents with    Right Knee - Pain       HPI:  59 y.o. returns to clinic today for the 3rd Euflexxa injection    Knee exam  No interval change    Primary osteoarthritis of right knee  -     Large Joint Aspiration/Injection: R knee  -     sodium hyaluronate (EUFLEXXA) 10 mg/mL(mw 2.4 -3.6 million) injection 20 mg          Using an aseptic technique, I injected Euflexxa into the right knee. The patient tolerated this well. I will have them return to clinic as needed.

## (undated) DEVICE — ELECTRODE REM PLYHSV RETURN 9

## (undated) DEVICE — SUT 0 VICRYL / UR6 (J603)

## (undated) DEVICE — NDL INSUF ULTRA VERESS 120MM

## (undated) DEVICE — STRAP OR TABLE 5IN X 72IN

## (undated) DEVICE — PACK BASIC

## (undated) DEVICE — KIT WING PAD POSITIONING

## (undated) DEVICE — SEE MEDLINE ITEM 157117

## (undated) DEVICE — APPLICATOR CHLORAPREP ORN 26ML

## (undated) DEVICE — SOL CLEARIFY VISUALIZATION LAP

## (undated) DEVICE — SOL ELECTROLUBE ANTI-STIC

## (undated) DEVICE — OBTURATOR 8MM BLADELESS

## (undated) DEVICE — SUT MONOCRYL 4-0 PS-2

## (undated) DEVICE — CLOSURE SKIN STERI STRIP 1/2X4

## (undated) DEVICE — SET TUBE PNEUMOCLEAR SE HI FLO

## (undated) DEVICE — BLADE SURG CARBON STEEL SZ11

## (undated) DEVICE — KIT ROBOTIC 3 ARM DA VINCI SI

## (undated) DEVICE — CORD BIPOLAR 12 FOOT

## (undated) DEVICE — SUT STRATAFIX PDO 2-0 SH

## (undated) DEVICE — SUT VLOC BLU GS-22 SZ0 18IN

## (undated) DEVICE — TROCAR ENDOPATH XCEL 12X100MM

## (undated) DEVICE — SLEEVE SCD EXPRESS CALF MEDIUM

## (undated) DEVICE — SEE MEDLINE ITEM 107746

## (undated) DEVICE — DRAPE ABDOMINAL TIBURON 14X11

## (undated) DEVICE — TROCAR ENDOPATH XCEL 5X100MM

## (undated) DEVICE — PACK CUSTOM UNIV BASIN SLI

## (undated) DEVICE — COVER TIP CURVED SCISSORS XI

## (undated) DEVICE — SEE MEDLINE ITEM 152622

## (undated) DEVICE — SOL WATER STRL IRR 1000ML

## (undated) DEVICE — SYR 10CC LUER LOCK

## (undated) DEVICE — GLOVE SURG ULTRA TOUCH 7.5